# Patient Record
Sex: FEMALE | Race: WHITE | Employment: OTHER | URBAN - METROPOLITAN AREA
[De-identification: names, ages, dates, MRNs, and addresses within clinical notes are randomized per-mention and may not be internally consistent; named-entity substitution may affect disease eponyms.]

---

## 2017-01-02 ENCOUNTER — PATIENT MESSAGE (OUTPATIENT)
Dept: FAMILY MEDICINE CLINIC | Facility: CLINIC | Age: 47
End: 2017-01-02

## 2017-01-02 DIAGNOSIS — B37.9 CANDIDA INFECTION: Primary | ICD-10-CM

## 2017-01-02 RX ORDER — FLUCONAZOLE 150 MG/1
150 TABLET ORAL ONCE
Qty: 1 TABLET | Refills: 0 | Status: SHIPPED | OUTPATIENT
Start: 2017-01-02 | End: 2017-01-02

## 2017-01-02 NOTE — TELEPHONE ENCOUNTER
From: Bhavya Prince  To: Chyna Bird MD  Sent: 1/2/2017 7:40 AM CST  Subject: Non-Urgent Medical Question    Hi Lona Dark.  I need another Fluconazole because the sore on the right inside upper lip is not healed and it still hurts a bit even w

## 2017-01-03 ENCOUNTER — PATIENT MESSAGE (OUTPATIENT)
Dept: FAMILY MEDICINE CLINIC | Facility: CLINIC | Age: 47
End: 2017-01-03

## 2017-01-03 DIAGNOSIS — K25.3 CAMERON LESION, ACUTE: Primary | ICD-10-CM

## 2017-01-03 DIAGNOSIS — S73.192D ACETABULAR LABRUM TEAR, LEFT, SUBSEQUENT ENCOUNTER: ICD-10-CM

## 2017-01-04 NOTE — TELEPHONE ENCOUNTER
Please see above message and advise if okay for referral.  If so for what diagnosis? Please advise. Thank you!

## 2017-01-04 NOTE — TELEPHONE ENCOUNTER
Dx: Juan Fix lesion, acute/Acetabular labrum tear, left, subsequent encounter    [facility] for referral.

## 2017-01-05 ENCOUNTER — PATIENT MESSAGE (OUTPATIENT)
Dept: FAMILY MEDICINE CLINIC | Facility: CLINIC | Age: 47
End: 2017-01-05

## 2017-01-06 NOTE — TELEPHONE ENCOUNTER
Results were faxed to Dr. Loo [facility] office as requested at [phone]. Fax confirmation was received.

## 2017-01-06 NOTE — TELEPHONE ENCOUNTER
From: Ken Espinoza  To: Monica Jones MD  Sent: 1/5/2017 3:43 PM CST  Subject: Other    Hi Dr Fletcher Oh. I\"am going to see Dr Zhong Getting on January 20th and a copy of the ex-ray of my hip and a copy of the  MRI need to be sent to him . Thank You.

## 2017-01-09 ENCOUNTER — PATIENT MESSAGE (OUTPATIENT)
Dept: FAMILY MEDICINE CLINIC | Facility: CLINIC | Age: 47
End: 2017-01-09

## 2017-01-09 DIAGNOSIS — B37.9 CANDIDA INFECTION: Primary | ICD-10-CM

## 2017-01-10 RX ORDER — FLUCONAZOLE 150 MG/1
150 TABLET ORAL ONCE
Qty: 1 TABLET | Refills: 0 | Status: CANCELLED | OUTPATIENT
Start: 2017-01-10 | End: 2017-01-10

## 2017-01-10 NOTE — TELEPHONE ENCOUNTER
From: Helena Wells  To: Colleen Vincent MD  Sent: 1/9/2017 1:43 PM CST  Subject: Other    Hi Dr Evans Lies. I have another canker sore starting inside my lower lip on the right side. Can I get another refill of the Fluconazole ? Luis Vasquez Thank You.

## 2017-01-10 NOTE — TELEPHONE ENCOUNTER
Diflucan will not treat a canker sore. ..it only treats yeast which would appear as white in the mouth. Does she have any white discharge?  Otherwise she can use the lidocaine or valtrex given

## 2017-01-16 ENCOUNTER — OFFICE VISIT (OUTPATIENT)
Dept: FAMILY MEDICINE CLINIC | Facility: CLINIC | Age: 47
End: 2017-01-16

## 2017-01-16 ENCOUNTER — PATIENT MESSAGE (OUTPATIENT)
Dept: FAMILY MEDICINE CLINIC | Facility: CLINIC | Age: 47
End: 2017-01-16

## 2017-01-16 ENCOUNTER — TELEPHONE (OUTPATIENT)
Dept: FAMILY MEDICINE CLINIC | Facility: CLINIC | Age: 47
End: 2017-01-16

## 2017-01-16 VITALS — SYSTOLIC BLOOD PRESSURE: 120 MMHG | HEART RATE: 76 BPM | DIASTOLIC BLOOD PRESSURE: 80 MMHG | RESPIRATION RATE: 16 BRPM

## 2017-01-16 DIAGNOSIS — K12.0 ORAL APHTHOUS ULCER: Primary | ICD-10-CM

## 2017-01-16 PROCEDURE — 99213 OFFICE O/P EST LOW 20 MIN: CPT | Performed by: FAMILY MEDICINE

## 2017-01-16 RX ORDER — ACYCLOVIR 50 MG/G
OINTMENT TOPICAL
Qty: 30 G | Refills: 0 | Status: SHIPPED | OUTPATIENT
Start: 2017-01-16 | End: 2017-05-04 | Stop reason: ALTCHOICE

## 2017-01-16 RX ORDER — ACYCLOVIR 400 MG/1
400 TABLET ORAL 3 TIMES DAILY
Qty: 30 TABLET | Refills: 0 | Status: SHIPPED | OUTPATIENT
Start: 2017-01-16 | End: 2017-04-10

## 2017-01-16 RX ORDER — TRAMADOL HYDROCHLORIDE 50 MG/1
50 TABLET ORAL EVERY 8 HOURS PRN
Qty: 20 TABLET | Refills: 0 | Status: SHIPPED | OUTPATIENT
Start: 2017-01-16 | End: 2017-04-10

## 2017-01-16 NOTE — TELEPHONE ENCOUNTER
Called patient, went over medication below. Informed the pt, the ointment called in is meant for external use only. Pt states understanding.  Informed pt, an oral medication went to the pharmacy to take TID that should work systemically to clear up the cank

## 2017-01-16 NOTE — TELEPHONE ENCOUNTER
From: Katina Nash  To: Scarlett Peralta MD  Sent: 1/16/2017 3:43 PM CST  Subject: Other    Hi Dr Jagdeep Ellsworth. Can Acyclovir 5% Ointment, USP be used inside my mouth?

## 2017-01-17 ENCOUNTER — PATIENT MESSAGE (OUTPATIENT)
Dept: FAMILY MEDICINE CLINIC | Facility: CLINIC | Age: 47
End: 2017-01-17

## 2017-01-17 NOTE — TELEPHONE ENCOUNTER
From: Cherylann Lombard  To: Mckayla Moody MD  Sent: 1/17/2017 11:48 AM CST  Subject: Non-Urgent Medical Question    Hi Dr Perea Offer. I\"am throwing up from the medication and not feeling well can i please have a doctors note for 2 more days. Thank You.

## 2017-01-17 NOTE — TELEPHONE ENCOUNTER
From: Yasmin Michael  To: Karen Potts MD  Sent: 1/17/2017 11:38 AM CST  Subject: Non-Urgent Medical Question    Hi Dr Adiel Whitfield. Can I please have another doctors note for 2 more days as kehinde not feeling very well. thank you.

## 2017-01-20 ENCOUNTER — OFFICE VISIT (OUTPATIENT)
Dept: ORTHOPEDICS CLINIC | Facility: CLINIC | Age: 47
End: 2017-01-20

## 2017-01-20 DIAGNOSIS — M25.552 ACUTE PAIN OF LEFT HIP: Primary | ICD-10-CM

## 2017-01-20 DIAGNOSIS — M25.852 HIP IMPINGEMENT SYNDROME, LEFT: ICD-10-CM

## 2017-01-20 PROCEDURE — 99212 OFFICE O/P EST SF 10 MIN: CPT | Performed by: ORTHOPAEDIC SURGERY

## 2017-01-20 PROCEDURE — 99243 OFF/OP CNSLTJ NEW/EST LOW 30: CPT | Performed by: ORTHOPAEDIC SURGERY

## 2017-01-20 RX ORDER — VALACYCLOVIR HYDROCHLORIDE 1 G/1
TABLET, FILM COATED ORAL
COMMUNITY
Start: 2017-01-11 | End: 2017-03-27

## 2017-01-20 NOTE — PROGRESS NOTES
1/20/2017  Katina Nash  12/15/1970  55year old   female  Stephanie Richard MD    HPI:   Patient presents with:  Consult: Left hip pain -- Xrays and MRI done in 10/2016. Onset 10/2016 and denies injury. Tried T#3 and did not help.  Hx of scleroderm daily. Disp:  Rfl:    Lidocaine Viscous 2 % Mouth/Throat Solution Gargle with 15ml Q3hrs prn pain Disp: 200 mL Rfl: 0      HISTORY:  Past Medical History   Diagnosis Date   • Scleroderma ([address]           Past Surgical History    HYSTERECTOMY      Comment AG left    The risks, indications, benefits, and procedures of both operative and non-operative treatment were discussed with the patient. This is a pleasant 55-year-old female that sustained a left hip pain.   The patient also has impingement syndrome of t

## 2017-01-22 NOTE — PROGRESS NOTES
Chief Complaint:   Patient presents with:  Canker Sores    HPI:   This is a 55year old female patient is presenting for follow-up after she completed oral antibiotics and lidocaine as needed for oral ulcers.   Patient has a history of questionable cold sor Disp:  Rfl:    methotrexate 2.5 MG Oral Tab  Disp:  Rfl:    Losartan Potassium 25 MG Oral Tab Take 1 tablet by mouth daily. Disp:  Rfl:    folic acid 1 MG Oral Tab Take 1 tablet by mouth daily.  Disp:  Rfl:       Counseling given: Not Answered       REVIEW Constitutional: She is oriented to person, place, and time. She appears well-developed and well-nourished. HENT:   Head: Normocephalic and atraumatic. Right Ear: Tympanic membrane and ear canal normal. Tympanic membrane is not erythematous.  No cerume AND PLAN:   Diagnoses and all orders for this visit:    Oral aphthous ulcer  -     acyclovir 5 % External Ointment; Apply to ulcer tid x 7 days  -     TraMADol HCl 50 MG Oral Tab;  Take 1 tablet (50 mg total) by mouth every 8 (eight) hours as needed for [employer]

## 2017-01-30 ENCOUNTER — OFFICE VISIT (OUTPATIENT)
Dept: ORTHOPEDICS CLINIC | Facility: CLINIC | Age: 47
End: 2017-01-30

## 2017-01-30 DIAGNOSIS — M25.852 HIP IMPINGEMENT SYNDROME, LEFT: Primary | ICD-10-CM

## 2017-01-30 PROCEDURE — 99213 OFFICE O/P EST LOW 20 MIN: CPT | Performed by: ORTHOPAEDIC SURGERY

## 2017-01-30 PROCEDURE — 99212 OFFICE O/P EST SF 10 MIN: CPT | Performed by: ORTHOPAEDIC SURGERY

## 2017-01-30 NOTE — H&P
Chief Complaint: Left hip pain    NURSING INTAKE COMMENTS: Patient presents with:  Consult: Left hip pain -- Referred by Beto. MRI and xrays taken. History of present illness:   This 55year old female comes in for evaluation of left hip pain that 5/5      Quad 5/5 5/5      Abdominals 5/5 5/5        Pain         Flexion, adduction, IR ++ none       BALTA + none       Log Roll none none       Palpation of troch bursa none none       Palpation of hip flexors none none        Neurovascular status intac

## 2017-01-31 ENCOUNTER — TELEPHONE (OUTPATIENT)
Dept: ORTHOPEDICS CLINIC | Facility: CLINIC | Age: 47
End: 2017-01-31

## 2017-01-31 DIAGNOSIS — M25.852 IMPINGEMENT SYNDROME, HIP, LEFT: Primary | ICD-10-CM

## 2017-01-31 DIAGNOSIS — R52 PAIN: ICD-10-CM

## 2017-02-06 ENCOUNTER — TELEPHONE (OUTPATIENT)
Dept: ORTHOPEDICS CLINIC | Facility: CLINIC | Age: 47
End: 2017-02-06

## 2017-02-06 NOTE — TELEPHONE ENCOUNTER
Spoke to Jeff in [facility]. States no PA is needed for Steroid injection of left hip under ultrasound or fluoroscopy.

## 2017-02-06 NOTE — TELEPHONE ENCOUNTER
Patient requesting a call back. Would like to know if prior authorization for XR was approved. Please call.  Thank you

## 2017-02-21 ENCOUNTER — PATIENT MESSAGE (OUTPATIENT)
Dept: FAMILY MEDICINE CLINIC | Facility: CLINIC | Age: 47
End: 2017-02-21

## 2017-02-21 NOTE — TELEPHONE ENCOUNTER
From: Chi Montanez  To: Lynette Vázquez MD  Sent: 2/21/2017 9:15 AM CST  Subject: Other    Hi Dr Lis Burnett . I have come down flu like symptoms . They are a very bad cough,sneezing,achy,sore throat,no voice and a slight fever . Have been sick Monday & Tuesday

## 2017-02-21 NOTE — TELEPHONE ENCOUNTER
Pt's mother called for patient due to having no voice on status of work note request. Please respond via my chart to pt to inform.

## 2017-02-22 NOTE — TELEPHONE ENCOUNTER
Please read pt e-mail. Mom calling on status of note also. LOV 1/16/17. OK for note?  or would you like pt to be seen in office?

## 2017-02-22 NOTE — TELEPHONE ENCOUNTER
[id number] Thais Carreon for doctor's note.  I think it might be flu, doesn't need antibiotic, to see me if no improvement with in 48 hours

## 2017-02-25 ENCOUNTER — OFFICE VISIT (OUTPATIENT)
Dept: FAMILY MEDICINE CLINIC | Facility: CLINIC | Age: 47
End: 2017-02-25

## 2017-02-25 VITALS
BODY MASS INDEX: 18 KG/M2 | SYSTOLIC BLOOD PRESSURE: 102 MMHG | RESPIRATION RATE: 16 BRPM | TEMPERATURE: 98 F | OXYGEN SATURATION: 97 % | WEIGHT: 128 LBS | DIASTOLIC BLOOD PRESSURE: 70 MMHG | HEART RATE: 80 BPM

## 2017-02-25 DIAGNOSIS — J01.00 ACUTE MAXILLARY SINUSITIS, RECURRENCE NOT SPECIFIED: Primary | ICD-10-CM

## 2017-02-25 PROCEDURE — 99213 OFFICE O/P EST LOW 20 MIN: CPT | Performed by: FAMILY MEDICINE

## 2017-02-25 RX ORDER — CODEINE PHOSPHATE AND GUAIFENESIN 10; 100 MG/5ML; MG/5ML
5 SOLUTION ORAL 3 TIMES DAILY PRN
Qty: 120 ML | Refills: 0 | Status: SHIPPED | OUTPATIENT
Start: 2017-02-25 | End: 2017-05-04 | Stop reason: ALTCHOICE

## 2017-02-25 RX ORDER — AMOXICILLIN AND CLAVULANATE POTASSIUM 875; 125 MG/1; MG/1
1 TABLET, FILM COATED ORAL 2 TIMES DAILY
Qty: 20 TABLET | Refills: 0 | Status: SHIPPED | OUTPATIENT
Start: 2017-02-25 | End: 2017-03-07

## 2017-02-25 NOTE — PROGRESS NOTES
CHIEF COMPLAINT:   Patient presents with:  Cough: Body aches, chills. HPI:   Bhavya Prince is a 55year old female who presents for upper respiratory symptoms for  6 days.  Patient reports sore throat, congestion, low grade fever, cough with yello 01/18/2013  GENERAL: well developed, well nourished,in no apparent distress  SKIN: no rashes,no suspicious lesions  HEAD: atraumatic, normocephalic.  + tenderness on palpation of maxillary or frontal sinuses  EYES: conjunctiva clear, EOM intact  EARS: TM's

## 2017-02-27 ENCOUNTER — PATIENT MESSAGE (OUTPATIENT)
Dept: FAMILY MEDICINE CLINIC | Facility: CLINIC | Age: 47
End: 2017-02-27

## 2017-02-27 DIAGNOSIS — M34.9 SCLERODERMA (HCC): Primary | ICD-10-CM

## 2017-02-27 DIAGNOSIS — I73.00 RAYNAUD'S DISEASE WITHOUT GANGRENE: ICD-10-CM

## 2017-02-27 NOTE — TELEPHONE ENCOUNTER
From: Zoila Hirsch  To: Yobani Guillaume MD  Sent: 2/27/2017 8:38 AM CST  Subject: Other    Hi Dr Ruma Fallon sending you this message to let you know that i had to get a new Rheumatologist because [facility] is no longer accepting [facility] . D

## 2017-03-10 ENCOUNTER — TELEPHONE (OUTPATIENT)
Dept: ORTHOPEDICS CLINIC | Facility: CLINIC | Age: 47
End: 2017-03-10

## 2017-03-10 ENCOUNTER — PATIENT MESSAGE (OUTPATIENT)
Dept: FAMILY MEDICINE CLINIC | Facility: CLINIC | Age: 47
End: 2017-03-10

## 2017-03-10 NOTE — TELEPHONE ENCOUNTER
pt called. Does she need pre surgical testing? She states she is scheduled for future surgery with [facility]. Please advise.

## 2017-03-10 NOTE — TELEPHONE ENCOUNTER
Call back to Simona Rowe . Informed her that we in office dept do not do authzations for the radiology procedures like hip injection.  Informed that we do not have to authoirzed for the steroid medication and if the actural procedure needs authorization the radio

## 2017-03-10 NOTE — TELEPHONE ENCOUNTER
Call to [facility]  REviewed  For hip injection. Pt according to pre procedure instructeions to have a physical . Last saw her physician Sept 2016. Within a year. Pt is on  Oral prednisone .   Instructed to call her primary and ask if she soul  Be seen prior t

## 2017-03-14 NOTE — TELEPHONE ENCOUNTER
I don't see anything that would be contraindicated for steroid injection at this time, have her check with whoever's given injection

## 2017-03-27 ENCOUNTER — OFFICE VISIT (OUTPATIENT)
Dept: RHEUMATOLOGY | Facility: CLINIC | Age: 47
End: 2017-03-27

## 2017-03-27 ENCOUNTER — APPOINTMENT (OUTPATIENT)
Dept: LAB | Facility: HOSPITAL | Age: 47
End: 2017-03-27
Attending: INTERNAL MEDICINE
Payer: MEDICAID

## 2017-03-27 VITALS
HEART RATE: 79 BPM | SYSTOLIC BLOOD PRESSURE: 125 MMHG | TEMPERATURE: 99 F | HEIGHT: 70 IN | BODY MASS INDEX: 18.9 KG/M2 | WEIGHT: 132 LBS | DIASTOLIC BLOOD PRESSURE: 81 MMHG

## 2017-03-27 DIAGNOSIS — Z51.81 THERAPEUTIC DRUG MONITORING: ICD-10-CM

## 2017-03-27 DIAGNOSIS — R53.83 FATIGUE, UNSPECIFIED TYPE: ICD-10-CM

## 2017-03-27 DIAGNOSIS — M34.9 SCLERODERMA (HCC): ICD-10-CM

## 2017-03-27 DIAGNOSIS — B34.9 VIRAL SYNDROME: ICD-10-CM

## 2017-03-27 DIAGNOSIS — M34.9 SCLERODERMA (HCC): Primary | ICD-10-CM

## 2017-03-27 LAB
ALBUMIN SERPL BCP-MCNC: 4 G/DL (ref 3.5–4.8)
ALBUMIN/GLOB SERPL: 1.5 {RATIO} (ref 1–2)
ALP SERPL-CCNC: 95 U/L (ref 32–100)
ALT SERPL-CCNC: 36 U/L (ref 14–54)
ANION GAP SERPL CALC-SCNC: 7 MMOL/L (ref 0–18)
AST SERPL-CCNC: 30 U/L (ref 15–41)
BACTERIA UR QL AUTO: NEGATIVE /HPF
BILIRUB SERPL-MCNC: 1.6 MG/DL (ref 0.3–1.2)
BILIRUB UR QL: NEGATIVE
BUN SERPL-MCNC: 7 MG/DL (ref 8–20)
BUN/CREAT SERPL: 11.1 (ref 10–20)
CALCIUM SERPL-MCNC: 9.4 MG/DL (ref 8.5–10.5)
CHLORIDE SERPL-SCNC: 106 MMOL/L (ref 95–110)
CLARITY UR: CLEAR
CO2 SERPL-SCNC: 28 MMOL/L (ref 22–32)
COLOR UR: YELLOW
CREAT SERPL-MCNC: 0.63 MG/DL (ref 0.5–1.5)
CRP SERPL-MCNC: <0.5 MG/DL (ref 0–0.9)
ERYTHROCYTE [DISTWIDTH] IN BLOOD BY AUTOMATED COUNT: 14.5 % (ref 11–15)
ERYTHROCYTE [SEDIMENTATION RATE] IN BLOOD: 6 MM/HR (ref 0–20)
GLOBULIN PLAS-MCNC: 2.6 G/DL (ref 2.5–3.7)
GLUCOSE SERPL-MCNC: 89 MG/DL (ref 70–99)
GLUCOSE UR-MCNC: NEGATIVE MG/DL
HCT VFR BLD AUTO: 37.8 % (ref 35–48)
HGB BLD-MCNC: 12.5 G/DL (ref 12–16)
HGB UR QL STRIP.AUTO: NEGATIVE
KETONES UR-MCNC: NEGATIVE MG/DL
LEUKOCYTE ESTERASE UR QL STRIP.AUTO: NEGATIVE
MCH RBC QN AUTO: 31.6 PG (ref 27–32)
MCHC RBC AUTO-ENTMCNC: 33.1 G/DL (ref 32–37)
MCV RBC AUTO: 95.4 FL (ref 80–100)
NITRITE UR QL STRIP.AUTO: NEGATIVE
OSMOLALITY UR CALC.SUM OF ELEC: 289 MOSM/KG (ref 275–295)
PH UR: 8 [PH] (ref 5–8)
PLATELET # BLD AUTO: 189 K/UL (ref 140–400)
PMV BLD AUTO: 8.2 FL (ref 7.4–10.3)
POTASSIUM SERPL-SCNC: 3.9 MMOL/L (ref 3.3–5.1)
PROT SERPL-MCNC: 6.6 G/DL (ref 5.9–8.4)
PROT UR-MCNC: NEGATIVE MG/DL
RBC # BLD AUTO: 3.96 M/UL (ref 3.7–5.4)
RBC #/AREA URNS AUTO: 1 /HPF
SODIUM SERPL-SCNC: 141 MMOL/L (ref 136–144)
SP GR UR STRIP: 1.01 (ref 1–1.03)
TSH SERPL-ACNC: 1.75 UIU/ML (ref 0.34–5.6)
UROBILINOGEN UR STRIP-ACNC: <2
VIT C UR-MCNC: NEGATIVE MG/DL
WBC # BLD AUTO: 4 K/UL (ref 4–11)
WBC #/AREA URNS AUTO: <1 /HPF

## 2017-03-27 PROCEDURE — 85027 COMPLETE CBC AUTOMATED: CPT

## 2017-03-27 PROCEDURE — 84443 ASSAY THYROID STIM HORMONE: CPT

## 2017-03-27 PROCEDURE — 85652 RBC SED RATE AUTOMATED: CPT

## 2017-03-27 PROCEDURE — 99212 OFFICE O/P EST SF 10 MIN: CPT | Performed by: INTERNAL MEDICINE

## 2017-03-27 PROCEDURE — 36415 COLL VENOUS BLD VENIPUNCTURE: CPT

## 2017-03-27 PROCEDURE — 80053 COMPREHEN METABOLIC PANEL: CPT

## 2017-03-27 PROCEDURE — 86140 C-REACTIVE PROTEIN: CPT

## 2017-03-27 PROCEDURE — 99244 OFF/OP CNSLTJ NEW/EST MOD 40: CPT | Performed by: INTERNAL MEDICINE

## 2017-03-27 PROCEDURE — 81003 URINALYSIS AUTO W/O SCOPE: CPT

## 2017-03-27 RX ORDER — OMEPRAZOLE 20 MG/1
20 CAPSULE, DELAYED RELEASE ORAL DAILY
COMMUNITY

## 2017-03-27 RX ORDER — LOSARTAN POTASSIUM 25 MG/1
25 TABLET ORAL DAILY
Qty: 30 TABLET | Refills: 1 | Status: SHIPPED | OUTPATIENT
Start: 2017-03-27 | End: 2017-05-25

## 2017-03-27 RX ORDER — PREDNISONE 2.5 MG
5 TABLET ORAL DAILY
Qty: 60 TABLET | Refills: 1 | Status: SHIPPED | OUTPATIENT
Start: 2017-03-27 | End: 2017-05-25

## 2017-03-27 RX ORDER — FOLIC ACID 1 MG/1
1 TABLET ORAL DAILY
Qty: 30 TABLET | Refills: 6 | Status: SHIPPED | OUTPATIENT
Start: 2017-03-27 | End: 2017-07-21

## 2017-03-27 NOTE — PROGRESS NOTES
Elysia Sims is a 55year old female who presents for Patient presents with:  CREST Scleroderma: past dx., establish RHE, leg swelling  Fatigue  . HPI:     I had the pleasure of seeing Elysia Sims on 3/27/2017 for evaluation.      She had a t has passed out before - was taken to hospital about 2 years ago - high liver enzymes. She had labs in [facility]. She had a 2decho at [facility] - that was good. She didn't have the PFT done recently.      She gets joint pains in her f History:    Smoking Status: Never Smoker                      Smokeless Status: Never Used                        Alcohol Use: No               Working at [employer] - 6 hours for 5 days a week   Single, no kdins.         REVIEW OF SYSTEMS:   Review Of Syst 9/14/2016   WBC      4.0-13.0 x10(3) uL  6.1   RBC      3.80-5.10 x10(6)uL  3.91   Hemoglobin      12.0-16.0 g/dL  12.6   Hematocrit      34.0-50.0 %  40.3   Platelet Count      232.1-164.9 10(3)uL  243.0   MCV      81.0-100.0 fL  103.1 (H)   MCH      27. 0 mmol/L 25.0     Component      Latest Ref Rng 3/31/2016   T4,Free (Direct)      0.9-1.8 ng/dL 1.3   TSH      0.350-5.500 mIU/mL 0.845     8/6/2014 - ct chest hi res  CONCLUSION:  Negative exam.      10/24/2016 - mri left hip   1.  There is a cam lesion of t

## 2017-03-27 NOTE — PATIENT INSTRUCTIONS
1. Check labs  2. Pulmonary function tests   3. Cont. Methotrexate 8 tablets a week and folic acid 1mg a day   4. Cont. losasrtan 25mg a day   5. Cont. Prednisone 5mg ad ay   6. Return to [facility] in 6 weeks.

## 2017-03-28 ENCOUNTER — TELEPHONE (OUTPATIENT)
Dept: FAMILY MEDICINE CLINIC | Facility: CLINIC | Age: 47
End: 2017-03-28

## 2017-03-29 NOTE — TELEPHONE ENCOUNTER
Patient was called from [facility] to follow-up on hypertension. Patient does not have a diagnosis of hypertension. Called patient and left message per HIPAA advising to disregard automated message. Advised patient to contact the office with any questions.

## 2017-03-30 ENCOUNTER — TELEPHONE (OUTPATIENT)
Dept: ORTHOPEDICS CLINIC | Facility: CLINIC | Age: 47
End: 2017-03-30

## 2017-03-30 DIAGNOSIS — M25.852 IMPINGEMENT SYNDROME, HIP, LEFT: Primary | ICD-10-CM

## 2017-03-30 NOTE — TELEPHONE ENCOUNTER
Patient states that she called [facility] just to double check to make sure procedure (Steroid injection of left hip under ultrasound or fluoroscopy. ) would be covered, states [facility] a prior [facility] for procedure.  Has appt scheduled o

## 2017-04-03 ENCOUNTER — TELEPHONE (OUTPATIENT)
Dept: ORTHOPEDICS CLINIC | Facility: CLINIC | Age: 47
End: 2017-04-03

## 2017-04-03 ENCOUNTER — HOSPITAL ENCOUNTER (OUTPATIENT)
Dept: GENERAL RADIOLOGY | Facility: HOSPITAL | Age: 47
Discharge: HOME | End: 2017-04-03
Attending: ORTHOPAEDIC SURGERY
Payer: MEDICAID

## 2017-04-03 DIAGNOSIS — M25.852 HIP IMPINGEMENT SYNDROME, LEFT: ICD-10-CM

## 2017-04-03 DIAGNOSIS — M25.852 HIP IMPINGEMENT SYNDROME, LEFT: Primary | ICD-10-CM

## 2017-04-03 PROCEDURE — 77002 NEEDLE LOCALIZATION BY XRAY: CPT

## 2017-04-03 PROCEDURE — 20610 DRAIN/INJ JOINT/BURSA W/O US: CPT

## 2017-04-03 RX ORDER — LIDOCAINE HYDROCHLORIDE 10 MG/ML
4 INJECTION, SOLUTION EPIDURAL; INFILTRATION; INTRACAUDAL; PERINEURAL ONCE
Status: COMPLETED | OUTPATIENT
Start: 2017-04-03 | End: 2017-04-03

## 2017-04-03 RX ORDER — METHYLPREDNISOLONE ACETATE 40 MG/ML
40 INJECTION, SUSPENSION INTRA-ARTICULAR; INTRALESIONAL; INTRAMUSCULAR; SOFT TISSUE ONCE
Status: COMPLETED | OUTPATIENT
Start: 2017-04-03 | End: 2017-04-03

## 2017-04-03 RX ORDER — METHYLPREDNISOLONE ACETATE 40 MG/ML
INJECTION, SUSPENSION INTRA-ARTICULAR; INTRALESIONAL; INTRAMUSCULAR; SOFT TISSUE
Status: DISPENSED
Start: 2017-04-03 | End: 2017-04-03

## 2017-04-03 RX ORDER — LIDOCAINE HYDROCHLORIDE 10 MG/ML
INJECTION, SOLUTION EPIDURAL; INFILTRATION; INTRACAUDAL; PERINEURAL
Status: DISPENSED
Start: 2017-04-03 | End: 2017-04-03

## 2017-04-03 RX ADMIN — METHYLPREDNISOLONE ACETATE 40 MG: 40 INJECTION, SUSPENSION INTRA-ARTICULAR; INTRALESIONAL; INTRAMUSCULAR; SOFT TISSUE at 09:00:00

## 2017-04-03 RX ADMIN — LIDOCAINE HYDROCHLORIDE 4 ML: 10 INJECTION, SOLUTION EPIDURAL; INFILTRATION; INTRACAUDAL; PERINEURAL at 09:50:00

## 2017-04-03 RX ADMIN — METHYLPREDNISOLONE ACETATE 40 MG: 40 INJECTION, SUSPENSION INTRA-ARTICULAR; INTRALESIONAL; INTRAMUSCULAR; SOFT TISSUE at 09:50:00

## 2017-04-04 ENCOUNTER — PATIENT MESSAGE (OUTPATIENT)
Dept: RHEUMATOLOGY | Facility: CLINIC | Age: 47
End: 2017-04-04

## 2017-04-04 DIAGNOSIS — B34.9 VIRAL SYNDROME: Primary | ICD-10-CM

## 2017-04-04 NOTE — TELEPHONE ENCOUNTER
From: Allegra Jama  To: Marc Cazares MD  Sent: 4/4/2017 4:51 AM CDT  Subject: Other    Hi DR BASHIR DUVALL need a refill of Methotrexate 2.5 mg thank you.

## 2017-04-10 ENCOUNTER — PATIENT MESSAGE (OUTPATIENT)
Dept: FAMILY MEDICINE CLINIC | Facility: CLINIC | Age: 47
End: 2017-04-10

## 2017-04-10 DIAGNOSIS — K12.0 ORAL APHTHOUS ULCER: Primary | ICD-10-CM

## 2017-04-10 RX ORDER — ACYCLOVIR 400 MG/1
400 TABLET ORAL 3 TIMES DAILY
Qty: 30 TABLET | Refills: 0 | Status: SHIPPED | OUTPATIENT
Start: 2017-04-10 | End: 2017-05-04

## 2017-04-10 RX ORDER — TRAMADOL HYDROCHLORIDE 50 MG/1
50 TABLET ORAL EVERY 8 HOURS PRN
Qty: 20 TABLET | Refills: 0 | Status: SHIPPED
Start: 2017-04-10 | End: 2017-07-21 | Stop reason: ALTCHOICE

## 2017-04-10 NOTE — TELEPHONE ENCOUNTER
From: Ozzie Cash  To: Yen Young MD  Sent: 4/10/2017 10:09 AM CDT  Subject: Other    Hi Dr Dorothy Stallworth. I have those sores in my mouth again. They are very painful & it is hard to eat and sometimes talk. I need a refill of the Tramadol & Acyclovir . I am go

## 2017-04-10 NOTE — TELEPHONE ENCOUNTER
From: Cherylann Lombard  To: Mckayla Moody MD  Sent: 4/10/2017 10:16 AM CDT  Subject: Other    Thank You Dr Zaira jennings.

## 2017-05-03 ENCOUNTER — HOSPITAL ENCOUNTER (EMERGENCY)
Age: 47
Discharge: HOME | End: 2017-05-03
Attending: EMERGENCY MEDICINE
Payer: MEDICAID

## 2017-05-03 ENCOUNTER — APPOINTMENT (OUTPATIENT)
Dept: CT IMAGING | Age: 47
End: 2017-05-03
Attending: EMERGENCY MEDICINE
Payer: MEDICAID

## 2017-05-03 ENCOUNTER — APPOINTMENT (OUTPATIENT)
Dept: GENERAL RADIOLOGY | Age: 47
End: 2017-05-03
Attending: EMERGENCY MEDICINE
Payer: MEDICAID

## 2017-05-03 VITALS
SYSTOLIC BLOOD PRESSURE: 114 MMHG | WEIGHT: 128 LBS | HEART RATE: 78 BPM | TEMPERATURE: 99 F | HEIGHT: 70 IN | DIASTOLIC BLOOD PRESSURE: 66 MMHG | BODY MASS INDEX: 18.32 KG/M2 | RESPIRATION RATE: 22 BRPM | OXYGEN SATURATION: 99 %

## 2017-05-03 DIAGNOSIS — R07.89 CHEST PAIN, ATYPICAL: ICD-10-CM

## 2017-05-03 DIAGNOSIS — R09.1 PLEURISY: Primary | ICD-10-CM

## 2017-05-03 PROCEDURE — 85025 COMPLETE CBC W/AUTO DIFF WBC: CPT | Performed by: EMERGENCY MEDICINE

## 2017-05-03 PROCEDURE — 71010 XR CHEST AP PORTABLE  (CPT=71010): CPT

## 2017-05-03 PROCEDURE — 93010 ELECTROCARDIOGRAM REPORT: CPT

## 2017-05-03 PROCEDURE — 99285 EMERGENCY DEPT VISIT HI MDM: CPT

## 2017-05-03 PROCEDURE — 80053 COMPREHEN METABOLIC PANEL: CPT | Performed by: EMERGENCY MEDICINE

## 2017-05-03 PROCEDURE — 85610 PROTHROMBIN TIME: CPT | Performed by: EMERGENCY MEDICINE

## 2017-05-03 PROCEDURE — 96375 TX/PRO/DX INJ NEW DRUG ADDON: CPT

## 2017-05-03 PROCEDURE — 96361 HYDRATE IV INFUSION ADD-ON: CPT

## 2017-05-03 PROCEDURE — 93005 ELECTROCARDIOGRAM TRACING: CPT

## 2017-05-03 PROCEDURE — 84484 ASSAY OF TROPONIN QUANT: CPT | Performed by: EMERGENCY MEDICINE

## 2017-05-03 PROCEDURE — 71275 CT ANGIOGRAPHY CHEST: CPT

## 2017-05-03 PROCEDURE — 85730 THROMBOPLASTIN TIME PARTIAL: CPT | Performed by: EMERGENCY MEDICINE

## 2017-05-03 PROCEDURE — 96374 THER/PROPH/DIAG INJ IV PUSH: CPT

## 2017-05-03 RX ORDER — KETOROLAC TROMETHAMINE 30 MG/ML
15 INJECTION, SOLUTION INTRAMUSCULAR; INTRAVENOUS ONCE
Status: COMPLETED | OUTPATIENT
Start: 2017-05-03 | End: 2017-05-03

## 2017-05-03 RX ORDER — SODIUM CHLORIDE 9 MG/ML
INJECTION, SOLUTION INTRAVENOUS CONTINUOUS
Status: DISCONTINUED | OUTPATIENT
Start: 2017-05-03 | End: 2017-05-03

## 2017-05-03 RX ORDER — HYDROMORPHONE HYDROCHLORIDE 1 MG/ML
0.5 INJECTION, SOLUTION INTRAMUSCULAR; INTRAVENOUS; SUBCUTANEOUS ONCE
Status: COMPLETED | OUTPATIENT
Start: 2017-05-03 | End: 2017-05-03

## 2017-05-03 NOTE — ED INITIAL ASSESSMENT (HPI)
Pt states she came home from work on Monday and started having r chest pain that radiated to her r back and left chest. Pt has a hx of scleraderma.

## 2017-05-03 NOTE — ED PROVIDER NOTES
Patient Seen in: THE [facility] In [place]    History   Patient presents with:  Dyspnea ROMEO SOB (respiratory)    Stated Complaint: romeo    HPI    51-year-old female with history of scleroderma resents emergency room with chief complaint of Age of Onset   • Cancer Father      lung   • Diamond Jason Mother    • osteoarthritis[other] [OTHER] Mother    • Cancer Daughter      lung   • Other[other] [OTHER] Son    • crr of liver[other] [OTHER] Son    • RA[other] Ron [place]ogue Maternal Grandmother tenderness  EXTREMITIES: No peripheral edema, no calf tenderness, dorsal pedal pulses present and equal bilaterally. SKIN: Warm, dry, intact, no rashes.     ED Course     Labs Reviewed   COMP METABOLIC PANEL (14) - Abnormal; Notable for the following: worsening symptoms, discussed supportive care and follow-up. Patient well-appearing in discharge good condition.         Disposition and Plan     Clinical Impression:  Pleurisy  (primary encounter diagnosis)  Chest pain, atypical    Disposition:  Discharge

## 2017-05-04 ENCOUNTER — OFFICE VISIT (OUTPATIENT)
Dept: FAMILY MEDICINE CLINIC | Facility: CLINIC | Age: 47
End: 2017-05-04

## 2017-05-04 VITALS
RESPIRATION RATE: 14 BRPM | SYSTOLIC BLOOD PRESSURE: 110 MMHG | HEART RATE: 84 BPM | OXYGEN SATURATION: 97 % | DIASTOLIC BLOOD PRESSURE: 78 MMHG | BODY MASS INDEX: 19 KG/M2 | TEMPERATURE: 99 F | WEIGHT: 129 LBS

## 2017-05-04 DIAGNOSIS — M34.9 SCLERODERMA (HCC): ICD-10-CM

## 2017-05-04 DIAGNOSIS — J98.11 ATELECTASIS: ICD-10-CM

## 2017-05-04 DIAGNOSIS — K12.1 ORAL ULCER: ICD-10-CM

## 2017-05-04 DIAGNOSIS — R09.1 PLEURISY: Primary | ICD-10-CM

## 2017-05-04 PROCEDURE — 99214 OFFICE O/P EST MOD 30 MIN: CPT | Performed by: PHYSICIAN ASSISTANT

## 2017-05-04 RX ORDER — IBUPROFEN 600 MG/1
600 TABLET ORAL EVERY 6 HOURS PRN
COMMUNITY

## 2017-05-04 RX ORDER — ACYCLOVIR 400 MG/1
400 TABLET ORAL 3 TIMES DAILY
Qty: 30 TABLET | Refills: 0 | Status: SHIPPED | OUTPATIENT
Start: 2017-05-04 | End: 2017-05-14

## 2017-05-04 NOTE — PROGRESS NOTES
Patient presents with:  Hospital F/U: Pt is following up from the hospital for chest, back and arm pain       HPI:     This 55year old female presents to follow up from ER visit 5/3/17 for chest pain.  Chest pain started Monday 5/1/17-Pain started off on t symmetric  Skin: Skin color, texture, turgor normal. No rashes or lesions  Neurologic: Grossly normal    Assessment/Plan:       Pleurisy/atelectasis        - Continue ibuprofen 600 mg ever 4-6 hours. Take with food.  No more then 3200 mg per day        - Saturnino Pelayo

## 2017-05-08 ENCOUNTER — OFFICE VISIT (OUTPATIENT)
Dept: ORTHOPEDICS CLINIC | Facility: CLINIC | Age: 47
End: 2017-05-08

## 2017-05-08 DIAGNOSIS — M25.852 IMPINGEMENT SYNDROME, HIP, LEFT: Primary | ICD-10-CM

## 2017-05-08 PROCEDURE — 99213 OFFICE O/P EST LOW 20 MIN: CPT | Performed by: ORTHOPAEDIC SURGERY

## 2017-05-08 PROCEDURE — 99212 OFFICE O/P EST SF 10 MIN: CPT | Performed by: ORTHOPAEDIC SURGERY

## 2017-05-09 ENCOUNTER — HOSPITAL ENCOUNTER (OUTPATIENT)
Dept: GENERAL RADIOLOGY | Age: 47
Discharge: HOME | End: 2017-05-09
Attending: PHYSICIAN ASSISTANT
Payer: MEDICAID

## 2017-05-09 ENCOUNTER — OFFICE VISIT (OUTPATIENT)
Dept: FAMILY MEDICINE CLINIC | Facility: CLINIC | Age: 47
End: 2017-05-09

## 2017-05-09 VITALS
HEIGHT: 69 IN | BODY MASS INDEX: 19.55 KG/M2 | WEIGHT: 132 LBS | DIASTOLIC BLOOD PRESSURE: 80 MMHG | SYSTOLIC BLOOD PRESSURE: 108 MMHG | TEMPERATURE: 98 F | HEART RATE: 92 BPM | RESPIRATION RATE: 16 BRPM

## 2017-05-09 DIAGNOSIS — R09.1 PLEURISY: Primary | ICD-10-CM

## 2017-05-09 DIAGNOSIS — M34.9 SCLERODERMA (HCC): ICD-10-CM

## 2017-05-09 DIAGNOSIS — R09.1 PLEURISY: ICD-10-CM

## 2017-05-09 DIAGNOSIS — R50.9 FEVER, UNSPECIFIED FEVER CAUSE: ICD-10-CM

## 2017-05-09 DIAGNOSIS — R19.7 DIARRHEA, UNSPECIFIED TYPE: ICD-10-CM

## 2017-05-09 PROCEDURE — 99214 OFFICE O/P EST MOD 30 MIN: CPT | Performed by: PHYSICIAN ASSISTANT

## 2017-05-09 PROCEDURE — 71020 XR CHEST PA + LAT CHEST (CPT=71020): CPT | Performed by: PHYSICIAN ASSISTANT

## 2017-05-09 NOTE — PROGRESS NOTES
Patient presents with:  Chest Pain: Pt is following up on chest pain. Pt c/o fatigue and diarrhea      HPI:     This 55year old female presents to follow up on pleurisy. She is taking ibuprofen and does help but she still has pain.  Pain is on the left camille acute disease on xray   - Overall improving.   - Continue ibuprofen prn pain  - Discussed option of increasing oral prednisone but per patient pain is not severe     Diarrhea, unspecified type         - Probiotic OTC          - If episodes increase/persist

## 2017-05-25 ENCOUNTER — PATIENT MESSAGE (OUTPATIENT)
Dept: RHEUMATOLOGY | Facility: CLINIC | Age: 47
End: 2017-05-25

## 2017-05-25 DIAGNOSIS — B34.9 VIRAL SYNDROME: Primary | ICD-10-CM

## 2017-05-25 RX ORDER — LOSARTAN POTASSIUM 25 MG/1
25 TABLET ORAL DAILY
Qty: 30 TABLET | Refills: 1 | Status: SHIPPED | OUTPATIENT
Start: 2017-05-25 | End: 2017-07-21

## 2017-05-25 RX ORDER — PREDNISONE 2.5 MG
5 TABLET ORAL DAILY
Qty: 60 TABLET | Refills: 1 | Status: SHIPPED | OUTPATIENT
Start: 2017-05-25 | End: 2017-07-21

## 2017-05-25 NOTE — TELEPHONE ENCOUNTER
From: Steve Limon  To: Mariela Boyle MD  Sent: 5/25/2017 10:43 AM CDT  Subject: Other    Hi Dr Carmen Heart. I need a refill of PREDNISONE 2.5MG tablets and also LOSARTAN 25 MG tablets . Thank You.

## 2017-05-25 NOTE — TELEPHONE ENCOUNTER
LOV: 3/27/17  No future appointments.   Labs:   Component      Latest Ref Rng 5/3/2017   Glucose      70-99 mg/dL 133 (H)   BUN      8-20 mg/dL 12   CREATININE      0.55-1.02 mg/dL 0.73   GFR      >=60 99   CALCIUM      8.3-10.3 mg/dL 9.6   ALKALINE PHOSPHA

## 2017-07-05 ENCOUNTER — PATIENT MESSAGE (OUTPATIENT)
Dept: FAMILY MEDICINE CLINIC | Facility: CLINIC | Age: 47
End: 2017-07-05

## 2017-07-06 NOTE — TELEPHONE ENCOUNTER
From: Michael Titus  To: Melinda Hernandez MD  Sent: 7/5/2017 3:02 PM CDT  Subject: Non-Urgent Medical Question    Hi Dr Guillermina Fox . Did Dr Gemma Burleson send an order for blood work to be done before i come and see her ?  I was told by her office that [facility]

## 2017-07-10 ENCOUNTER — HOSPITAL ENCOUNTER (OUTPATIENT)
Dept: GENERAL RADIOLOGY | Age: 47
Discharge: HOME | End: 2017-07-10
Attending: FAMILY MEDICINE
Payer: COMMERCIAL

## 2017-07-10 ENCOUNTER — TELEPHONE (OUTPATIENT)
Dept: FAMILY MEDICINE CLINIC | Facility: CLINIC | Age: 47
End: 2017-07-10

## 2017-07-10 ENCOUNTER — OFFICE VISIT (OUTPATIENT)
Dept: FAMILY MEDICINE CLINIC | Facility: CLINIC | Age: 47
End: 2017-07-10

## 2017-07-10 ENCOUNTER — LAB ENCOUNTER (OUTPATIENT)
Dept: LAB | Age: 47
End: 2017-07-10
Attending: FAMILY MEDICINE
Payer: COMMERCIAL

## 2017-07-10 VITALS
RESPIRATION RATE: 16 BRPM | BODY MASS INDEX: 19.26 KG/M2 | WEIGHT: 130 LBS | TEMPERATURE: 98 F | HEART RATE: 74 BPM | DIASTOLIC BLOOD PRESSURE: 67 MMHG | OXYGEN SATURATION: 97 % | SYSTOLIC BLOOD PRESSURE: 113 MMHG | HEIGHT: 69 IN

## 2017-07-10 DIAGNOSIS — Z13.29 SCREENING FOR ENDOCRINE, NUTRITIONAL, METABOLIC AND IMMUNITY DISORDER: ICD-10-CM

## 2017-07-10 DIAGNOSIS — Z13.21 SCREENING FOR ENDOCRINE, NUTRITIONAL, METABOLIC AND IMMUNITY DISORDER: ICD-10-CM

## 2017-07-10 DIAGNOSIS — M25.472 LEFT ANKLE SWELLING: Primary | ICD-10-CM

## 2017-07-10 DIAGNOSIS — Z13.228 SCREENING FOR ENDOCRINE, NUTRITIONAL, METABOLIC AND IMMUNITY DISORDER: ICD-10-CM

## 2017-07-10 DIAGNOSIS — Z13.0 SCREENING FOR ENDOCRINE, NUTRITIONAL, METABOLIC AND IMMUNITY DISORDER: ICD-10-CM

## 2017-07-10 DIAGNOSIS — Z12.31 ENCOUNTER FOR SCREENING MAMMOGRAM FOR MALIGNANT NEOPLASM OF BREAST: ICD-10-CM

## 2017-07-10 DIAGNOSIS — M25.472 LEFT ANKLE SWELLING: ICD-10-CM

## 2017-07-10 DIAGNOSIS — M34.9 SCLERODERMA (HCC): ICD-10-CM

## 2017-07-10 DIAGNOSIS — E53.8 VITAMIN B 12 DEFICIENCY: ICD-10-CM

## 2017-07-10 LAB
25-HYDROXYVITAMIN D (TOTAL): 42.4 NG/ML (ref 30–100)
ALBUMIN SERPL-MCNC: 3.7 G/DL (ref 3.5–4.8)
ALP LIVER SERPL-CCNC: 110 U/L (ref 39–100)
ALT SERPL-CCNC: 15 U/L (ref 14–54)
AST SERPL-CCNC: 16 U/L (ref 15–41)
BASOPHILS # BLD AUTO: 0.05 X10(3) UL (ref 0–0.1)
BASOPHILS NFR BLD AUTO: 0.8 %
BILIRUB SERPL-MCNC: 0.7 MG/DL (ref 0.1–2)
BUN BLD-MCNC: 12 MG/DL (ref 8–20)
CALCIUM BLD-MCNC: 9.5 MG/DL (ref 8.3–10.3)
CHLORIDE: 107 MMOL/L (ref 101–111)
CO2: 28 MMOL/L (ref 22–32)
CREAT BLD-MCNC: 0.81 MG/DL (ref 0.55–1.02)
EOSINOPHIL # BLD AUTO: 0.06 X10(3) UL (ref 0–0.3)
EOSINOPHIL NFR BLD AUTO: 0.9 %
ERYTHROCYTE [DISTWIDTH] IN BLOOD BY AUTOMATED COUNT: 12.3 % (ref 11.5–16)
FOLATE (FOLIC ACID), SERUM: 11.3 NG/ML (ref 8.7–24)
GLUCOSE BLD-MCNC: 102 MG/DL (ref 70–99)
HAV AB SERPL IA-ACNC: 523 PG/ML (ref 193–986)
HCT VFR BLD AUTO: 38.3 % (ref 34–50)
HGB BLD-MCNC: 11.9 G/DL (ref 12–16)
IMMATURE GRANULOCYTE COUNT: 0.02 X10(3) UL (ref 0–1)
IMMATURE GRANULOCYTE RATIO %: 0.3 %
LYMPHOCYTES # BLD AUTO: 2.08 X10(3) UL (ref 0.9–4)
LYMPHOCYTES NFR BLD AUTO: 32.3 %
M PROTEIN MFR SERPL ELPH: 7.5 G/DL (ref 6.1–8.3)
MCH RBC QN AUTO: 32.3 PG (ref 27–33.2)
MCHC RBC AUTO-ENTMCNC: 31.1 G/DL (ref 31–37)
MCV RBC AUTO: 104.1 FL (ref 81–100)
MONOCYTES # BLD AUTO: 0.57 X10(3) UL (ref 0.1–0.6)
MONOCYTES NFR BLD AUTO: 8.9 %
NEUTROPHIL ABS PRELIM: 3.66 X10 (3) UL (ref 1.3–6.7)
NEUTROPHILS # BLD AUTO: 3.66 X10(3) UL (ref 1.3–6.7)
NEUTROPHILS NFR BLD AUTO: 56.8 %
PLATELET # BLD AUTO: 267 10(3)UL (ref 150–450)
POTASSIUM SERPL-SCNC: 3.7 MMOL/L (ref 3.6–5.1)
RBC # BLD AUTO: 3.68 X10(6)UL (ref 3.8–5.1)
RED CELL DISTRIBUTION WIDTH-SD: 47.4 FL (ref 35.1–46.3)
SED RATE-ML: 11 MM/HR (ref 0–25)
SODIUM SERPL-SCNC: 142 MMOL/L (ref 136–144)
TSI SER-ACNC: 0.81 MIU/ML (ref 0.35–5.5)
WBC # BLD AUTO: 6.4 X10(3) UL (ref 4–13)

## 2017-07-10 PROCEDURE — 73610 X-RAY EXAM OF ANKLE: CPT | Performed by: FAMILY MEDICINE

## 2017-07-10 PROCEDURE — 99214 OFFICE O/P EST MOD 30 MIN: CPT | Performed by: FAMILY MEDICINE

## 2017-07-10 PROCEDURE — 85652 RBC SED RATE AUTOMATED: CPT | Performed by: FAMILY MEDICINE

## 2017-07-10 PROCEDURE — 73630 X-RAY EXAM OF FOOT: CPT | Performed by: FAMILY MEDICINE

## 2017-07-10 PROCEDURE — 82746 ASSAY OF FOLIC ACID SERUM: CPT | Performed by: FAMILY MEDICINE

## 2017-07-10 PROCEDURE — 80050 GENERAL HEALTH PANEL: CPT | Performed by: FAMILY MEDICINE

## 2017-07-10 PROCEDURE — 82306 VITAMIN D 25 HYDROXY: CPT | Performed by: FAMILY MEDICINE

## 2017-07-10 PROCEDURE — 36415 COLL VENOUS BLD VENIPUNCTURE: CPT | Performed by: FAMILY MEDICINE

## 2017-07-10 PROCEDURE — 82607 VITAMIN B-12: CPT | Performed by: FAMILY MEDICINE

## 2017-07-10 RX ORDER — ACETAMINOPHEN AND CODEINE PHOSPHATE 300; 30 MG/1; MG/1
TABLET ORAL
COMMUNITY
Start: 2017-06-09 | End: 2017-07-21 | Stop reason: ALTCHOICE

## 2017-07-10 NOTE — TELEPHONE ENCOUNTER
----- Message from Amanda Gorman MD sent at 7/10/2017 11:24 AM CDT -----  Chronic arthritic changes and possible gouty changes, please refer to Dr. Sully Mejía.

## 2017-07-10 NOTE — TELEPHONE ENCOUNTER
Called pt, went over results and POC below. Pt states understanding. Provided physician information below.  Informed pt, she will need to call [facility] to see if Dr. Bartolome Nguyen is a covered physician and if he is not, then she will need to find a podiatrist

## 2017-07-10 NOTE — PROGRESS NOTES
Chief Complaint:   Patient presents with:  Edema: of the left foot near ankle area and is very painful for 1wk- no known injury to the area     HPI:   This is a 55year old female presenting for for left ankle swelling and pain, history of scleroderma. Disp: 60 tablet Rfl: 1   Losartan Potassium 25 MG Oral Tab Take 1 tablet (25 mg total) by mouth daily. Disp: 30 tablet Rfl: 1   ibuprofen 600 MG Oral Tab Take 600 mg by mouth every 6 (six) hours as needed for Pain.  Disp:  Rfl:    TraMADol HCl 50 MG Oral Ta adenopathy. Does not bruise/bleed easily. Psychiatric/Behavioral: Negative for suicidal ideas, behavioral problems, sleep disturbance and agitation. The patient is not nervous/anxious.         EXAM:   /67 (BP Location: Left arm, Patient Position: Si Musculoskeletal: She exhibits no effusion. Left ankle: She exhibits decreased range of motion and swelling. Tenderness. Lateral malleolus tenderness found. Lymphadenopathy:     She has no cervical adenopathy.    Neurological: She is alert and jalil

## 2017-07-12 ENCOUNTER — PATIENT MESSAGE (OUTPATIENT)
Dept: FAMILY MEDICINE CLINIC | Facility: CLINIC | Age: 47
End: 2017-07-12

## 2017-07-12 NOTE — TELEPHONE ENCOUNTER
From: Lyndsey Sorenson  To: Rin Dean MD  Sent: 7/12/2017 8:17 AM CDT  Subject: Non-Urgent Medical Question    Hi Dr Malagon Guest. Pablo Yadav My foot still hurts and it is still swollen. Could i have a note for Thursday and Friday off. Pablo Yadav Thank You.

## 2017-07-21 ENCOUNTER — OFFICE VISIT (OUTPATIENT)
Dept: RHEUMATOLOGY | Facility: CLINIC | Age: 47
End: 2017-07-21

## 2017-07-21 VITALS
DIASTOLIC BLOOD PRESSURE: 66 MMHG | HEIGHT: 69 IN | TEMPERATURE: 99 F | WEIGHT: 127 LBS | BODY MASS INDEX: 18.81 KG/M2 | HEART RATE: 71 BPM | SYSTOLIC BLOOD PRESSURE: 104 MMHG

## 2017-07-21 DIAGNOSIS — M34.9 SCLERODERMA (HCC): Primary | ICD-10-CM

## 2017-07-21 DIAGNOSIS — M25.572 ACUTE LEFT ANKLE PAIN: ICD-10-CM

## 2017-07-21 DIAGNOSIS — Z51.81 THERAPEUTIC DRUG MONITORING: ICD-10-CM

## 2017-07-21 PROCEDURE — 99212 OFFICE O/P EST SF 10 MIN: CPT | Performed by: INTERNAL MEDICINE

## 2017-07-21 PROCEDURE — 99214 OFFICE O/P EST MOD 30 MIN: CPT | Performed by: INTERNAL MEDICINE

## 2017-07-21 RX ORDER — OMEGA-3S/DHA/EPA/FISH OIL/D3 300MG-1000
CAPSULE ORAL
COMMUNITY
End: 2019-05-01

## 2017-07-21 RX ORDER — GARLIC EXTRACT 500 MG
1 CAPSULE ORAL DAILY
COMMUNITY
End: 2019-10-16

## 2017-07-21 RX ORDER — LOSARTAN POTASSIUM 25 MG/1
25 TABLET ORAL DAILY
Qty: 30 TABLET | Refills: 6 | Status: SHIPPED | OUTPATIENT
Start: 2017-07-21 | End: 2018-02-26

## 2017-07-21 RX ORDER — FOLIC ACID 1 MG/1
1 TABLET ORAL DAILY
Qty: 30 TABLET | Refills: 11 | Status: SHIPPED | OUTPATIENT
Start: 2017-07-21 | End: 2017-12-27

## 2017-07-21 RX ORDER — PREDNISONE 2.5 MG
TABLET ORAL
Qty: 75 TABLET | Refills: 1 | Status: SHIPPED | OUTPATIENT
Start: 2017-07-21 | End: 2017-09-21

## 2017-07-21 NOTE — PATIENT INSTRUCTIONS
1. Get labs in septemeber -   2. Pulmonary function tests when able   3. Cont. Methotrexate 8 tablets a week and folic acid 1mg a day   4. Cont. losasrtan 25mg a day   5. Increase Prednisone to  7. 5mg ad ay x 2 weeks, then go down to 5mg a day   6.  Return

## 2017-07-21 NOTE — PROGRESS NOTES
Jose Schultz is a 55year old female who presents for Patient presents with:   Follow - Up: pt here for f/u. pt c/o left ankle swelling  Mouth Cold Sores (respiratory/integumentary): pt states hx of mouth sores x couple months that just recently heale feelsit's ok. She's on cough medication. She gets periods of fatigue. She's had this in the past before. She has passed out before - was taken to hospital about 2 years ago - high liver enzymes. She had labs in [facility].    She ha Oral Capsule Delayed Release Take 20 mg by mouth daily. Disp:  Rfl:    methotrexate 2.5 MG Oral Tab Take 8 tablets (20 mg total) by mouth every 7 days. Disp: 40 tablet Rfl: 1   folic acid 1 MG Oral Tab Take 1 tablet (1 mg total) by mouth daily.  Disp: 30 ta : no dysuria, no hx of miscarriages, no DVT Hx, no hx of OCP,   Neuro: gets  numbness or tingling in fingers - , no headache, no hx of seizures,   Psych: no hx of anxiety or depression  ENDO: no hx of thyroid disease, no hx of DM  Joint/Muscluskeltal: x10(3) uL 0.05 0.06    Immature Granulocyte Absolute      0.00 - 1.00 x10(3) uL 0.02 0.02    Neutrophils %      % 56.8 66.7    Lymphocytes %      % 32.3 27.6    Monocytes %      % 8.9 2.5    Eosinophils %      % 0.9 1.7    Basophils %      % 0.8 1.1    Imm EXAMINATION         Microscopic not indicated   GFR      >=60 87 99    MEAN PLATELET VOLUME      7.4 - 10.3 fL   8.2   PT      11.8 - 14.1 seconds  13.1    INR      0.89 - 1.12  1.02    C-REACTIVE PROTEIN      0.0 - 0.9 mg/dL   <0.5   SED RATE      0 - 25 recently healed. 1. Scleroderma -   Stable so far   Ct angio of hcest done in 5/2017 - with ER visit for plerusiy - showed no ILD   - cont.  Methotrexate 20mg a week and fa 1mg ad ay and pred 5mg a day   B/c of left ankel pian increase pred to 7.5mg f

## 2017-08-07 ENCOUNTER — TELEPHONE (OUTPATIENT)
Dept: ORTHOPEDICS CLINIC | Facility: CLINIC | Age: 47
End: 2017-08-07

## 2017-08-07 ENCOUNTER — TELEPHONE (OUTPATIENT)
Dept: FAMILY MEDICINE CLINIC | Facility: CLINIC | Age: 47
End: 2017-08-07

## 2017-08-07 NOTE — TELEPHONE ENCOUNTER
Call to Raj Lopez pt's mother. Notified that Dr. Marcelino Gomez is aware and pt needs to be seen. Notified pt's mother to call in for an appointment.  Verbalizes understanding

## 2017-08-07 NOTE — TELEPHONE ENCOUNTER
Leo Ramsey. Pt's mother asking for a psychiatrist.  Patient is crying all the time over nothing. Periods of depression previous  Down on herself being sick. Staying in her room. No verbalizeaiton of suicide. This episode is for three weeks.

## 2017-08-17 ENCOUNTER — TELEPHONE (OUTPATIENT)
Dept: RHEUMATOLOGY | Facility: CLINIC | Age: 47
End: 2017-08-17

## 2017-09-14 ENCOUNTER — APPOINTMENT (OUTPATIENT)
Dept: LAB | Age: 47
End: 2017-09-14
Attending: INTERNAL MEDICINE
Payer: COMMERCIAL

## 2017-09-14 ENCOUNTER — OFFICE VISIT (OUTPATIENT)
Dept: FAMILY MEDICINE CLINIC | Facility: CLINIC | Age: 47
End: 2017-09-14

## 2017-09-14 VITALS
OXYGEN SATURATION: 98 % | DIASTOLIC BLOOD PRESSURE: 62 MMHG | TEMPERATURE: 98 F | SYSTOLIC BLOOD PRESSURE: 100 MMHG | RESPIRATION RATE: 14 BRPM | BODY MASS INDEX: 18.66 KG/M2 | HEIGHT: 69 IN | WEIGHT: 126 LBS | HEART RATE: 67 BPM

## 2017-09-14 DIAGNOSIS — M34.9 SCLERODERMA (HCC): ICD-10-CM

## 2017-09-14 DIAGNOSIS — Z13.21 SCREENING FOR MALNUTRITION: ICD-10-CM

## 2017-09-14 DIAGNOSIS — E55.9 VITAMIN D DEFICIENCY: ICD-10-CM

## 2017-09-14 DIAGNOSIS — I10 ESSENTIAL HYPERTENSION: ICD-10-CM

## 2017-09-14 DIAGNOSIS — M25.572 ACUTE LEFT ANKLE PAIN: ICD-10-CM

## 2017-09-14 DIAGNOSIS — Z23 NEED FOR INFLUENZA VACCINATION: ICD-10-CM

## 2017-09-14 DIAGNOSIS — Z13.228 SCREENING FOR PHENYLKETONURIA (PKU): ICD-10-CM

## 2017-09-14 DIAGNOSIS — Z13.0 SCREENING FOR IRON DEFICIENCY ANEMIA: ICD-10-CM

## 2017-09-14 DIAGNOSIS — Z00.00 ANNUAL PHYSICAL EXAM: Primary | ICD-10-CM

## 2017-09-14 DIAGNOSIS — Z13.29 SCREENING FOR THYROID DISORDER: ICD-10-CM

## 2017-09-14 LAB
25-HYDROXYVITAMIN D (TOTAL): 37 NG/ML (ref 30–100)
ALT SERPL-CCNC: 42 U/L (ref 14–54)
AST SERPL-CCNC: 34 U/L (ref 15–41)
BILIRUB UR QL STRIP.AUTO: NEGATIVE
C-REACTIVE PROTEIN: <0.29 MG/DL (ref ?–1)
CHOLEST SMN-MCNC: 193 MG/DL (ref ?–200)
CLARITY UR REFRACT.AUTO: CLEAR
COLOR UR AUTO: YELLOW
CREAT BLD-MCNC: 0.66 MG/DL (ref 0.55–1.02)
ERYTHROCYTE [DISTWIDTH] IN BLOOD BY AUTOMATED COUNT: 14.6 % (ref 11.5–16)
GLUCOSE UR STRIP.AUTO-MCNC: NEGATIVE MG/DL
HCT VFR BLD AUTO: 40.4 % (ref 34–50)
HDLC SERPL-MCNC: 80 MG/DL (ref 45–?)
HDLC SERPL: 2.41 {RATIO} (ref ?–4.44)
HGB BLD-MCNC: 13.1 G/DL (ref 12–16)
KETONES UR STRIP.AUTO-MCNC: NEGATIVE MG/DL
LDLC SERPL CALC-MCNC: 101 MG/DL (ref ?–130)
LDLC SERPL-MCNC: 12 MG/DL (ref 5–40)
LEUKOCYTE ESTERASE UR QL STRIP.AUTO: NEGATIVE
MCH RBC QN AUTO: 31 PG (ref 27–33.2)
MCHC RBC AUTO-ENTMCNC: 32.4 G/DL (ref 31–37)
MCV RBC AUTO: 95.7 FL (ref 81–100)
NITRITE UR QL STRIP.AUTO: NEGATIVE
NONHDLC SERPL-MCNC: 113 MG/DL (ref ?–130)
PH UR STRIP.AUTO: 5 [PH] (ref 4.5–8)
PLATELET # BLD AUTO: 226 10(3)UL (ref 150–450)
PROT UR STRIP.AUTO-MCNC: NEGATIVE MG/DL
RBC # BLD AUTO: 4.22 X10(6)UL (ref 3.8–5.1)
RBC UR QL AUTO: NEGATIVE
RED CELL DISTRIBUTION WIDTH-SD: 50.5 FL (ref 35.1–46.3)
SED RATE-ML: 7 MM/HR (ref 0–25)
SP GR UR STRIP.AUTO: 1.01 (ref 1–1.03)
TRIGLYCERIDES: 61 MG/DL (ref ?–150)
URIC ACID: 2.9 MG/DL (ref 2.4–8)
UROBILINOGEN UR STRIP.AUTO-MCNC: <2 MG/DL
WBC # BLD AUTO: 4.9 X10(3) UL (ref 4–13)

## 2017-09-14 PROCEDURE — 80061 LIPID PANEL: CPT | Performed by: FAMILY MEDICINE

## 2017-09-14 PROCEDURE — 82306 VITAMIN D 25 HYDROXY: CPT | Performed by: FAMILY MEDICINE

## 2017-09-14 PROCEDURE — 36415 COLL VENOUS BLD VENIPUNCTURE: CPT | Performed by: FAMILY MEDICINE

## 2017-09-14 PROCEDURE — 99396 PREV VISIT EST AGE 40-64: CPT | Performed by: FAMILY MEDICINE

## 2017-09-15 NOTE — PROGRESS NOTES
Chief Complaint:   Patient presents with: Annual: physical     HPI:   This is a 55year old female presenting for physical. Due for labs and mammogram.   Scleroderma's stable, being followed by Rheum. No new complaints.      Pt has a history of GERD and is mg total) by mouth every 7 days. Disp: 40 tablet Rfl: 3   predniSONE 2.5 MG Oral Tab Take 7.5mg a day x 2 weeks, then decrease to 5mg a day Disp: 75 tablet Rfl: 1   folic acid 1 MG Oral Tab Take 1 tablet (1 mg total) by mouth daily.  Disp: 30 tablet Rfl: 11 100/62 (BP Location: Right arm, Patient Position: Sitting, Cuff Size: adult)   Pulse 67   Temp 97.6 °F (36.4 °C) (Oral)   Resp 14   Ht 69\"   Wt 126 lb   LMP 01/18/2013   SpO2 98%   BMI 18.61 kg/m²  Estimated body mass index is 18.61 kg/m² as calculated fr time. She displays normal reflexes. No cranial nerve deficit, sensory deficit or motor deficit. Coordination and gait normal.   Skin: Skin is warm and dry. No lesion and no rash noted. No erythema. No pallor.  Does not exhibit alopecia  Psychiatric: She has

## 2017-09-21 ENCOUNTER — HOSPITAL ENCOUNTER (OUTPATIENT)
Dept: MAMMOGRAPHY | Age: 47
Discharge: HOME | End: 2017-09-21
Attending: FAMILY MEDICINE
Payer: COMMERCIAL

## 2017-09-21 DIAGNOSIS — Z12.31 ENCOUNTER FOR SCREENING MAMMOGRAM FOR MALIGNANT NEOPLASM OF BREAST: ICD-10-CM

## 2017-09-21 PROCEDURE — 77067 SCR MAMMO BI INCL CAD: CPT | Performed by: FAMILY MEDICINE

## 2017-09-28 ENCOUNTER — PATIENT MESSAGE (OUTPATIENT)
Dept: FAMILY MEDICINE CLINIC | Facility: CLINIC | Age: 47
End: 2017-09-28

## 2017-09-28 ENCOUNTER — OFFICE VISIT (OUTPATIENT)
Dept: FAMILY MEDICINE CLINIC | Facility: CLINIC | Age: 47
End: 2017-09-28

## 2017-09-28 VITALS
RESPIRATION RATE: 16 BRPM | TEMPERATURE: 99 F | BODY MASS INDEX: 18.66 KG/M2 | DIASTOLIC BLOOD PRESSURE: 66 MMHG | HEART RATE: 67 BPM | WEIGHT: 126 LBS | HEIGHT: 69 IN | SYSTOLIC BLOOD PRESSURE: 102 MMHG | OXYGEN SATURATION: 98 %

## 2017-09-28 DIAGNOSIS — J02.0 STREP PHARYNGITIS: Primary | ICD-10-CM

## 2017-09-28 LAB
CONTROL LINE PRESENT WITH A CLEAR BACKGROUND (YES/NO): YES YES/NO
STREP GRP A CUL-SCR: POSITIVE

## 2017-09-28 PROCEDURE — 99213 OFFICE O/P EST LOW 20 MIN: CPT | Performed by: FAMILY MEDICINE

## 2017-09-28 PROCEDURE — 87880 STREP A ASSAY W/OPTIC: CPT | Performed by: FAMILY MEDICINE

## 2017-09-28 RX ORDER — AMOXICILLIN 875 MG/1
875 TABLET, COATED ORAL 2 TIMES DAILY
Qty: 20 TABLET | Refills: 0 | Status: SHIPPED | OUTPATIENT
Start: 2017-09-28 | End: 2017-10-16

## 2017-09-28 NOTE — PROGRESS NOTES
Patient presents with:  Sinus Problem: head/facial/neck pressure, tight dry cough and achy all over. Steve Limon is a 55year old female who presents for sore throat. Pain of the throat last  2  days.  Not worse or better, pain with swallowing Alcohol use: No                  REVIEW OF SYSTEMS:   GENERAL: feels well otherwise, no fever, no chills.   SKIN: no rashes  HEENT: no erythema of the eyes, no discharrge from the eyes,denies any other cold symptoms, no stuffy nose, no tinnitus

## 2017-09-29 ENCOUNTER — TELEPHONE (OUTPATIENT)
Dept: FAMILY MEDICINE CLINIC | Facility: CLINIC | Age: 47
End: 2017-09-29

## 2017-09-29 NOTE — TELEPHONE ENCOUNTER
Pt was seen by Dr. Cole De La Garza yesterday. Pt states she was prescribed Amoxicillin for strep. Pt states she can't take that because it causes c diff. Pt requesting another med. Pls call her.

## 2017-09-29 NOTE — TELEPHONE ENCOUNTER
From: Bhavya Prince  To: Chyna Bird MD  Sent: 9/28/2017 5:04 PM CDT  Subject: Prescription Question    Hi Dr Jaylin Hazel. I can not take the Amoxicillin 875mg as it causes C-Diff. Can you please give me another prescription that does not cause C-Diff.  Than

## 2017-09-29 NOTE — TELEPHONE ENCOUNTER
All antibiotics cause c diff, amoxil is lower risk, have her continue with amoxil and take it with probiotics.

## 2017-10-11 ENCOUNTER — PATIENT MESSAGE (OUTPATIENT)
Dept: FAMILY MEDICINE CLINIC | Facility: CLINIC | Age: 47
End: 2017-10-11

## 2017-10-11 DIAGNOSIS — J02.0 STREP PHARYNGITIS: ICD-10-CM

## 2017-10-12 NOTE — TELEPHONE ENCOUNTER
Ok, please check if any diarrhea, if not  Send over amoxil 875 mg po bid x 7 days, to take with oral probiotics.        If any diarrhea, will need to start flagyl

## 2017-10-12 NOTE — TELEPHONE ENCOUNTER
She has a history of c diff, I think we should retest before doing another course. She can come in for nurse visit for just strep (rapid) and strep culture.

## 2017-10-12 NOTE — TELEPHONE ENCOUNTER
From: Lukas Morris  To: Betsy Lopez MD  Sent: 10/11/2017 10:29 PM CDT  Subject: Non-Urgent Medical Question    Hi Dr Gayle Montes. No I do not need a doctors note have gone back to work.  Could i have a refill of the Amoxicillin Because my throat is a little

## 2017-10-14 ENCOUNTER — PATIENT MESSAGE (OUTPATIENT)
Dept: FAMILY MEDICINE CLINIC | Facility: CLINIC | Age: 47
End: 2017-10-14

## 2017-10-16 RX ORDER — AMOXICILLIN 875 MG/1
875 TABLET, COATED ORAL 2 TIMES DAILY
Qty: 14 TABLET | Refills: 0 | Status: SHIPPED | OUTPATIENT
Start: 2017-10-16 | End: 2017-11-02 | Stop reason: ALTCHOICE

## 2017-10-16 NOTE — TELEPHONE ENCOUNTER
From: Jad Troy  To: Francesca Shi MD  Sent: 10/14/2017 9:27 AM CDT  Subject: Other    There is no diarrhea.

## 2017-10-30 ENCOUNTER — PATIENT MESSAGE (OUTPATIENT)
Dept: FAMILY MEDICINE CLINIC | Facility: CLINIC | Age: 47
End: 2017-10-30

## 2017-10-30 DIAGNOSIS — K12.0 ORAL APHTHOUS ULCER: ICD-10-CM

## 2017-10-31 NOTE — TELEPHONE ENCOUNTER
From: Millie Thornton  To: Andrea Klein MD  Sent: 10/30/2017 10:56 PM CDT  Subject: Other    Hi Dr Dior Glass . I have new insurance it is [employer] . I have a really painful Ulcer on the inside of my lower lip on the right side.  C

## 2017-10-31 NOTE — TELEPHONE ENCOUNTER
Please see message above. Pt was last given Rx 4/10/17 and was last seen 9/28/17. Please advise.  Thank you

## 2017-11-01 RX ORDER — TRAMADOL HYDROCHLORIDE 50 MG/1
50 TABLET ORAL 3 TIMES DAILY PRN
Qty: 15 TABLET | Refills: 0 | COMMUNITY
Start: 2017-11-01 | End: 2019-05-01

## 2017-11-01 NOTE — TELEPHONE ENCOUNTER
Patient checking on status states her mouth infection is back and really needs the Tramadol, please call to advise

## 2017-11-02 ENCOUNTER — OFFICE VISIT (OUTPATIENT)
Dept: FAMILY MEDICINE CLINIC | Facility: CLINIC | Age: 47
End: 2017-11-02

## 2017-11-02 ENCOUNTER — TELEPHONE (OUTPATIENT)
Dept: FAMILY MEDICINE CLINIC | Facility: CLINIC | Age: 47
End: 2017-11-02

## 2017-11-02 VITALS
HEIGHT: 69 IN | WEIGHT: 126 LBS | DIASTOLIC BLOOD PRESSURE: 60 MMHG | TEMPERATURE: 99 F | HEART RATE: 74 BPM | SYSTOLIC BLOOD PRESSURE: 100 MMHG | BODY MASS INDEX: 18.66 KG/M2 | RESPIRATION RATE: 16 BRPM

## 2017-11-02 DIAGNOSIS — K13.79 RECURRENT MOUTH ULCERATION: ICD-10-CM

## 2017-11-02 DIAGNOSIS — K52.9 GASTROENTERITIS: Primary | ICD-10-CM

## 2017-11-02 PROCEDURE — 99213 OFFICE O/P EST LOW 20 MIN: CPT | Performed by: FAMILY MEDICINE

## 2017-11-02 RX ORDER — ONDANSETRON 4 MG/1
4 TABLET, FILM COATED ORAL EVERY 8 HOURS PRN
Qty: 20 TABLET | Refills: 2 | Status: SHIPPED | OUTPATIENT
Start: 2017-11-02 | End: 2019-05-01

## 2017-11-02 NOTE — TELEPHONE ENCOUNTER
See 10/30/17 My Chart. Patient was given Tramadol for this pain. Did she take it? LM on pt cell phone to call the office back.

## 2017-11-02 NOTE — PATIENT INSTRUCTIONS
Discharge Instructions: Eating a Clear Liquid Diet  Your healthcare provider has prescribed a clear liquid diet for you.  This temporary diet is often prescribed right before surgery or medical tests when you need to have your stomach and intestines free · Diarrhea that lasts for longer than 24 hours  · Vomiting that does not stop  · Trouble urinating  · Trouble passing gas  · Abdominal pain with bloating and cramping   Date Last Reviewed: 6/1/2017  © 9765-8100 [employer] [address]  [address]

## 2017-11-02 NOTE — TELEPHONE ENCOUNTER
Pt has ulcer in mouth and is painful and would like to know if there is something can be prescribed for the pain. She is not feeling well and wants a call back.

## 2017-11-02 NOTE — TELEPHONE ENCOUNTER
Pt calling back and states she has been taking it and it only works for a little while. .. She has been vomiting and just doesn't feel well.  pls call [phone]

## 2017-11-02 NOTE — PROGRESS NOTES
Helena Wells is a 55year old female who presents for symptoms of: The patient complaints of abdominal pain. Pain is located at Periumbilical. Pain is described as cramping. Severity is intermittent.  Associated symptoms: mild nausea and right lip daily. Disp: 30 tablet Rfl: 6   ibuprofen 600 MG Oral Tab Take 600 mg by mouth every 6 (six) hours as needed for Pain. Disp:  Rfl:    omeprazole 20 MG Oral Capsule Delayed Release Take 20 mg by mouth daily.  Disp:  Rfl:    Acidophilus/Pectin Oral Cap Take 1 mucosa. EYES:PERRLA, EOMI, sclera not icteric. NECK: supple,no adenopathy  LUNGS: clear to auscultation  CARDIO: RRR without murmur  GI: good BS's,no masses, HSM or tenderness, Crook negative, no guarding, no Psoas sign. No hernias.   EXTREMITIES: no favian

## 2017-11-24 ENCOUNTER — PATIENT MESSAGE (OUTPATIENT)
Dept: RHEUMATOLOGY | Facility: CLINIC | Age: 47
End: 2017-11-24

## 2017-11-24 DIAGNOSIS — M34.9 SCLERODERMA (HCC): ICD-10-CM

## 2017-12-27 ENCOUNTER — OFFICE VISIT (OUTPATIENT)
Dept: RHEUMATOLOGY | Facility: CLINIC | Age: 47
End: 2017-12-27

## 2017-12-27 VITALS
DIASTOLIC BLOOD PRESSURE: 79 MMHG | WEIGHT: 130 LBS | TEMPERATURE: 99 F | HEART RATE: 90 BPM | HEIGHT: 69 IN | BODY MASS INDEX: 19.26 KG/M2 | SYSTOLIC BLOOD PRESSURE: 122 MMHG

## 2017-12-27 DIAGNOSIS — R06.02 SOB (SHORTNESS OF BREATH): ICD-10-CM

## 2017-12-27 DIAGNOSIS — J02.9 SORE THROAT: ICD-10-CM

## 2017-12-27 DIAGNOSIS — M34.9 SCLERODERMA (HCC): Primary | ICD-10-CM

## 2017-12-27 DIAGNOSIS — Z51.81 THERAPEUTIC DRUG MONITORING: ICD-10-CM

## 2017-12-27 PROCEDURE — 87880 STREP A ASSAY W/OPTIC: CPT | Performed by: INTERNAL MEDICINE

## 2017-12-27 PROCEDURE — 99214 OFFICE O/P EST MOD 30 MIN: CPT | Performed by: INTERNAL MEDICINE

## 2017-12-27 PROCEDURE — 99212 OFFICE O/P EST SF 10 MIN: CPT | Performed by: INTERNAL MEDICINE

## 2017-12-27 RX ORDER — FOLIC ACID 1 MG/1
2 TABLET ORAL DAILY
Qty: 60 TABLET | Refills: 11 | Status: SHIPPED | OUTPATIENT
Start: 2017-12-27 | End: 2018-04-30

## 2017-12-27 RX ORDER — LOSARTAN POTASSIUM 50 MG/1
50 TABLET ORAL DAILY
Qty: 30 TABLET | Refills: 2 | Status: SHIPPED | OUTPATIENT
Start: 2017-12-27 | End: 2018-02-26

## 2017-12-27 NOTE — PROGRESS NOTES
Gaurang Sarmiento is a 52year old female who presents for Patient presents with:  CREST Scleroderma: hand and fingertip pain when it is cold out  Foot Pain: ankle pain  Back Pain: hip pain  Sore Throat: achiness  Fatigue  .    HPI:     I had the pleasure She gets periods of fatigue. She's had this in the past before. She has passed out before - was taken to hospital about 2 years ago - high liver enzymes. [facility] She had a 2decho at [facility] - that was good. about at that time one month ago - she is on normal doses now. The rayanud's is a little bit there in her feet. She's doing ok. The tips of her fingers are getting more sore.  She has small calcifications at tips of finger.s       Wt Readings from Last 2 • Cancer Daughter      lung   • Other Gwenevere Simi Son    • crr of liver [OTHER] Son    • RA [OTHER] Maternal Grandmother    • psoriasis [OTHER] Paternal Grandfather    • Cancer Sister    3 older brothers and 1 older sistsers.     Social History:  Smoking sta and hands and legs   sclerodactly with some capillary enhancemnt on left base of finger nails in 2-4th figners. Clarisa left 4th finger - there is some blacking discolartion of nail   No digital ulcer.    Left ankle mild swleling without tendneres.s   Left 1-5h background (yes/no)      Yes/No    Kit Lot #      Numeric    Kit Expiration Date      Date    URINE-COLOR      Yellow Yellow   CLARITY URINE      Clear Clear   SPECIFIC GRAVITY      1.001 - 1.030 1.015   GLUCOSE (URINE DIPSTICK)      Negative mg/dl Annmarie Montalvo findings. 5/14/2015 - us abd  CONCLUSION:  Unremarkable abdominal ultrasound survey. REviewed notes from dr. El Bello - last note in records is 1/2016 -     7/10/2017 - left foot xray   CONCLUSION:      DJD first metatarsophalangeal joint.  Small er year.     Summary:  1. Get labs today   2. Pulmonary function tests when able and check 2decho   3. Cont. Methotrexate 8 tablets a week and  Increase folic acid 2 mg a day   4. Cont. losasrtan  - increase to 50mg a day for raynaud's   5.  Increase Prednison

## 2017-12-28 ENCOUNTER — APPOINTMENT (OUTPATIENT)
Dept: LAB | Age: 47
End: 2017-12-28
Attending: INTERNAL MEDICINE
Payer: MEDICAID

## 2017-12-28 ENCOUNTER — OFFICE VISIT (OUTPATIENT)
Dept: FAMILY MEDICINE CLINIC | Facility: CLINIC | Age: 47
End: 2017-12-28

## 2017-12-28 VITALS
HEIGHT: 69 IN | DIASTOLIC BLOOD PRESSURE: 68 MMHG | OXYGEN SATURATION: 98 % | SYSTOLIC BLOOD PRESSURE: 116 MMHG | WEIGHT: 129 LBS | RESPIRATION RATE: 16 BRPM | BODY MASS INDEX: 19.11 KG/M2 | TEMPERATURE: 98 F | HEART RATE: 82 BPM

## 2017-12-28 DIAGNOSIS — M34.9 SCLERODERMA (HCC): ICD-10-CM

## 2017-12-28 DIAGNOSIS — R68.89 FLU-LIKE SYMPTOMS: Primary | ICD-10-CM

## 2017-12-28 DIAGNOSIS — Z51.81 THERAPEUTIC DRUG MONITORING: ICD-10-CM

## 2017-12-28 LAB
ALT SERPL-CCNC: 38 U/L (ref 14–54)
AST SERPL-CCNC: 24 U/L (ref 15–41)
C-REACTIVE PROTEIN: 1.15 MG/DL (ref ?–1)
CREAT BLD-MCNC: 0.68 MG/DL (ref 0.55–1.02)
ERYTHROCYTE [DISTWIDTH] IN BLOOD BY AUTOMATED COUNT: 13.6 % (ref 11.5–16)
HCT VFR BLD AUTO: 37.3 % (ref 34–50)
HGB BLD-MCNC: 12.3 G/DL (ref 12–16)
MCH RBC QN AUTO: 32.4 PG (ref 27–33.2)
MCHC RBC AUTO-ENTMCNC: 33 G/DL (ref 31–37)
MCV RBC AUTO: 98.2 FL (ref 81–100)
PLATELET # BLD AUTO: 192 10(3)UL (ref 150–450)
RBC # BLD AUTO: 3.8 X10(6)UL (ref 3.8–5.1)
RED CELL DISTRIBUTION WIDTH-SD: 49.1 FL (ref 35.1–46.3)
SED RATE-ML: 6 MM/HR (ref 0–25)
WBC # BLD AUTO: 5.4 X10(3) UL (ref 4–13)

## 2017-12-28 PROCEDURE — 36415 COLL VENOUS BLD VENIPUNCTURE: CPT

## 2017-12-28 PROCEDURE — 84460 ALANINE AMINO (ALT) (SGPT): CPT

## 2017-12-28 PROCEDURE — 82565 ASSAY OF CREATININE: CPT

## 2017-12-28 PROCEDURE — 85027 COMPLETE CBC AUTOMATED: CPT

## 2017-12-28 PROCEDURE — 99213 OFFICE O/P EST LOW 20 MIN: CPT | Performed by: PHYSICIAN ASSISTANT

## 2017-12-28 PROCEDURE — 86140 C-REACTIVE PROTEIN: CPT

## 2017-12-28 PROCEDURE — 84450 TRANSFERASE (AST) (SGOT): CPT

## 2017-12-28 PROCEDURE — 85652 RBC SED RATE AUTOMATED: CPT

## 2017-12-28 RX ORDER — OSELTAMIVIR PHOSPHATE 75 MG/1
75 CAPSULE ORAL 2 TIMES DAILY
Qty: 10 CAPSULE | Refills: 0 | Status: SHIPPED | OUTPATIENT
Start: 2017-12-28 | End: 2018-01-02

## 2017-12-28 RX ORDER — BENZONATATE 100 MG/1
100 CAPSULE ORAL 3 TIMES DAILY PRN
Qty: 30 CAPSULE | Refills: 0 | Status: SHIPPED | OUTPATIENT
Start: 2017-12-28 | End: 2018-01-08 | Stop reason: ALTCHOICE

## 2017-12-28 NOTE — PATIENT INSTRUCTIONS
- take tamiflu twice a day for 5 days to help treat the flu  - take tessalon perles up to 3 times per day for relief of cough  - be sure to take tamiflu with meals, as this medication can cause stomach upset, nausea, and vomiting  - if you experience these · Acetaminophen or ibuprofen will help ease your fever, muscle aches, and headache. Don’t give aspirin to anyone younger than 25 who has the flu. Aspirin can harm the liver. · Nausea and loss of appetite are common with the flu. Eat light meals.  Drink 6 t © 5311-3076 [address] [facility] [place]. All rights reserved. This information is not intended as a substitute for professional medical care. Always follow your healthcare professional's instructions.

## 2017-12-28 NOTE — PROGRESS NOTES
Patient presents with:  Cough: sore throat, body aches, fever, started 3/4 days ago, negative strep test yesterday   :    HPI:   Steven Cuevas is a 52year old female who presents for upper respiratory symptoms for  3  days.  Started suddenly Viscous 2 % Mouth/Throat Solution Dip q tip in soln and apply to mouth ulcer TID prn Disp: 25 mL Rfl: 0   TraMADol HCl 50 MG Oral Tab Take 1 tablet (50 mg total) by mouth 3 (three) times daily as needed for Pain.  Disp: 15 tablet Rfl: 0   Acidophilus/Pectin 98%   BMI 19.05 kg/m²   GENERAL: well developed, well nourished,in no apparent distress, acutely sick. SKIN: no rashes, no suspicious lesions, flushed. skin warm and clammy to touch.   EYES:PERRLA, EOMI, normal optic disk, conjunctiva are clear  HEENT: atr

## 2018-01-08 ENCOUNTER — OFFICE VISIT (OUTPATIENT)
Dept: FAMILY MEDICINE CLINIC | Facility: CLINIC | Age: 48
End: 2018-01-08

## 2018-01-08 VITALS
TEMPERATURE: 98 F | DIASTOLIC BLOOD PRESSURE: 70 MMHG | HEART RATE: 72 BPM | OXYGEN SATURATION: 99 % | SYSTOLIC BLOOD PRESSURE: 106 MMHG | HEIGHT: 68 IN | WEIGHT: 129 LBS | RESPIRATION RATE: 16 BRPM | BODY MASS INDEX: 19.55 KG/M2

## 2018-01-08 DIAGNOSIS — K12.1 MOUTH ULCERS: Primary | ICD-10-CM

## 2018-01-08 DIAGNOSIS — J20.9 ACUTE BRONCHITIS, UNSPECIFIED ORGANISM: ICD-10-CM

## 2018-01-08 PROCEDURE — 99213 OFFICE O/P EST LOW 20 MIN: CPT | Performed by: PHYSICIAN ASSISTANT

## 2018-01-08 RX ORDER — CEPHALEXIN 500 MG/1
500 CAPSULE ORAL 2 TIMES DAILY
Qty: 20 CAPSULE | Refills: 0 | Status: SHIPPED | OUTPATIENT
Start: 2018-01-08 | End: 2018-01-18

## 2018-01-08 NOTE — PROGRESS NOTES
CHIEF COMPLAINT:   Patient presents with:  Sore Throat: 4 days. HPI:   Jack Hebert is a 52year old female who presents for sore throat for  4 days.   Reports recently diagnosed with influenza on 12/28, took tamiflu and tessalon but only for a few • Esophageal reflux    • Scleroderma (HCC)    • Scleroderma (HCC)       Past Surgical History:  No date: HYSTERECTOMY      Comment: AGE 33  4/2015 : ARIANA BIOPSY STEREOTACTIC NODULE 1 SITE RIGHT      Comment: Benign  No date: OOPHORECTOMY      Comment: AGE 3 Elysia Sims is a 52year old female who presents with     ASSESSMENT: Mouth ulcers  (primary encounter diagnosis)  Acute bronchitis, unspecified organism     PLAN: Meds as below- pt reports SE with most medications and is unable to tolerate many ant Acute bronchitis almost always starts as a viral respiratory infection, such as a cold or the flu. Certain factors make it more likely for a cold or flu to turn into bronchitis.  These include being very young, being elderly, having a heart or lung problem, Follow up with your doctor as you are told. You will likely feel better in a week or two. But a dry cough can linger beyond that time.  Let your doctor know if you still have symptoms (other than a dry cough) after 2 weeks, or if you’re prone to getting bro Canker sores are found on the lining of the mouth. They can be inside the cheeks or lips, on the roof of the mouth, at the base of the gums, on the tongue, or in the back of the throat.  Canker sores typically have these characteristics:  · Small, flat (not · Inability to swallow  · Extreme drowsiness or trouble awakening  · Fainting or loss of consciousness  · Rapid heart rate  · Seizure  · Stiff neck  When to seek medical advice  Call your healthcare provider right away if any of these occur:  · You have a

## 2018-01-08 NOTE — PATIENT INSTRUCTIONS
Acute Bronchitis  Your healthcare provider has told you that you have acute bronchitis. Bronchitis is infection or inflammation of the bronchial tubes (airways in the lungs). Normally, air moves easily in and out of the airways.  Bronchitis narrows the ai · Drink plenty of fluids, such as water, juice, or warm soup. Fluids loosen mucus so that you can cough it up. This helps you breathe more easily. Fluids also prevent dehydration. · Make sure you get plenty of rest.  · Do not smoke.  Do not allow anyone el · Poor diet, or lack of certain nutrients, including B vitamins and iron  · Foods that can irritate the mouth, including tomatoes, citrus fruits, and some nuts (foods that are acidic or contain bitter substances called tannins)  · Irritating chemicals, suc · Mix equal amounts of hydrogen peroxide and water. You can use it as a mouthwash or dab it on spots with a cotton swab. You can also add sodium bicarbonate to this to make a paste, and then dab it on spots.   Follow-up care  Follow up with your healthcare

## 2018-01-15 ENCOUNTER — OFFICE VISIT (OUTPATIENT)
Dept: FAMILY MEDICINE CLINIC | Facility: CLINIC | Age: 48
End: 2018-01-15

## 2018-01-15 ENCOUNTER — TELEPHONE (OUTPATIENT)
Dept: FAMILY MEDICINE CLINIC | Facility: CLINIC | Age: 48
End: 2018-01-15

## 2018-01-15 ENCOUNTER — LAB ENCOUNTER (OUTPATIENT)
Dept: LAB | Age: 48
End: 2018-01-15
Attending: FAMILY MEDICINE
Payer: MEDICAID

## 2018-01-15 VITALS
TEMPERATURE: 99 F | SYSTOLIC BLOOD PRESSURE: 106 MMHG | BODY MASS INDEX: 18.83 KG/M2 | WEIGHT: 120 LBS | DIASTOLIC BLOOD PRESSURE: 72 MMHG | RESPIRATION RATE: 16 BRPM | HEIGHT: 67 IN | OXYGEN SATURATION: 98 % | HEART RATE: 86 BPM

## 2018-01-15 DIAGNOSIS — J02.9 SORE THROAT: ICD-10-CM

## 2018-01-15 DIAGNOSIS — J02.9 SORE THROAT: Primary | ICD-10-CM

## 2018-01-15 DIAGNOSIS — I73.00 RAYNAUD'S DISEASE WITHOUT GANGRENE: ICD-10-CM

## 2018-01-15 DIAGNOSIS — M34.9 SCLERODERMA (HCC): ICD-10-CM

## 2018-01-15 LAB
ALBUMIN SERPL-MCNC: 4.2 G/DL (ref 3.5–4.8)
ALP LIVER SERPL-CCNC: 95 U/L (ref 39–100)
ALT SERPL-CCNC: 44 U/L (ref 14–54)
AST SERPL-CCNC: 28 U/L (ref 15–41)
BASOPHILS # BLD AUTO: 0.04 X10(3) UL (ref 0–0.1)
BASOPHILS NFR BLD AUTO: 0.7 %
BILIRUB SERPL-MCNC: 1.2 MG/DL (ref 0.1–2)
BUN BLD-MCNC: 11 MG/DL (ref 8–20)
CALCIUM BLD-MCNC: 9.9 MG/DL (ref 8.3–10.3)
CHLORIDE: 102 MMOL/L (ref 101–111)
CO2: 27 MMOL/L (ref 22–32)
CONTROL LINE PRESENT WITH A CLEAR BACKGROUND (YES/NO): YES YES/NO
CREAT BLD-MCNC: 0.69 MG/DL (ref 0.55–1.02)
EOSINOPHIL # BLD AUTO: 0.1 X10(3) UL (ref 0–0.3)
EOSINOPHIL NFR BLD AUTO: 1.9 %
ERYTHROCYTE [DISTWIDTH] IN BLOOD BY AUTOMATED COUNT: 14.8 % (ref 11.5–16)
GLUCOSE BLD-MCNC: 85 MG/DL (ref 70–99)
HCT VFR BLD AUTO: 37.6 % (ref 34–50)
HGB BLD-MCNC: 12.4 G/DL (ref 12–16)
IMMATURE GRANULOCYTE COUNT: 0.02 X10(3) UL (ref 0–1)
IMMATURE GRANULOCYTE RATIO %: 0.4 %
LYMPHOCYTES # BLD AUTO: 1.77 X10(3) UL (ref 0.9–4)
LYMPHOCYTES NFR BLD AUTO: 32.8 %
M PROTEIN MFR SERPL ELPH: 7.7 G/DL (ref 6.1–8.3)
MCH RBC QN AUTO: 32.8 PG (ref 27–33.2)
MCHC RBC AUTO-ENTMCNC: 33 G/DL (ref 31–37)
MCV RBC AUTO: 99.5 FL (ref 81–100)
MONOCYTES # BLD AUTO: 0.37 X10(3) UL (ref 0.1–0.6)
MONOCYTES NFR BLD AUTO: 6.9 %
NEUTROPHIL ABS PRELIM: 3.1 X10 (3) UL (ref 1.3–6.7)
NEUTROPHILS # BLD AUTO: 3.1 X10(3) UL (ref 1.3–6.7)
NEUTROPHILS NFR BLD AUTO: 57.3 %
PLATELET # BLD AUTO: 209 10(3)UL (ref 150–450)
POTASSIUM SERPL-SCNC: 3.7 MMOL/L (ref 3.6–5.1)
RBC # BLD AUTO: 3.78 X10(6)UL (ref 3.8–5.1)
RED CELL DISTRIBUTION WIDTH-SD: 52.6 FL (ref 35.1–46.3)
SODIUM SERPL-SCNC: 139 MMOL/L (ref 136–144)
WBC # BLD AUTO: 5.4 X10(3) UL (ref 4–13)

## 2018-01-15 PROCEDURE — 87880 STREP A ASSAY W/OPTIC: CPT | Performed by: FAMILY MEDICINE

## 2018-01-15 PROCEDURE — 80053 COMPREHEN METABOLIC PANEL: CPT | Performed by: FAMILY MEDICINE

## 2018-01-15 PROCEDURE — 87081 CULTURE SCREEN ONLY: CPT | Performed by: FAMILY MEDICINE

## 2018-01-15 PROCEDURE — 85025 COMPLETE CBC W/AUTO DIFF WBC: CPT | Performed by: FAMILY MEDICINE

## 2018-01-15 PROCEDURE — 36415 COLL VENOUS BLD VENIPUNCTURE: CPT | Performed by: FAMILY MEDICINE

## 2018-01-15 PROCEDURE — 99214 OFFICE O/P EST MOD 30 MIN: CPT | Performed by: FAMILY MEDICINE

## 2018-01-15 NOTE — TELEPHONE ENCOUNTER
I spoke with patient and advised her that tramadol can be prescribed for her. She is going to go pay for the trippiece. Tramadol not called in.

## 2018-01-15 NOTE — TELEPHONE ENCOUNTER
I spoke with patient and made her aware that the out of pocket cost for Magic Mouthwash is $54.00 She is wondering if there is an alternative. Please advise.

## 2018-01-15 NOTE — TELEPHONE ENCOUNTER
maalox/diphenhydramine/sucralfate Oral Suspension     This medication is not covered by the insurance.  Patient stated a different medication needs to be called in or the pharmacy said the doctors office needs to call the insurance company

## 2018-01-16 ENCOUNTER — PATIENT MESSAGE (OUTPATIENT)
Dept: FAMILY MEDICINE CLINIC | Facility: CLINIC | Age: 48
End: 2018-01-16

## 2018-01-16 ENCOUNTER — TELEPHONE (OUTPATIENT)
Dept: FAMILY MEDICINE CLINIC | Facility: CLINIC | Age: 48
End: 2018-01-16

## 2018-01-16 NOTE — TELEPHONE ENCOUNTER
Patient found an ENT who takes her insurance Dr. Cris Renteria but can not get her in until June but if we call rty maybe able to get her in sooner and would like us to give her the referral and call their office and schedule an appointment please call p

## 2018-01-16 NOTE — TELEPHONE ENCOUNTER
From: Katlyn Espinal  To: Dk Chiang MD  Sent: 1/16/2018 12:59 PM CST  Subject: Other    Hi Dr Maria T Cadena.  I just wanted to know if you had a moment to contact Dr Ralph Hang the [facility] . I called your office this morning and left a messa

## 2018-01-16 NOTE — TELEPHONE ENCOUNTER
Kenton Carrasco, noted, we tried calling and they couldn't see her either, have patient get on waiting list and I will take care of her till she sees ENT, also have her call her medicaid insurance for an ENT doctor too.

## 2018-01-16 NOTE — TELEPHONE ENCOUNTER
Called Dr. Mary Menon office and spoke with Basil Johnson. She states that with patient's insurance the soonest available appointment is June. She states that they have blocks in the provider's schedule for medicaid plans and the next opening is not until June.

## 2018-01-17 ENCOUNTER — PATIENT MESSAGE (OUTPATIENT)
Dept: FAMILY MEDICINE CLINIC | Facility: CLINIC | Age: 48
End: 2018-01-17

## 2018-01-17 NOTE — TELEPHONE ENCOUNTER
Colleen Vincent MD      1/16/18 4:49 PM   Note      Ok, noted, we tried calling and they couldn't see her either, have patient get on waiting list and I will take care of her till she sees ENT, also have her call her medicaid insurance for an ENT doctor too.

## 2018-01-17 NOTE — TELEPHONE ENCOUNTER
From: Jad Troy  To: Francesca Shi MD  Sent: 1/17/2018 3:19 PM CST  Subject: Other    Hi Dr Sunitha Og . I have an appointment on Friday at 2 pm to see Dr Tam Weber the E. N.T

## 2018-01-19 ENCOUNTER — TELEPHONE (OUTPATIENT)
Dept: FAMILY MEDICINE CLINIC | Facility: CLINIC | Age: 48
End: 2018-01-19

## 2018-01-19 NOTE — TELEPHONE ENCOUNTER
Spoke to patient's mother Francine Rudd per HIPAA. Advised ok for letter. Mom requesting letter be placed up front. She will  for patient tomorrow morning between 8 am- 1 pm. No further questions. Letter placed up front for pickup.

## 2018-01-19 NOTE — TELEPHONE ENCOUNTER
Patients mother called due to patient unable to talk still. She went to the throat specialist and still can not talk needs a note for an additional week off.  Contact when this is ready for

## 2018-01-19 NOTE — PROGRESS NOTES
CHIEF COMPLAINT:   Patient presents with:  Sore Throat    HPI:   Brit Blank is a 52year old female who presents for sore throat for  4 days.   Reports recently diagnosed with influenza on 12/28, took tamiflu and tessalon but only for a few days as omeprazole 20 MG Oral Capsule Delayed Release Take 20 mg by mouth daily.  Disp:  Rfl:       Past Medical History:   Diagnosis Date   • Esophageal reflux    • Scleroderma ([facility] [address]    • Scleroderma ([address]       Past Surgical History:  No date: HYSTERECTOMY bilaterally, no wheezes or rhonchi. Breathing is non labored. +dry cough. CARDIO: RRR without murmur. LYMPH: No lymphadenopathy.       ASSESSMENT AND PLAN:   Esperanza Billings is a 52year old female who presents with     ASSESSMENT: Sore throat  (prima

## 2018-02-06 ENCOUNTER — PATIENT MESSAGE (OUTPATIENT)
Dept: FAMILY MEDICINE CLINIC | Facility: CLINIC | Age: 48
End: 2018-02-06

## 2018-02-07 NOTE — TELEPHONE ENCOUNTER
From: Jad Troy  To: Francesca Shi MD  Sent: 2/6/2018 7:06 PM CST  Subject: Other    Hi Dr Sunitha Og. i went to the [facility]at Dr on January 19th and i told him what was going with my mouth and he gave me a prescription for Prednisone 10 mg.  My

## 2018-03-07 ENCOUNTER — TELEPHONE (OUTPATIENT)
Dept: FAMILY MEDICINE CLINIC | Facility: CLINIC | Age: 48
End: 2018-03-07

## 2018-03-07 NOTE — TELEPHONE ENCOUNTER
Patient states she was seen for sores in the mouth before and referred to ENT they prescribed prednisone 10MG and she called to get refill and they directed her to the pcp

## 2018-03-08 RX ORDER — PREDNISONE 10 MG/1
TABLET ORAL
Qty: 22 TABLET | Refills: 0 | Status: SHIPPED | OUTPATIENT
Start: 2018-03-08 | End: 2018-08-13

## 2018-03-08 NOTE — TELEPHONE ENCOUNTER
Per Dr. Basil Montanez patient can have what was provided by ENT previously. Called patient and spoke with her. Patient was unsure of dosing other than a 10mg tablet and it was a tapering dose. Called [facility] and spoke with Gail Whitfield.   She states that sheryl

## 2018-03-08 NOTE — TELEPHONE ENCOUNTER
The patient called the office looking for a status update on this. Told the patient we would call her back.

## 2018-03-08 NOTE — TELEPHONE ENCOUNTER
Called [facility] and spoke Hamida Kelsey. She wanted to verify quantity on Rx. Per directions patient needs 30 tablets not 22. Advised her that the direction are correct and okay to give enough tablets. Hamida Kelsey states understanding.

## 2018-04-19 ENCOUNTER — TELEPHONE (OUTPATIENT)
Dept: FAMILY MEDICINE CLINIC | Facility: CLINIC | Age: 48
End: 2018-04-19

## 2018-04-19 NOTE — TELEPHONE ENCOUNTER
Pt called asking if she has orders for vitamin D checkup, stated that she received a [employer] message on 4/18 that order is in.  We could not locate any future orders on file or [employer]

## 2018-04-20 ENCOUNTER — TELEPHONE (OUTPATIENT)
Dept: RHEUMATOLOGY | Facility: CLINIC | Age: 48
End: 2018-04-20

## 2018-04-20 DIAGNOSIS — R06.02 SOB (SHORTNESS OF BREATH): ICD-10-CM

## 2018-04-20 DIAGNOSIS — M34.9 SCLERODERMA (HCC): Primary | ICD-10-CM

## 2018-04-20 NOTE — TELEPHONE ENCOUNTER
CSS need to have new orders for the pt, orders has     Complete pulmonary test  2d echo pt does not want with/contrast     Please advise and call once orders are ready, CSS will call pt to book appt

## 2018-04-20 NOTE — TELEPHONE ENCOUNTER
Please see request below and advise. PA will need to be completed for echo due to insurance, contrast?

## 2018-04-22 ENCOUNTER — PATIENT MESSAGE (OUTPATIENT)
Dept: RHEUMATOLOGY | Facility: CLINIC | Age: 48
End: 2018-04-22

## 2018-04-22 DIAGNOSIS — M34.9 SCLERODERMA (HCC): ICD-10-CM

## 2018-04-23 NOTE — TELEPHONE ENCOUNTER
From: Gaurang Sarmiento  To: Shamar Neal MD  Sent: 4/22/2018 9:40 AM CDT  Subject: Other    Hi Dr Isabel Glover. I need a refill of Methotrexate 2.5 mg and Prednisone 2.5 mg . Thank You.

## 2018-04-23 NOTE — TELEPHONE ENCOUNTER
LOV: 12/27/17  Prednisone 5 mg daily? ?  Future Appointments  Date Time Provider Kim Verma   4/30/2018 9:40 AM Viridiana Randle MD [address] [facility]   Summary:  1. Get labs today   2. Pulmonary function tests when able and check 2decho   3.  Cont

## 2018-04-23 NOTE — TELEPHONE ENCOUNTER
Pending lab orders in Epic for Vitamin D and Lipid. Patient notified of this information via My Chart.

## 2018-04-26 ENCOUNTER — APPOINTMENT (OUTPATIENT)
Dept: LAB | Age: 48
End: 2018-04-26
Attending: FAMILY MEDICINE
Payer: MEDICAID

## 2018-04-26 DIAGNOSIS — M34.9 SCLERODERMA (HCC): ICD-10-CM

## 2018-04-26 DIAGNOSIS — E55.9 VITAMIN D DEFICIENCY: ICD-10-CM

## 2018-04-26 DIAGNOSIS — Z13.220 SCREENING FOR LIPID DISORDERS: ICD-10-CM

## 2018-04-26 DIAGNOSIS — Z51.81 THERAPEUTIC DRUG MONITORING: ICD-10-CM

## 2018-04-26 PROCEDURE — 80061 LIPID PANEL: CPT | Performed by: FAMILY MEDICINE

## 2018-04-26 PROCEDURE — 82306 VITAMIN D 25 HYDROXY: CPT | Performed by: FAMILY MEDICINE

## 2018-04-26 PROCEDURE — 36415 COLL VENOUS BLD VENIPUNCTURE: CPT | Performed by: FAMILY MEDICINE

## 2018-04-30 ENCOUNTER — OFFICE VISIT (OUTPATIENT)
Dept: RHEUMATOLOGY | Facility: CLINIC | Age: 48
End: 2018-04-30

## 2018-04-30 ENCOUNTER — PATIENT MESSAGE (OUTPATIENT)
Dept: RHEUMATOLOGY | Facility: CLINIC | Age: 48
End: 2018-04-30

## 2018-04-30 ENCOUNTER — APPOINTMENT (OUTPATIENT)
Dept: LAB | Facility: HOSPITAL | Age: 48
End: 2018-04-30
Attending: INTERNAL MEDICINE
Payer: MEDICAID

## 2018-04-30 VITALS
HEART RATE: 76 BPM | DIASTOLIC BLOOD PRESSURE: 62 MMHG | SYSTOLIC BLOOD PRESSURE: 110 MMHG | WEIGHT: 124 LBS | HEIGHT: 67 IN | BODY MASS INDEX: 19.46 KG/M2

## 2018-04-30 DIAGNOSIS — D64.9 ANEMIA, UNSPECIFIED TYPE: ICD-10-CM

## 2018-04-30 DIAGNOSIS — K13.79 RECURRENT ORAL ULCERS: ICD-10-CM

## 2018-04-30 DIAGNOSIS — Z51.81 THERAPEUTIC DRUG MONITORING: ICD-10-CM

## 2018-04-30 DIAGNOSIS — E55.9 VITAMIN D DEFICIENCY: Primary | ICD-10-CM

## 2018-04-30 DIAGNOSIS — M34.9 SCLERODERMA (HCC): Primary | ICD-10-CM

## 2018-04-30 PROCEDURE — 84466 ASSAY OF TRANSFERRIN: CPT

## 2018-04-30 PROCEDURE — 82728 ASSAY OF FERRITIN: CPT

## 2018-04-30 PROCEDURE — 99212 OFFICE O/P EST SF 10 MIN: CPT | Performed by: INTERNAL MEDICINE

## 2018-04-30 PROCEDURE — 83540 ASSAY OF IRON: CPT

## 2018-04-30 PROCEDURE — 36415 COLL VENOUS BLD VENIPUNCTURE: CPT

## 2018-04-30 PROCEDURE — 99214 OFFICE O/P EST MOD 30 MIN: CPT | Performed by: INTERNAL MEDICINE

## 2018-04-30 PROCEDURE — 82607 VITAMIN B-12: CPT

## 2018-04-30 PROCEDURE — 85045 AUTOMATED RETICULOCYTE COUNT: CPT

## 2018-04-30 RX ORDER — PREDNISONE 2.5 MG
2.5 TABLET ORAL 2 TIMES DAILY
Qty: 60 TABLET | Refills: 3 | Status: SHIPPED | OUTPATIENT
Start: 2018-04-30 | End: 2018-09-10

## 2018-04-30 RX ORDER — FOLIC ACID 1 MG/1
3 TABLET ORAL DAILY
Qty: 90 TABLET | Refills: 11 | Status: SHIPPED | OUTPATIENT
Start: 2018-04-30 | End: 2019-05-06

## 2018-04-30 NOTE — TELEPHONE ENCOUNTER
From: Yonathan Expose  To: Pamella Sanchez MD  Sent: 4/30/2018 1:23 PM CDT  Subject: Non-Urgent Medical Question    Hi Dr Sally Goldman . Could you please refill the prednisone .  Thank you

## 2018-04-30 NOTE — PATIENT INSTRUCTIONS
1. Get urine   2. Pulmonary function tests and  2decho on 5/14/2018   3. Cont. Methotrexate 8 tablets a week and  Increase folic acid 3 mg a day   4. Cont. losasrtan  -25 - 50mg a day for raynaud's   5. Cont. Prednisone 5mg a day   6.  Return to [facility] in

## 2018-04-30 NOTE — PROGRESS NOTES
Stephen Contreras is a 52year old female who presents for Patient presents with:  Raynaud's Syndrome: mouth sores  Hand Pain  Finger Pain  Ankle Pain: swelling  Fatigue  Arm Pain  .    HPI:     I had the pleasure of seeing Stephen Contreras on 3/27/2017 this in the past before. She has passed out before - was taken to hospital about 2 years ago - high liver enzymes. She had labs in novemer at [place]. She had a 2decho at [facility] - that was good. She didn't have the PFT done recently. normal doses now. The rayanud's is a little bit there in her feet. She's doing ok. The tips of her fingers are getting more sore. She has small calcifications at tips of finger. s     4/30/2018  She was getting colds and flu in Winter.  She had flare up of mg total) by mouth 3 (three) times daily as needed for Pain. Disp: 15 tablet Rfl: 0   Acidophilus/Pectin Oral Cap Take 1 capsule by mouth daily. Disp:  Rfl:    Cholecalciferol (VITAMIN D3) 400 units Oral Tab Take by mouth.  Disp:  Rfl:    ibuprofen 600 MG O had dry mouth, gets Raynaud's - gets sores on her fingers - , no nasal ulcers, no parotid swelling, no neck pain, no jaw pain, no temple pain  Eyes: No visual changes,   CVS: No chest pain, no heart disease  RS: No SOB, no Cough, No Pleurtic pain,   GI: No 35.1 - 46.3 fL 60.0 (H)   CHOLESTEROL, TOTAL      <200 mg/dL 183   Triglycerides      <150 mg/dL 61   HDL Cholesterol      >45 mg/dL 60   LDL Cholesterol Calc      <130 mg/dL 111   VLDL      5 - 40 mg/dL 12   T. CHOL/HDL RATIO      <4.44 3.05   NON HDL CHO Scleroderma -   Stable so far   Main issue is with oral ulcers - see below  Ct angio of hcest done in 5/2017 - with ER visit for plerusiy - showed no ILD   - cont.  Methotrexate 20mg a week and fa 1mg ad ay and pred 5mg a day   Left ankle pain - it's better

## 2018-04-30 NOTE — TELEPHONE ENCOUNTER
From: Allegra Jama  To: Marc Cazares MD  Sent: 4/30/2018 12:53 PM CDT  Subject: Visit Follow-up Question    Hi Dr Roni Ace.  My insurance company said that you have to call them with the codes that they need for the [facility] Function test s

## 2018-05-14 ENCOUNTER — RT VISIT (OUTPATIENT)
Dept: RESPIRATORY THERAPY | Facility: HOSPITAL | Age: 48
End: 2018-05-14
Attending: INTERNAL MEDICINE
Payer: MEDICAID

## 2018-05-14 ENCOUNTER — HOSPITAL ENCOUNTER (OUTPATIENT)
Dept: CV DIAGNOSTICS | Facility: HOSPITAL | Age: 48
Discharge: HOME | End: 2018-05-14
Attending: INTERNAL MEDICINE
Payer: MEDICAID

## 2018-05-14 DIAGNOSIS — R06.02 SOB (SHORTNESS OF BREATH): ICD-10-CM

## 2018-05-14 DIAGNOSIS — M34.9 SCLERODERMA (HCC): ICD-10-CM

## 2018-05-14 PROCEDURE — 94726 PLETHYSMOGRAPHY LUNG VOLUMES: CPT

## 2018-05-14 PROCEDURE — 94010 BREATHING CAPACITY TEST: CPT

## 2018-05-14 PROCEDURE — 93306 TTE W/DOPPLER COMPLETE: CPT | Performed by: INTERNAL MEDICINE

## 2018-05-14 PROCEDURE — 94729 DIFFUSING CAPACITY: CPT

## 2018-05-15 NOTE — PROCEDURES
Spirometry, flow volume loop, lung volumes are all within normal limits. No evidence of restriction or obstruction.  \  There is mild increased RV / TLC ratio with normal TLC , suggestive of air trapping     The diffusing capacity is normal    Compared t

## 2018-07-09 ENCOUNTER — TELEPHONE (OUTPATIENT)
Dept: RHEUMATOLOGY | Facility: CLINIC | Age: 48
End: 2018-07-09

## 2018-07-09 NOTE — TELEPHONE ENCOUNTER
Pt called in regards to the letter she received about outstanding lab orders. Pt stated that she had all of her orders completed from Dr. Jacqueline Michelle. Pt stated that the Urinalysis order was completed on 4/30.   Pt is requesting to have this order removed

## 2018-07-10 ENCOUNTER — TELEPHONE (OUTPATIENT)
Dept: FAMILY MEDICINE CLINIC | Facility: CLINIC | Age: 48
End: 2018-07-10

## 2018-07-10 NOTE — TELEPHONE ENCOUNTER
Called and left detailed message on pt's vm stating that the vitamin D order placed 4/30/18 is not to be drawn until 10/30/18. Ok per [employer]. Pt advised to call the office back with any questions.

## 2018-07-10 NOTE — TELEPHONE ENCOUNTER
Patient received a letter in the mail stating she needed to complete her labs for VITAMIN D, 25-HYDROXY   But patient already had this done on 4/26/18. She would like the order for 4/30/18 for VITAMIN D, 25-HYDROXY   Removed from EPIC.

## 2018-08-13 ENCOUNTER — OFFICE VISIT (OUTPATIENT)
Dept: RHEUMATOLOGY | Facility: CLINIC | Age: 48
End: 2018-08-13
Payer: MEDICAID

## 2018-08-13 ENCOUNTER — APPOINTMENT (OUTPATIENT)
Dept: LAB | Facility: HOSPITAL | Age: 48
End: 2018-08-13
Attending: INTERNAL MEDICINE
Payer: MEDICAID

## 2018-08-13 VITALS
HEART RATE: 69 BPM | WEIGHT: 127 LBS | DIASTOLIC BLOOD PRESSURE: 72 MMHG | HEIGHT: 67 IN | SYSTOLIC BLOOD PRESSURE: 117 MMHG | BODY MASS INDEX: 19.93 KG/M2

## 2018-08-13 DIAGNOSIS — M34.9 SCLERODERMA (HCC): ICD-10-CM

## 2018-08-13 DIAGNOSIS — Z51.81 THERAPEUTIC DRUG MONITORING: ICD-10-CM

## 2018-08-13 DIAGNOSIS — M34.9 SCLERODERMA (HCC): Primary | ICD-10-CM

## 2018-08-13 LAB
ALT SERPL-CCNC: 28 U/L (ref 14–54)
AST SERPL-CCNC: 32 U/L (ref 15–41)
CREAT SERPL-MCNC: 0.63 MG/DL (ref 0.5–1.5)
CRP SERPL-MCNC: <0.5 MG/DL (ref 0–0.9)
ERYTHROCYTE [DISTWIDTH] IN BLOOD BY AUTOMATED COUNT: 15.5 % (ref 11–15)
ERYTHROCYTE [SEDIMENTATION RATE] IN BLOOD: 8 MM/HR (ref 0–20)
HCT VFR BLD AUTO: 36.6 % (ref 35–48)
HGB BLD-MCNC: 12.2 G/DL (ref 12–16)
MCH RBC QN AUTO: 35.9 PG (ref 27–32)
MCHC RBC AUTO-ENTMCNC: 33.3 G/DL (ref 32–37)
MCV RBC AUTO: 107.7 FL (ref 80–100)
PLATELET # BLD AUTO: 194 K/UL (ref 140–400)
PMV BLD AUTO: 7.8 FL (ref 7.4–10.3)
RBC # BLD AUTO: 3.4 M/UL (ref 3.7–5.4)
WBC # BLD AUTO: 3.5 K/UL (ref 4–11)

## 2018-08-13 PROCEDURE — 36415 COLL VENOUS BLD VENIPUNCTURE: CPT

## 2018-08-13 PROCEDURE — 82565 ASSAY OF CREATININE: CPT

## 2018-08-13 PROCEDURE — 86140 C-REACTIVE PROTEIN: CPT

## 2018-08-13 PROCEDURE — 84450 TRANSFERASE (AST) (SGOT): CPT

## 2018-08-13 PROCEDURE — 99212 OFFICE O/P EST SF 10 MIN: CPT | Performed by: INTERNAL MEDICINE

## 2018-08-13 PROCEDURE — 99214 OFFICE O/P EST MOD 30 MIN: CPT | Performed by: INTERNAL MEDICINE

## 2018-08-13 PROCEDURE — 84460 ALANINE AMINO (ALT) (SGPT): CPT

## 2018-08-13 PROCEDURE — 85027 COMPLETE CBC AUTOMATED: CPT

## 2018-08-13 PROCEDURE — 85652 RBC SED RATE AUTOMATED: CPT

## 2018-08-13 RX ORDER — PREDNISONE 10 MG/1
10 TABLET ORAL DAILY
Qty: 30 TABLET | Refills: 0 | Status: SHIPPED | OUTPATIENT
Start: 2018-08-13 | End: 2018-09-28

## 2018-08-13 RX ORDER — GABAPENTIN 300 MG/1
300 CAPSULE ORAL NIGHTLY
Qty: 30 CAPSULE | Refills: 2 | Status: SHIPPED | OUTPATIENT
Start: 2018-08-13 | End: 2018-09-28

## 2018-08-13 NOTE — PROGRESS NOTES
Chi Montanez is a 52year old female who presents for Patient presents with:  CREST Scleroderma  Leg Pain: off balance, walking is difficult, short distance walking is painful, difficult to go up the stairs for two months  Foot Pain: heel pain  Hand PFT - not recently. She's had a cough recently since Feb. And it's better. She feelsit's ok. She's on cough medication. She gets periods of fatigue. She's had this in the past before.    She has passed out before - was taken to hospital about 2 year painful. She has swelling in both ankels that comes and goes. She increased the methotrexate and prednisone a little bit about at that time one month ago - she is on normal doses now. The rayanud's is a little bit there in her feet. She's doing ok.  The daily. Disp: 90 tablet Rfl: 11   predniSONE 2.5 MG Oral Tab Take 1 tablet (2.5 mg total) by mouth 2 (two) times daily. Disp: 60 tablet Rfl: 3   methotrexate 2.5 MG Oral Tab Take 8 tablets (20 mg total) by mouth every 7 days.  Disp: 40 tablet Rfl: 3   Arnoldart older sistsers. 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 Clarisa left 4th finger - there is some blacking discolartion of nail   No digital ulcer. Left ankle mild swleling without tendneres.s   Elbow tender -     Component      Latest Ref Rng & Units 4/30/2018 4/26/2018   WBC      4. complete  Spirometry, flow volume loop, lung volumes are all within normal   limits.    No evidence of restriction or obstruction.  \  There is mild increased RV / TLC ratio with normal TLC ,   suggestive of air trapping     The diffusing capacity is normal swelling      1. Scleroderma -   Stable so far   Main issue is with oral ulcers -is better   -now increasing leg pain - may be increasing skin tightness - related to scleroderma -  - cont.  methotreate - cant increase b/c ofher oral ulcers -   Increase pred in 3 months. .  6.  Check labs today        Gemma Burleson MD  8/13/2018   12:34 PM  - Reviewed [place]- information  through Epic

## 2018-08-13 NOTE — PATIENT INSTRUCTIONS
1. Add gabapentin 300mg at night   2. Cont. Methotrexate 8 tablets a week and  Cont.  folic acid 3 mg a day   3 Cont. losasrtan  -25 - 50mg a day for raynaud's   4. Cont. Prednisone - increase to 10mg a day x 1 monnth, then go back to 5mg a day   5.  Retu · dizziness  · nausea  · slurred speech  · tiredness  · tremors  · weight gain  What may interact with this medicine?   Do not take this medicine with any of the following medications:  · other gabapentin products  This medicine may also interact with the f Visit your doctor or health care professional for regular checks on your progress. You may want to keep a record at home of how you feel your condition is responding to treatment.  You may want to share this information with your doctor or health care profe

## 2018-09-08 ENCOUNTER — PATIENT MESSAGE (OUTPATIENT)
Dept: RHEUMATOLOGY | Facility: CLINIC | Age: 48
End: 2018-09-08

## 2018-09-10 RX ORDER — PREDNISONE 2.5 MG
2.5 TABLET ORAL 2 TIMES DAILY
Qty: 60 TABLET | Refills: 3 | Status: SHIPPED | OUTPATIENT
Start: 2018-09-10 | End: 2018-09-28

## 2018-09-10 NOTE — TELEPHONE ENCOUNTER
LOV: 8/13/18  No future appointments.   Labs:   Component      Latest Ref Rng & Units 8/13/2018   WBC      4.0 - 11.0 K/UL 3.5 (L)   RBC      3.70 - 5.40 M/UL 3.40 (L)   Hemoglobin      12.0 - 16.0 g/dL 12.2   Hematocrit      35.0 - 48.0 % 36.6   MCV      8

## 2018-09-10 NOTE — TELEPHONE ENCOUNTER
From: Annie Rothman  To: Ida Barrios MD  Sent: 9/8/2018 10:35 AM CDT  Subject: Other    Hi Dr Angela Beaulieu . I need a refill of Prednisone 2.5 mg . Thank You.

## 2018-09-28 ENCOUNTER — LAB ENCOUNTER (OUTPATIENT)
Dept: LAB | Age: 48
End: 2018-09-28
Attending: FAMILY MEDICINE
Payer: MEDICAID

## 2018-09-28 ENCOUNTER — OFFICE VISIT (OUTPATIENT)
Dept: FAMILY MEDICINE CLINIC | Facility: CLINIC | Age: 48
End: 2018-09-28
Payer: MEDICAID

## 2018-09-28 VITALS
WEIGHT: 129 LBS | HEART RATE: 80 BPM | DIASTOLIC BLOOD PRESSURE: 64 MMHG | SYSTOLIC BLOOD PRESSURE: 120 MMHG | BODY MASS INDEX: 20.25 KG/M2 | RESPIRATION RATE: 16 BRPM | TEMPERATURE: 98 F | HEIGHT: 67 IN

## 2018-09-28 DIAGNOSIS — Z13.228 SCREENING FOR ENDOCRINE, NUTRITIONAL, METABOLIC AND IMMUNITY DISORDER: ICD-10-CM

## 2018-09-28 DIAGNOSIS — Z13.29 SCREENING FOR ENDOCRINE, NUTRITIONAL, METABOLIC AND IMMUNITY DISORDER: ICD-10-CM

## 2018-09-28 DIAGNOSIS — M79.604 PAIN IN BOTH LOWER EXTREMITIES: ICD-10-CM

## 2018-09-28 DIAGNOSIS — I73.00 RAYNAUD'S DISEASE WITHOUT GANGRENE: ICD-10-CM

## 2018-09-28 DIAGNOSIS — M34.9 SCLERODERMA (HCC): ICD-10-CM

## 2018-09-28 DIAGNOSIS — Z12.31 ENCOUNTER FOR SCREENING MAMMOGRAM FOR MALIGNANT NEOPLASM OF BREAST: ICD-10-CM

## 2018-09-28 DIAGNOSIS — Z13.0 SCREENING FOR ENDOCRINE, NUTRITIONAL, METABOLIC AND IMMUNITY DISORDER: ICD-10-CM

## 2018-09-28 DIAGNOSIS — M79.605 PAIN IN BOTH LOWER EXTREMITIES: ICD-10-CM

## 2018-09-28 DIAGNOSIS — R55 PRE-SYNCOPE: ICD-10-CM

## 2018-09-28 DIAGNOSIS — E55.9 VITAMIN D DEFICIENCY: ICD-10-CM

## 2018-09-28 DIAGNOSIS — Z00.00 ANNUAL PHYSICAL EXAM: Primary | ICD-10-CM

## 2018-09-28 DIAGNOSIS — Z13.21 SCREENING FOR ENDOCRINE, NUTRITIONAL, METABOLIC AND IMMUNITY DISORDER: ICD-10-CM

## 2018-09-28 DIAGNOSIS — R26.81 GENERAL UNSTEADINESS: ICD-10-CM

## 2018-09-28 LAB
ALBUMIN SERPL-MCNC: 3.9 G/DL (ref 3.5–4.8)
ALBUMIN/GLOB SERPL: 1.5 {RATIO} (ref 1–2)
ALP LIVER SERPL-CCNC: 92 U/L (ref 39–100)
ALT SERPL-CCNC: 29 U/L (ref 14–54)
ANION GAP SERPL CALC-SCNC: 8 MMOL/L (ref 0–18)
AST SERPL-CCNC: 25 U/L (ref 15–41)
BASOPHILS # BLD AUTO: 0.03 X10(3) UL (ref 0–0.1)
BASOPHILS NFR BLD AUTO: 0.9 %
BILIRUB SERPL-MCNC: 1.4 MG/DL (ref 0.1–2)
BUN BLD-MCNC: 9 MG/DL (ref 8–20)
BUN/CREAT SERPL: 14.8 (ref 10–20)
CALCIUM BLD-MCNC: 9.1 MG/DL (ref 8.3–10.3)
CHLORIDE SERPL-SCNC: 108 MMOL/L (ref 101–111)
CHOLEST SMN-MCNC: 162 MG/DL (ref ?–200)
CO2 SERPL-SCNC: 27 MMOL/L (ref 22–32)
CREAT BLD-MCNC: 0.61 MG/DL (ref 0.55–1.02)
EOSINOPHIL # BLD AUTO: 0.03 X10(3) UL (ref 0–0.3)
EOSINOPHIL NFR BLD AUTO: 0.9 %
ERYTHROCYTE [DISTWIDTH] IN BLOOD BY AUTOMATED COUNT: 15.1 % (ref 11.5–16)
GLOBULIN PLAS-MCNC: 2.6 G/DL (ref 2.5–4)
GLUCOSE BLD-MCNC: 79 MG/DL (ref 70–99)
HCT VFR BLD AUTO: 32.1 % (ref 34–50)
HDLC SERPL-MCNC: 58 MG/DL (ref 40–59)
HGB BLD-MCNC: 10.4 G/DL (ref 12–16)
IMMATURE GRANULOCYTE COUNT: 0.01 X10(3) UL (ref 0–1)
IMMATURE GRANULOCYTE RATIO %: 0.3 %
LDLC SERPL CALC-MCNC: 91 MG/DL (ref ?–100)
LYMPHOCYTES # BLD AUTO: 1.11 X10(3) UL (ref 0.9–4)
LYMPHOCYTES NFR BLD AUTO: 32.3 %
M PROTEIN MFR SERPL ELPH: 6.5 G/DL (ref 6.1–8.3)
MCH RBC QN AUTO: 36 PG (ref 27–33.2)
MCHC RBC AUTO-ENTMCNC: 32.4 G/DL (ref 31–37)
MCV RBC AUTO: 111.1 FL (ref 81–100)
MONOCYTES # BLD AUTO: 0.21 X10(3) UL (ref 0.1–1)
MONOCYTES NFR BLD AUTO: 6.1 %
NEUTROPHIL ABS PRELIM: 2.05 X10 (3) UL (ref 1.3–6.7)
NEUTROPHILS # BLD AUTO: 2.05 X10(3) UL (ref 1.3–6.7)
NEUTROPHILS NFR BLD AUTO: 59.5 %
NONHDLC SERPL-MCNC: 104 MG/DL (ref ?–130)
OSMOLALITY SERPL CALC.SUM OF ELEC: 294 MOSM/KG (ref 275–295)
PLATELET # BLD AUTO: 199 10(3)UL (ref 150–450)
POTASSIUM SERPL-SCNC: 3.6 MMOL/L (ref 3.6–5.1)
RBC # BLD AUTO: 2.89 X10(6)UL (ref 3.8–5.1)
RED CELL DISTRIBUTION WIDTH-SD: 61.6 FL (ref 35.1–46.3)
SODIUM SERPL-SCNC: 143 MMOL/L (ref 136–144)
TRIGL SERPL-MCNC: 67 MG/DL (ref 30–149)
TSI SER-ACNC: 1.16 MIU/ML (ref 0.35–5.5)
VIT D+METAB SERPL-MCNC: 26.7 NG/ML (ref 30–100)
VLDLC SERPL CALC-MCNC: 13 MG/DL (ref 0–30)
WBC # BLD AUTO: 3.4 X10(3) UL (ref 4–13)

## 2018-09-28 PROCEDURE — 80053 COMPREHEN METABOLIC PANEL: CPT

## 2018-09-28 PROCEDURE — 99214 OFFICE O/P EST MOD 30 MIN: CPT | Performed by: FAMILY MEDICINE

## 2018-09-28 PROCEDURE — 36415 COLL VENOUS BLD VENIPUNCTURE: CPT

## 2018-09-28 PROCEDURE — 85025 COMPLETE CBC W/AUTO DIFF WBC: CPT

## 2018-09-28 PROCEDURE — 99396 PREV VISIT EST AGE 40-64: CPT | Performed by: FAMILY MEDICINE

## 2018-09-28 PROCEDURE — 84443 ASSAY THYROID STIM HORMONE: CPT

## 2018-09-28 PROCEDURE — 82306 VITAMIN D 25 HYDROXY: CPT

## 2018-09-28 PROCEDURE — 80061 LIPID PANEL: CPT

## 2018-09-28 RX ORDER — PREDNISONE 10 MG/1
10 TABLET ORAL DAILY
Qty: 14 TABLET | Refills: 0 | Status: SHIPPED | OUTPATIENT
Start: 2018-09-28 | End: 2018-10-23

## 2018-09-28 NOTE — PROGRESS NOTES
Chief Complaint:   Patient presents with:  Physical: leg/feet numbness and pain     HPI:   This is a 52year old female presenting with annual physical and multiple complaints. The patient has complaints of dizziness. Pt reports unsteadiness, falling. Smoking status: Never Smoker      Smokeless tobacco: Never Used    Alcohol use: No    Drug use: No    Family History:  Family History   Problem Relation Age of Onset   • Cancer Father         lung   • Other (Other) Mother    • Other (osteoarthritis) Moth Cardiovascular: Negative for chest pain, palpitations and leg swelling. Gastrointestinal: Negative for vomiting, abdominal pain, diarrhea, blood in stool and abdominal distention.    Endocrine: Negative for cold intolerance, heat intolerance, polydipsia discharge. No scleral icterus. Neck: Normal range of motion. Neck supple. No JVD present. No tracheal deviation present. No thyromegaly present. Cardiovascular: Normal rate, regular rhythm, normal heart sounds and intact distal pulses.   Exam reveals no without gangrene  -per Rheum, continue with MTX  - NEURO - INTERNAL  - MRI BRAIN (W+WO) (CPT=70013); Future    7. General unsteadiness  As asboave  - NEURO - INTERNAL  - MRI BRAIN (W+WO) (CPT=70323); Future    8.  Pre-syncope  -as above, will check labs and

## 2018-10-01 ENCOUNTER — TELEPHONE (OUTPATIENT)
Dept: FAMILY MEDICINE CLINIC | Facility: CLINIC | Age: 48
End: 2018-10-01

## 2018-10-01 DIAGNOSIS — D64.9 LOW HEMOGLOBIN: Primary | ICD-10-CM

## 2018-10-01 DIAGNOSIS — E55.9 VITAMIN D DEFICIENCY: ICD-10-CM

## 2018-10-01 RX ORDER — ERGOCALCIFEROL 1.25 MG/1
50000 CAPSULE ORAL WEEKLY
Qty: 4 CAPSULE | Refills: 3 | Status: SHIPPED | OUTPATIENT
Start: 2018-10-01 | End: 2019-02-12

## 2018-10-01 NOTE — TELEPHONE ENCOUNTER
----- Message from Tejal Lazo MD sent at 10/1/2018 12:42 PM CDT -----  Low vitamin D, needs vitamin D 50,000 units q weekly #4 with 3 refills then, 5000 units q daily maintenance   Repeat Vitamin D in 6 months.    Hemoglobin's low, please refer to Dr. Jacquie Anna

## 2018-10-01 NOTE — TELEPHONE ENCOUNTER
Called patient and spoke with her. Advised her of results and POC below. Patient states understanding. Patient requested Dr. Kenyetta Mendosa office information be sent to her My Chart. Information sent as requested.   Repeat lab order placed in Epic and Rx sen

## 2018-10-05 ENCOUNTER — TELEPHONE (OUTPATIENT)
Dept: FAMILY MEDICINE CLINIC | Facility: CLINIC | Age: 48
End: 2018-10-05

## 2018-10-05 NOTE — TELEPHONE ENCOUNTER
Patient left voice mail regarding MRI scheduling, returned call, Central Scheduling number provided.

## 2018-10-21 ENCOUNTER — PATIENT MESSAGE (OUTPATIENT)
Dept: FAMILY MEDICINE CLINIC | Facility: CLINIC | Age: 48
End: 2018-10-21

## 2018-10-22 ENCOUNTER — OFFICE VISIT (OUTPATIENT)
Dept: HEMATOLOGY/ONCOLOGY | Age: 48
End: 2018-10-22
Attending: INTERNAL MEDICINE
Payer: MEDICAID

## 2018-10-22 VITALS
TEMPERATURE: 98 F | SYSTOLIC BLOOD PRESSURE: 125 MMHG | BODY MASS INDEX: 20 KG/M2 | DIASTOLIC BLOOD PRESSURE: 80 MMHG | RESPIRATION RATE: 18 BRPM | HEART RATE: 86 BPM | OXYGEN SATURATION: 99 % | WEIGHT: 130 LBS

## 2018-10-22 DIAGNOSIS — D64.9 ANEMIA, UNSPECIFIED TYPE: Primary | ICD-10-CM

## 2018-10-22 DIAGNOSIS — D72.819 LEUKOPENIA, UNSPECIFIED TYPE: ICD-10-CM

## 2018-10-22 DIAGNOSIS — M34.9 SCLERODERMA (HCC): ICD-10-CM

## 2018-10-22 DIAGNOSIS — Z13.0 SCREENING FOR IRON DEFICIENCY ANEMIA: ICD-10-CM

## 2018-10-22 PROCEDURE — 99243 OFF/OP CNSLTJ NEW/EST LOW 30: CPT | Performed by: INTERNAL MEDICINE

## 2018-10-22 RX ORDER — PREDNISONE 10 MG/1
10 TABLET ORAL DAILY
Status: CANCELLED | OUTPATIENT
Start: 2018-10-22

## 2018-10-22 NOTE — TELEPHONE ENCOUNTER
Patient is requesting a refill of prednisone for sores in her mouth. OK to refill or advise OV?   LOV 9/28/18  LF 9/28/18

## 2018-10-22 NOTE — PROGRESS NOTES
Pt here for consult for low hgb. Pt has scleroderma, raynauds, and ulcers in her mouth. Pt having an MRI of her brain in November to see if the disease is spreading. Pt has whole body pain, weakness, and tightness.  Pt complaints of fatigue and dizziness, b

## 2018-10-22 NOTE — TELEPHONE ENCOUNTER
From: Millie Thornton  To: Andrea Klein MD  Sent: 10/21/2018 3:12 PM CDT  Subject: Non-Urgent Medical Question    Hi Dr Dior Glass. Can I please have a refill of Prednisone 10 mg ? I have 3 very painful sores in my mouth. Thank you.

## 2018-10-22 NOTE — TELEPHONE ENCOUNTER
She needs to follow up with her rheumatologist. She has had a history of oral mucositis and was previously treated with prednisone, however at her last rheumatology visit Dr. Ramírez Gorman discussed swabbing the lesions for a possible viral cause and she did

## 2018-10-22 NOTE — CONSULTS
[facility] Report of Consultation    Patient Name: Will Walsh   YOB: 1970   Medical Record Number: [id number]   CSN: [id number]   Consulting Physician: Franck Santiago MD  Referring Physician(s):  Josefina Castillo  Date of Consultation: file      Transportation needs - non-medical: Not on file    Occupational History      Not on file    Tobacco Use      Smoking status: Never Smoker      Smokeless tobacco: Never Used    Substance and Sexual Activity      Alcohol use: No      Drug use:  No as needed for Pain., Disp: , Rfl:   •  omeprazole 20 MG Oral Capsule Delayed Release, Take 20 mg by mouth daily. , Disp: , Rfl:     Review of Systems:    Constitutional: No chills, fevers, malaise, night sweats and weight loss.  Fatigue  Eyes: No visual dist chronic disease, may not improve until current issues resolve. Could also be due to methotrexate. # Mild leukopenia: Noted in the past but has resolved. Could be due to methotrexate.   Given immunosuppression, will check monoclonal protein studies to r

## 2018-10-23 ENCOUNTER — PATIENT MESSAGE (OUTPATIENT)
Dept: RHEUMATOLOGY | Facility: CLINIC | Age: 48
End: 2018-10-23

## 2018-10-23 ENCOUNTER — TELEPHONE (OUTPATIENT)
Dept: HEMATOLOGY/ONCOLOGY | Facility: HOSPITAL | Age: 48
End: 2018-10-23

## 2018-10-23 ENCOUNTER — TELEPHONE (OUTPATIENT)
Dept: FAMILY MEDICINE CLINIC | Facility: CLINIC | Age: 48
End: 2018-10-23

## 2018-10-23 RX ORDER — PREDNISONE 10 MG/1
10 TABLET ORAL DAILY
Qty: 30 TABLET | Refills: 0 | Status: SHIPPED | OUTPATIENT
Start: 2018-10-23 | End: 2019-04-08 | Stop reason: DRUGHIGH

## 2018-10-23 NOTE — TELEPHONE ENCOUNTER
Imtiaz Nicolas MD  [address] [facility] [address]             Call, need additional labs based on results from yesterday, ordered. Jj Brantley will call when all results back      Unable to reach pt.  Left VM stating MD needs additional lab work and requested pt call back

## 2018-10-23 NOTE — TELEPHONE ENCOUNTER
Pt called back and was informed of additional labs needed. She will go to outpatient clinic by her house to have drawn.

## 2018-10-23 NOTE — TELEPHONE ENCOUNTER
From: Steve Limon  To: Mariela Boyle MD  Sent: 10/23/2018 11:33 AM CDT  Subject: Prescription Question    Hi Dr Carmen Heart. Can i please have a refill of 10mg Prednisone? I have 4 painful sores in my mouth and it is hard to talk and to eat.

## 2018-10-25 ENCOUNTER — APPOINTMENT (OUTPATIENT)
Dept: LAB | Age: 48
End: 2018-10-25
Attending: INTERNAL MEDICINE
Payer: MEDICAID

## 2018-10-25 DIAGNOSIS — D59.19 OTHER AUTOIMMUNE HEMOLYTIC ANEMIAS: ICD-10-CM

## 2018-10-25 DIAGNOSIS — E83.19 IRON OVERLOAD: ICD-10-CM

## 2018-10-25 PROCEDURE — 81256 HFE GENE: CPT

## 2018-10-25 PROCEDURE — 36415 COLL VENOUS BLD VENIPUNCTURE: CPT

## 2018-11-01 ENCOUNTER — TELEPHONE (OUTPATIENT)
Dept: HEMATOLOGY/ONCOLOGY | Facility: HOSPITAL | Age: 48
End: 2018-11-01

## 2018-11-02 ENCOUNTER — TELEPHONE (OUTPATIENT)
Dept: HEMATOLOGY/ONCOLOGY | Facility: HOSPITAL | Age: 48
End: 2018-11-02

## 2018-11-02 NOTE — TELEPHONE ENCOUNTER
Spoke to patient, all labs reviewed. Anemia and mild leukopenia likely due to autoimmune disease underlying autoimmune disease as well as methotrexate. She is not on supplemental iron but iron level could be high due to inflammation.   May follow-up with

## 2018-11-05 ENCOUNTER — HOSPITAL ENCOUNTER (OUTPATIENT)
Dept: MRI IMAGING | Facility: HOSPITAL | Age: 48
Discharge: HOME | End: 2018-11-05
Attending: FAMILY MEDICINE
Payer: MEDICAID

## 2018-11-05 DIAGNOSIS — R55 PRE-SYNCOPE: ICD-10-CM

## 2018-11-05 DIAGNOSIS — M79.605 PAIN IN BOTH LOWER EXTREMITIES: ICD-10-CM

## 2018-11-05 DIAGNOSIS — I73.00 RAYNAUD'S DISEASE WITHOUT GANGRENE: ICD-10-CM

## 2018-11-05 DIAGNOSIS — R26.81 GENERAL UNSTEADINESS: ICD-10-CM

## 2018-11-05 DIAGNOSIS — M79.604 PAIN IN BOTH LOWER EXTREMITIES: ICD-10-CM

## 2018-11-05 PROCEDURE — 70553 MRI BRAIN STEM W/O & W/DYE: CPT | Performed by: FAMILY MEDICINE

## 2018-11-05 PROCEDURE — A9575 INJ GADOTERATE MEGLUMI 0.1ML: HCPCS | Performed by: FAMILY MEDICINE

## 2018-11-20 ENCOUNTER — OFFICE VISIT (OUTPATIENT)
Dept: RHEUMATOLOGY | Facility: CLINIC | Age: 48
End: 2018-11-20
Payer: MEDICAID

## 2018-11-20 VITALS
DIASTOLIC BLOOD PRESSURE: 69 MMHG | WEIGHT: 131 LBS | HEIGHT: 67 IN | BODY MASS INDEX: 20.56 KG/M2 | SYSTOLIC BLOOD PRESSURE: 118 MMHG | HEART RATE: 85 BPM

## 2018-11-20 DIAGNOSIS — Z51.81 THERAPEUTIC DRUG MONITORING: ICD-10-CM

## 2018-11-20 DIAGNOSIS — M34.9 SCLERODERMA (HCC): Primary | ICD-10-CM

## 2018-11-20 PROCEDURE — 99212 OFFICE O/P EST SF 10 MIN: CPT | Performed by: INTERNAL MEDICINE

## 2018-11-20 PROCEDURE — 99214 OFFICE O/P EST MOD 30 MIN: CPT | Performed by: INTERNAL MEDICINE

## 2018-11-20 RX ORDER — GABAPENTIN 300 MG/1
300 CAPSULE ORAL 3 TIMES DAILY
Qty: 90 CAPSULE | Refills: 1 | Status: SHIPPED | OUTPATIENT
Start: 2018-11-20 | End: 2019-05-01

## 2018-11-20 RX ORDER — HYDROXYCHLOROQUINE SULFATE 200 MG/1
200 TABLET, FILM COATED ORAL 2 TIMES DAILY
Qty: 60 TABLET | Refills: 1 | Status: SHIPPED | OUTPATIENT
Start: 2018-11-20 | End: 2019-05-01

## 2018-11-20 RX ORDER — LOSARTAN POTASSIUM 50 MG/1
50 TABLET ORAL DAILY
Qty: 30 TABLET | Refills: 1 | Status: SHIPPED | OUTPATIENT
Start: 2018-11-20 | End: 2019-01-14

## 2018-11-20 NOTE — PROGRESS NOTES
Ken Espinoza is a 52year old female who presents for Patient presents with:  Raynaud's Syndrome: difficulty going up and down the stairs  Leg Pain: numbness  Arm Pain  Finger Pain  Foot Pain  .    HPI:     I had the pleasure of seeing [employer] Eliceo fatigue. She's had this in the past before. She has passed out before - was taken to hospital about 2 years ago - high liver enzymes. [facility] She had a 2decho at [facility] - that was good.    She didn't have the PF month ago - she is on normal doses now. The rayanud's is a little bit there in her feet. She's doing ok. The tips of her fingers are getting more sore. She has small calcifications at tips of finger. s     4/30/2018  She was getting colds and flu in [place] there. It hurts to touch. Her feet feel swollen . She has more raynad's in her feet than before. Everything hurt.s   The gabapentin helped in the beginning . But it stopped working. No side effects. Not drowsy. The ibuprofen helps more.      Wt Readin Mother    • Other (osteoarthritis) Mother    • Cancer Daughter         lung   • Other (Other) Son    • Other (crr of liver) Son    • Other (RA) Maternal Grandmother    • Other (psoriasis) Paternal Grandfather    • Cancer Sister    • Cancer Sister    3 olde rhonchi  ABD: Soft Non tender, no HSM felt, BS positive  Joint exam:   She has skin tightness around mouth and hands and legs   SHE HAS SKIN TIGHTNESS IN LEGS -   NO SYNVOITIS -   sclerodactly with some capillary enhancemnt on left base of finger nails in AST (SGOT)      15 - 41 U/L 31   ALT (SGPT)      14 - 54 U/L 38   ALKALINE PHOSPHATASE      39 - 100 U/L 78   Total Bilirubin      0.1 - 2.0 mg/dL 1.1   TOTAL PROTEIN      6.4 - 8.2 g/dL 6.9   Albumin      3.1 - 4.5 g/dL 4.0   GLOBULIN, TOTAL      2.8 - comparison. PA pressure 28mmHg    5/14/2018 - PFTs complete  Spirometry, flow volume loop, lung volumes are all within normal   limits.    No evidence of restriction or obstruction.  \  There is mild increased RV / TLC ratio with normal TLC ,   suggestive increasing leg pain - may be increasing skin tightness - related to scleroderma -  - cont.  methotreate - cant increase b/c ofher oral ulcers - and joint pain   Cotn. ibuporfen 600mg a day as needed with food and omeprazole - shalonda for ankle pain  I prednison 600mg in evening. For hip pain  2. Cont. Methotrexate 8 tablets a week and  Cont.  folic acid 3 mg a day   3 . Increase  losasrtan  50mg a day for raynaud's   4. Cont. Prednisone 5mg a day   5. Return to clinci in 2 months. .  6. Check labs today    7.  h

## 2018-11-20 NOTE — PATIENT INSTRUCTIONS
1. Increase  gabapentin to  600mg at night and then in 1-2 weeks - 300mg in am and 600mg in evening. For hip pain  2. Cont. Methotrexate 8 tablets a week and  Cont.  folic acid 3 mg a day   3 . Increase  losasrtan  50mg a day for raynaud's   4. Cont.   Pre rhythm like chest pain; dizziness; fast or irregular heartbeat; palpitations; feeling faint or lightheaded, falls; breathing problems  · signs and symptoms of liver injury like dark yellow or brown urine; general ill feeling or flu-like symptoms; light-col Protect from moisture and light. Throw away any unused medicine after the expiration date. What should I tell my health care provider before I take this medicine?   They need to know if you have any of these conditions:  · diabetes  · eye disease, vision p

## 2018-11-26 ENCOUNTER — HOSPITAL ENCOUNTER (OUTPATIENT)
Dept: MAMMOGRAPHY | Age: 48
Discharge: HOME | End: 2018-11-26
Attending: FAMILY MEDICINE
Payer: MEDICAID

## 2018-11-26 DIAGNOSIS — Z12.31 ENCOUNTER FOR SCREENING MAMMOGRAM FOR MALIGNANT NEOPLASM OF BREAST: ICD-10-CM

## 2018-11-26 PROCEDURE — 77067 SCR MAMMO BI INCL CAD: CPT | Performed by: FAMILY MEDICINE

## 2018-11-27 ENCOUNTER — PATIENT MESSAGE (OUTPATIENT)
Dept: FAMILY MEDICINE CLINIC | Facility: CLINIC | Age: 48
End: 2018-11-27

## 2018-11-27 DIAGNOSIS — R92.8 ABNORMAL MAMMOGRAM OF LEFT BREAST: Primary | ICD-10-CM

## 2018-11-27 NOTE — TELEPHONE ENCOUNTER
From: Oneda Oppenheim  To: Heather Santizo MD  Sent: 11/27/2018 11:53 AM CST  Subject: Other    Hi Dr Herbert Parham. I received a letter about the Mammogram that i had yesturday .  In the letter it says that i have to have additional Mammographic views and or Ultra

## 2018-12-05 ENCOUNTER — OFFICE VISIT (OUTPATIENT)
Dept: NEUROLOGY | Facility: CLINIC | Age: 48
End: 2018-12-05
Payer: MEDICAID

## 2018-12-05 ENCOUNTER — APPOINTMENT (OUTPATIENT)
Dept: LAB | Age: 48
End: 2018-12-05
Attending: Other
Payer: MEDICAID

## 2018-12-05 VITALS
BODY MASS INDEX: 21 KG/M2 | WEIGHT: 133 LBS | SYSTOLIC BLOOD PRESSURE: 113 MMHG | RESPIRATION RATE: 12 BRPM | DIASTOLIC BLOOD PRESSURE: 60 MMHG | HEART RATE: 81 BPM

## 2018-12-05 DIAGNOSIS — R20.0 NUMBNESS IN FEET: ICD-10-CM

## 2018-12-05 DIAGNOSIS — R26.81 UNSTEADINESS ON FEET: Primary | ICD-10-CM

## 2018-12-05 DIAGNOSIS — R26.89 ANTALGIC GAIT: ICD-10-CM

## 2018-12-05 DIAGNOSIS — M34.9 SCLERODERMA (HCC): ICD-10-CM

## 2018-12-05 DIAGNOSIS — R26.81 UNSTEADINESS ON FEET: ICD-10-CM

## 2018-12-05 PROCEDURE — 36415 COLL VENOUS BLD VENIPUNCTURE: CPT

## 2018-12-05 PROCEDURE — 99244 OFF/OP CNSLTJ NEW/EST MOD 40: CPT | Performed by: OTHER

## 2018-12-05 PROCEDURE — 83516 IMMUNOASSAY NONANTIBODY: CPT

## 2018-12-05 PROCEDURE — 86235 NUCLEAR ANTIGEN ANTIBODY: CPT

## 2018-12-05 PROCEDURE — 82552 ASSAY OF CPK IN BLOOD: CPT

## 2018-12-05 PROCEDURE — 82550 ASSAY OF CK (CPK): CPT

## 2018-12-05 NOTE — PATIENT INSTRUCTIONS
Refill policies:    • Allow 2-3 business days for refills; controlled substances may take longer.   • Contact your pharmacy at least 5 days prior to running out of medication and have them send an electronic request or submit request through the “request re entire amount billed. Precertification and Prior Authorizations: If your physician has recommended that you have a procedure or additional testing performed.   [facility]) will contact your insurance carrier to obtain pre-certi should also send any requests electronically to the [place] office.

## 2018-12-11 ENCOUNTER — TELEPHONE (OUTPATIENT)
Dept: NEUROLOGY | Facility: CLINIC | Age: 48
End: 2018-12-11

## 2018-12-11 NOTE — TELEPHONE ENCOUNTER
Patient informed of below and questions answered.  Verbal read back given to discuss elevated results with patient's rheumatologist.

## 2018-12-11 NOTE — TELEPHONE ENCOUNTER
It is elevated, and appears was elevated a couple of years ago. I am not sure if this is a false positive/cross reactive marker for her, given the scleroderma. It can be elevated in autoimmune conditions and Sjogrens.  To decipher the significance of it, I

## 2018-12-14 ENCOUNTER — TELEPHONE (OUTPATIENT)
Dept: NEUROLOGY | Facility: CLINIC | Age: 48
End: 2018-12-14

## 2018-12-14 NOTE — TELEPHONE ENCOUNTER
Patient asking about results on TIKI blood test. Informed patient that results are WNL. Patient verbalizes understanding.

## 2018-12-16 ENCOUNTER — PATIENT MESSAGE (OUTPATIENT)
Dept: RHEUMATOLOGY | Facility: CLINIC | Age: 48
End: 2018-12-16

## 2018-12-16 DIAGNOSIS — M34.9 SCLERODERMA (HCC): ICD-10-CM

## 2018-12-17 ENCOUNTER — HOSPITAL ENCOUNTER (OUTPATIENT)
Dept: MAMMOGRAPHY | Age: 48
Discharge: HOME | End: 2018-12-17
Attending: FAMILY MEDICINE
Payer: MEDICAID

## 2018-12-17 ENCOUNTER — TELEPHONE (OUTPATIENT)
Dept: SURGERY | Facility: CLINIC | Age: 48
End: 2018-12-17

## 2018-12-17 DIAGNOSIS — R92.8 ABNORMAL MAMMOGRAM OF LEFT BREAST: ICD-10-CM

## 2018-12-17 PROCEDURE — 77065 DX MAMMO INCL CAD UNI: CPT | Performed by: FAMILY MEDICINE

## 2018-12-17 PROCEDURE — 77061 BREAST TOMOSYNTHESIS UNI: CPT | Performed by: FAMILY MEDICINE

## 2018-12-17 NOTE — TELEPHONE ENCOUNTER
Denial reason for [id number]    MRI is supported for this type of pain if one of the following applies. 1) patient fails to improve following recent (winin 3 months) 6 week trail of doctor prescribed treatment and or/observation , and the patient had follow up contact with the provider by visit, phone or messaging. 2) patient has severe weakness. 3) patient had tissue sample taken for lab testing (Biopsy) result that were normal.  4) patient had an infection. 5) patient has had cancer in the past.  6) patient has damage to a bundle of verve roots at the bottom of the spine. Records do not show that one or more of these apply to the patient .     With this insurance no peer to peer can be done

## 2018-12-17 NOTE — TELEPHONE ENCOUNTER
If MRI is being denied, then I need to evaluate her in 6 weeks from last appointment. EMG was the other recommended procedure, which she is reluctant to do, due to severe pain.

## 2018-12-17 NOTE — TELEPHONE ENCOUNTER
From: Steve Limon  To: Mariela Boyle MD  Sent: 12/16/2018 10:51 AM CST  Subject: Other    Hi Dr Morales [facility]. I need a refill of Methotrexate 2.5 mg. Thank You.

## 2018-12-18 NOTE — TELEPHONE ENCOUNTER
Patient called office back and discussed below options with patient. Informed patient that per Dr. Dhruv Emanuel, patient should follow up within the next 6 weeks and patient could have EMG to help determine next course of action. Patient refused EMG and states that she is unable to make appointment at this time. Encouraged patient to call ROBERTA back and schedule an appointment ASAP as schedule is filling. Patient VU and denies any further needs at this time.

## 2019-01-09 ENCOUNTER — TELEPHONE (OUTPATIENT)
Dept: NEUROLOGY | Facility: CLINIC | Age: 49
End: 2019-01-09

## 2019-01-09 NOTE — TELEPHONE ENCOUNTER
Patient calling regarding CTMGt message. MRI cervical was denied by insurance (see 12/17/18 TE). Advised follow up to discuss next steps with Dr Karena Eastman. Patient states she is unable to do this at this time. MRI cervical canceled.

## 2019-01-13 ENCOUNTER — PATIENT MESSAGE (OUTPATIENT)
Dept: RHEUMATOLOGY | Facility: CLINIC | Age: 49
End: 2019-01-13

## 2019-01-14 NOTE — TELEPHONE ENCOUNTER
Requested medication: Losartan Potassium 50 MG Oral Tab    LOV: 11/20/2018  No future appointments.   Labs:   Component      Latest Ref Rng & Units 10/22/2018   BUN      8 - 20 mg/dL 9   CREATININE      0.55 - 1.02 mg/dL 0.63   BUN/CREA RATIO      10.0 - 20

## 2019-01-14 NOTE — TELEPHONE ENCOUNTER
From: Ozzie Cash  To: Jarvis Torres MD  Sent: 1/13/2019 8:06 AM CST  Subject: Other    Hi Dr Nadine Pittman. I need a refill of Losartan 50 MG Tablets take one tablet by mouth daily . Blair Perez Thank you.

## 2019-01-16 RX ORDER — LOSARTAN POTASSIUM 50 MG/1
50 TABLET ORAL DAILY
Qty: 90 TABLET | Refills: 1 | Status: SHIPPED | OUTPATIENT
Start: 2019-01-16 | End: 2019-05-17

## 2019-02-12 ENCOUNTER — OFFICE VISIT (OUTPATIENT)
Dept: FAMILY MEDICINE CLINIC | Facility: CLINIC | Age: 49
End: 2019-02-12
Payer: MEDICAID

## 2019-02-12 VITALS
BODY MASS INDEX: 20.4 KG/M2 | WEIGHT: 130 LBS | SYSTOLIC BLOOD PRESSURE: 118 MMHG | DIASTOLIC BLOOD PRESSURE: 74 MMHG | HEART RATE: 78 BPM | HEIGHT: 67 IN | RESPIRATION RATE: 16 BRPM | TEMPERATURE: 98 F | OXYGEN SATURATION: 99 %

## 2019-02-12 DIAGNOSIS — R09.82 POST-NASAL DRIP: ICD-10-CM

## 2019-02-12 DIAGNOSIS — M34.9 SCLERODERMA (HCC): Primary | ICD-10-CM

## 2019-02-12 DIAGNOSIS — J40 BRONCHITIS: ICD-10-CM

## 2019-02-12 PROCEDURE — 99214 OFFICE O/P EST MOD 30 MIN: CPT | Performed by: FAMILY MEDICINE

## 2019-02-12 RX ORDER — AMOXICILLIN 875 MG/1
875 TABLET, COATED ORAL 2 TIMES DAILY
Qty: 14 TABLET | Refills: 0 | Status: SHIPPED | OUTPATIENT
Start: 2019-02-12 | End: 2019-05-01

## 2019-02-12 NOTE — PROGRESS NOTES
Chief Complaint:   Patient presents with:  Cough: Symptoms started Wednesday     HPI:   This is a 50year old female presenting with cold symptoms times 2 weeks.   Patient has a history of scleroderma currently on prednisone, methotrexate and hydroxychloroq Losartan Potassium 50 MG Oral Tab Take 1 tablet (50 mg total) by mouth daily. Disp: 90 tablet Rfl: 1   predniSONE 2.5 MG Oral Tab Take 1 tablet (2.5 mg total) by mouth 2 (two) times daily.  Disp: 60 tablet Rfl: 2   methotrexate 2.5 MG Oral Tab Take 8 tabl vomiting, abdominal pain, diarrhea, blood in stool and abdominal distention. Endocrine: Negative for cold intolerance, heat intolerance, polydipsia, polyphagia and polyuria.    Genitourinary: Negative for dysuria, hematuria, flank pain and difficulty urin present. Cardiovascular: Normal rate, regular rhythm, normal heart sounds and intact distal pulses. Exam reveals no gallop and no friction rub. No murmur heard. Edema not present. Carotid bruit not present.   Pulmonary/Chest: Effort normal and breath

## 2019-02-28 ENCOUNTER — APPOINTMENT (OUTPATIENT)
Dept: GENERAL RADIOLOGY | Age: 49
End: 2019-02-28
Attending: EMERGENCY MEDICINE
Payer: MEDICAID

## 2019-02-28 ENCOUNTER — HOSPITAL ENCOUNTER (EMERGENCY)
Age: 49
Discharge: HOME | End: 2019-02-28
Attending: EMERGENCY MEDICINE
Payer: MEDICAID

## 2019-02-28 VITALS
HEART RATE: 60 BPM | RESPIRATION RATE: 16 BRPM | DIASTOLIC BLOOD PRESSURE: 68 MMHG | WEIGHT: 127 LBS | TEMPERATURE: 100 F | BODY MASS INDEX: 18.81 KG/M2 | SYSTOLIC BLOOD PRESSURE: 107 MMHG | HEIGHT: 69 IN | OXYGEN SATURATION: 100 %

## 2019-02-28 DIAGNOSIS — M89.9 LYTIC BONE LESION OF FEMUR: ICD-10-CM

## 2019-02-28 DIAGNOSIS — S82.54XA NONDISPLACED FRACTURE OF MEDIAL MALLEOLUS OF RIGHT TIBIA, INITIAL ENCOUNTER FOR CLOSED FRACTURE: Primary | ICD-10-CM

## 2019-02-28 PROCEDURE — 99284 EMERGENCY DEPT VISIT MOD MDM: CPT

## 2019-02-28 PROCEDURE — 73552 X-RAY EXAM OF FEMUR 2/>: CPT | Performed by: EMERGENCY MEDICINE

## 2019-02-28 PROCEDURE — 72170 X-RAY EXAM OF PELVIS: CPT | Performed by: EMERGENCY MEDICINE

## 2019-02-28 PROCEDURE — 73610 X-RAY EXAM OF ANKLE: CPT | Performed by: EMERGENCY MEDICINE

## 2019-02-28 PROCEDURE — 29515 APPLICATION SHORT LEG SPLINT: CPT

## 2019-02-28 PROCEDURE — 73630 X-RAY EXAM OF FOOT: CPT | Performed by: EMERGENCY MEDICINE

## 2019-02-28 RX ORDER — HYDROCODONE BITARTRATE AND ACETAMINOPHEN 5; 325 MG/1; MG/1
1 TABLET ORAL ONCE
Status: COMPLETED | OUTPATIENT
Start: 2019-02-28 | End: 2019-02-28

## 2019-02-28 NOTE — ED PROVIDER NOTES
Patient Seen in: [facility] [place]    History   Patient presents with:  Lower Extremity Injury (musculoskeletal)    Stated Complaint: rt foot injury after fall this am    HPI    The patient is a 72-year-old female complaining of r sensation proximally and distally throughout all 4 extremities. HEENT: Atraumatic exam.    Supple neck. No pain with flexion, extension, rotation. Cardiovascular: Peripheral color is normal.  DP pulses 2+ bilaterally.   Lungs: Speaking full sentences fracture. Lucent lesion with sclerotic rim re-identified in the     right femoral neck. SOFT TISSUES:  Coarse calcification is seen lateral to the proximal left     femur. EFFUSION:   None visible.     OTHER:  Negative.         =====    CONCLUSION: PROCEDURE:  XR FEMUR MIN 2 VIEWS RIGHT (CPT=73552)         TECHNIQUE:  AP and lateral views were obtained. COMPARISON:  None.          INDICATIONS:  rt foot injury after fall this am         PATIENT STATED HISTORY: (As transcribed by Technologist) durations. FINDINGS:      BONES:  No displaced fracture. Degenerate changes including spurring is     seen throughout the right foot most pronounced involving the tarsal bones. SOFT TISSUES:  Negative. No visible soft tissue swelling. orthopedics. I reviewed the results with her, she and her family feel comfortable this plan, she is discharged home in stable condition.               Disposition and Plan     Clinical Impression:  Nondisplaced fracture of medial malleolus of right tibia,

## 2019-03-04 ENCOUNTER — APPOINTMENT (OUTPATIENT)
Dept: LAB | Facility: HOSPITAL | Age: 49
End: 2019-03-04
Attending: INTERNAL MEDICINE
Payer: MEDICAID

## 2019-03-04 ENCOUNTER — OFFICE VISIT (OUTPATIENT)
Dept: RHEUMATOLOGY | Facility: CLINIC | Age: 49
End: 2019-03-04
Payer: MEDICAID

## 2019-03-04 VITALS
BODY MASS INDEX: 20 KG/M2 | HEIGHT: 67 IN | SYSTOLIC BLOOD PRESSURE: 105 MMHG | DIASTOLIC BLOOD PRESSURE: 67 MMHG | HEART RATE: 73 BPM

## 2019-03-04 DIAGNOSIS — Z51.81 THERAPEUTIC DRUG MONITORING: ICD-10-CM

## 2019-03-04 DIAGNOSIS — M34.9 SCLERODERMA (HCC): ICD-10-CM

## 2019-03-04 DIAGNOSIS — M34.9 SCLERODERMA (HCC): Primary | ICD-10-CM

## 2019-03-04 LAB
ALT SERPL-CCNC: 32 U/L (ref 13–56)
AST SERPL-CCNC: 24 U/L (ref 15–37)
CREAT BLD-MCNC: 0.56 MG/DL (ref 0.55–1.02)
CRP SERPL-MCNC: <0.29 MG/DL (ref ?–0.3)
DEPRECATED RDW RBC AUTO: 60.5 FL (ref 35.1–46.3)
ERYTHROCYTE [DISTWIDTH] IN BLOOD BY AUTOMATED COUNT: 15.5 % (ref 11–15)
ERYTHROCYTE [SEDIMENTATION RATE] IN BLOOD: 12 MM/HR (ref 0–20)
HCT VFR BLD AUTO: 35.4 % (ref 35–48)
HGB BLD-MCNC: 11.5 G/DL (ref 12–16)
MCH RBC QN AUTO: 34.5 PG (ref 26–34)
MCHC RBC AUTO-ENTMCNC: 32.5 G/DL (ref 31–37)
MCV RBC AUTO: 106.3 FL (ref 80–100)
PLATELET # BLD AUTO: 217 10(3)UL (ref 150–450)
RBC # BLD AUTO: 3.33 X10(6)UL (ref 3.8–5.3)
WBC # BLD AUTO: 3.8 X10(3) UL (ref 4–11)

## 2019-03-04 PROCEDURE — 85652 RBC SED RATE AUTOMATED: CPT

## 2019-03-04 PROCEDURE — 99214 OFFICE O/P EST MOD 30 MIN: CPT | Performed by: INTERNAL MEDICINE

## 2019-03-04 PROCEDURE — 84460 ALANINE AMINO (ALT) (SGPT): CPT

## 2019-03-04 PROCEDURE — 86140 C-REACTIVE PROTEIN: CPT

## 2019-03-04 PROCEDURE — 85027 COMPLETE CBC AUTOMATED: CPT

## 2019-03-04 PROCEDURE — 36415 COLL VENOUS BLD VENIPUNCTURE: CPT

## 2019-03-04 PROCEDURE — 99212 OFFICE O/P EST SF 10 MIN: CPT | Performed by: INTERNAL MEDICINE

## 2019-03-04 PROCEDURE — 82565 ASSAY OF CREATININE: CPT

## 2019-03-04 PROCEDURE — 84450 TRANSFERASE (AST) (SGOT): CPT

## 2019-03-04 RX ORDER — IBUPROFEN 400 MG/1
400 TABLET ORAL EVERY 8 HOURS PRN
Qty: 90 TABLET | Refills: 3 | Status: SHIPPED | OUTPATIENT
Start: 2019-03-04

## 2019-03-04 RX ORDER — ESOMEPRAZOLE MAGNESIUM 40 MG/1
CAPSULE, DELAYED RELEASE ORAL
COMMUNITY
Start: 2011-02-11 | End: 2019-05-01

## 2019-03-04 NOTE — PATIENT INSTRUCTIONS
1. Cont. Methotrexate 8 tablets a week and  Cont.  folic acid 3 mg a day   2. Add ibuporfen 400mg twice a day - temporarily bc of right ankle injury   3 . Cont.  losasrtan  50mg a day for raynaud's   4. Cont. Prednisone 5mg a day   5.  Return to clin in

## 2019-03-04 NOTE — PROGRESS NOTES
Ozzie Cash is a 50year old female who presents for Patient presents with:  CREST Scleroderma  Raynaud's Syndrome  Joint Pain: hands, legs, toes, jaw, neck, arms   .    HPI:     I had the pleasure of seeing Ozzie Cash on 3/27/2017 for evalua the past before. She has passed out before - was taken to hospital about 2 years ago - high liver enzymes. She had labs in [facility]. She had a 2decho at [place] - that was good. She didn't have the PFT done recently.      She ge doses now. The rayanud's is a little bit there in her feet. She's doing ok. The tips of her fingers are getting more sore. She has small calcifications at tips of finger. s     4/30/2018  She was getting colds and flu in [place].  She had flare up of mouth Her feet feel swollen . She has more raynad's in her feet than before. Everything hurt.s   The gabapentin helped in the beginning . But it stopped working. No side effects. Not drowsy. The ibuprofen helps more.      3/4/2019  She was coming into work capsule by mouth daily. Disp:  Rfl:    Cholecalciferol (VITAMIN D3) 400 units Oral Tab Take by mouth. Disp:  Rfl:    ibuprofen 600 MG Oral Tab Take 600 mg by mouth every 6 (six) hours as needed for Pain.  Disp:  Rfl:    omeprazole 20 MG Oral Capsule Delayed mouth, gets Raynaud's - gets sores on her fingers - , no nasal ulcers, no parotid swelling, no neck pain, no jaw pain, no temple pain  Eyes: No visual changes,   CVS: No chest pain, no heart disease  RS: No SOB, no Cough, No Pleurtic pain,   GI: No nausea, 36.1 (H)   MCHC      31.0 - 37.0 g/dL 32.6   RDW      11.5 - 16.0 % 14.7   RDW-SD      35.1 - 46.3 fL 59.8 (H)   Prelim Neutrophil Abs      1.30 - 6.70 x10 (3) uL 4.23   Neutrophils Absolute      1.30 - 6.70 x10(3) uL 4.23   Lymphocytes Absolute      0.90 MUTATION          C282Y HEMOCHROMATOSIS MUTATION          S65C HEMOCHROMATOSIS MUTATION          HFE PCR SPECIMEN          HEMOCHROMATOSIS MUTA INTERPRET          Iron, Serum      28 - 170 ug/dL 233 (H)   TRANSFERRIN      200 - 360 mg/dL 220   Iron Bind. Ca 28mmHg    5/14/2018 - PFTs complete  Spirometry, flow volume loop, lung volumes are all within normal   limits.    No evidence of restriction or obstruction.  \  There is mild increased RV / TLC ratio with normal TLC ,   suggestive of air trapping     The d increasing leg pain - may be increasing skin tightness - related to scleroderma -  - cont.  methotreate - cant increase b/c ofher oral ulcers - and joint pain   Cotn. ibuporfen 600mg a day as needed with food and omeprazole - shalonda for ankle pain  I prednison 2. Add ibuporfen 400mg twice a day - temporarily bc of right ankle injury   3 . Cont.  losasrtan  50mg a day for raynaud's   4. Cont. Prednisone 5mg a day   5. Return to clinci in 3 months. .  6.  Check labs today    7. hydroxychlroquine 200mg twice a d

## 2019-03-11 ENCOUNTER — OFFICE VISIT (OUTPATIENT)
Dept: ORTHOPEDICS CLINIC | Facility: CLINIC | Age: 49
End: 2019-03-11
Payer: MEDICAID

## 2019-03-11 DIAGNOSIS — S93.491A SPRAIN OF ANTERIOR TALOFIBULAR LIGAMENT OF RIGHT ANKLE, INITIAL ENCOUNTER: Primary | ICD-10-CM

## 2019-03-11 PROCEDURE — 99212 OFFICE O/P EST SF 10 MIN: CPT | Performed by: ORTHOPAEDIC SURGERY

## 2019-03-11 PROCEDURE — L4386 NON-PNEUM WALK BOOT PRE CST: HCPCS | Performed by: ORTHOPAEDIC SURGERY

## 2019-03-11 PROCEDURE — 99213 OFFICE O/P EST LOW 20 MIN: CPT | Performed by: ORTHOPAEDIC SURGERY

## 2019-03-11 NOTE — PROGRESS NOTES
NURSING INTAKE COMMENTS: Patient presents with:  Consult: Pt fell at [facility] on Feb 28. Workers comp Finallly seen at [address] [place] [address]. Pt is splinted and ace wrap. Pt using a walker at home.  Pain scale 6-7  Fall  Fracture      HPI: This 50year old female prese Disp: 20 tablet Rfl: 2   TraMADol HCl 50 MG Oral Tab Take 1 tablet (50 mg total) by mouth 3 (three) times daily as needed for Pain. Disp: 15 tablet Rfl: 0   Acidophilus/Pectin Oral Cap Take 1 capsule by mouth daily.  Disp:  Rfl:    Cholecalciferol (VITAMIN connections:        Talks on phone: Not on file        Gets together: Not on file        Attends Congregational service: Not on file        Active member of club or organization: Not on file        Attends meetings of clubs or organizations: Not on file all orders for this visit:    Sprain of anterior talofibular ligament of right ankle, initial encounter        Rest, ice, elevation. Cam boot immobilization. Follow-up in 2 weeks. Weightbearing as tolerated in the boot.

## 2019-04-01 ENCOUNTER — OFFICE VISIT (OUTPATIENT)
Dept: ORTHOPEDICS CLINIC | Facility: CLINIC | Age: 49
End: 2019-04-01
Payer: MEDICAID

## 2019-04-01 DIAGNOSIS — S93.491A SPRAIN OF ANTERIOR TALOFIBULAR LIGAMENT OF RIGHT ANKLE, INITIAL ENCOUNTER: Primary | ICD-10-CM

## 2019-04-01 PROCEDURE — 99212 OFFICE O/P EST SF 10 MIN: CPT | Performed by: ORTHOPAEDIC SURGERY

## 2019-04-01 PROCEDURE — 99213 OFFICE O/P EST LOW 20 MIN: CPT | Performed by: ORTHOPAEDIC SURGERY

## 2019-04-01 NOTE — PROGRESS NOTES
NURSING INTAKE COMMENTS: Patient presents with: Ankle Pain: Pt is her for a follow up on right ankle. Pt in  tall cam walker. Pain level 5-8. Taking ibuprofen for pain. Some swelling to the top of the foot.        HPI: This 50year old female presents toda Disp: 90 tablet Rfl: 11   Ondansetron HCl (ZOFRAN) 4 mg tablet Take 1 tablet (4 mg total) by mouth every 8 (eight) hours as needed for Nausea.  Disp: 20 tablet Rfl: 2   TraMADol HCl 50 MG Oral Tab Take 1 tablet (50 mg total) by mouth 3 (three) times daily a Relationships      Social connections:        Talks on phone: Not on file        Gets together: Not on file        Attends Alevism service: Not on file        Active member of club or organization: Not on file        Attends meetings of clubs or [place] of right ankle, initial encounter  -     PHYSICAL THERAPY - INTERNAL      Patient would benefit from formal physical therapy. She can wean out of the boot. Continue with ice and elevation and rest.  Off work for another month.   I am concerned with her sc

## 2019-04-03 ENCOUNTER — APPOINTMENT (OUTPATIENT)
Dept: PHYSICAL THERAPY | Age: 49
End: 2019-04-03
Attending: ORTHOPAEDIC SURGERY
Payer: OTHER MISCELLANEOUS

## 2019-04-09 ENCOUNTER — OFFICE VISIT (OUTPATIENT)
Dept: PHYSICAL THERAPY | Age: 49
End: 2019-04-09
Attending: ORTHOPAEDIC SURGERY
Payer: MEDICAID

## 2019-04-09 DIAGNOSIS — S93.491A SPRAIN OF ANTERIOR TALOFIBULAR LIGAMENT OF RIGHT ANKLE, INITIAL ENCOUNTER: ICD-10-CM

## 2019-04-09 PROCEDURE — 97163 PT EVAL HIGH COMPLEX 45 MIN: CPT

## 2019-04-09 PROCEDURE — 97530 THERAPEUTIC ACTIVITIES: CPT

## 2019-04-09 NOTE — PROGRESS NOTES
LOWER EXTREMITY EVALUATION:   Referring Physician: Dr. Cyrus Ortega  Diagnosis: Sprain of anterior talofibular ligament of right ankle  Date of Service: 4/9/2019     PATIENT Aakash Crews is a 50year old y/o female who presents to therapy to Therapy to address the above impairments to progress gait pattern, improve mobility at the ankle, and lessen pain with gait. The patient's intake paperwork was filled out by her mother.       Precautions:  Fall Risk  OBJECTIVE:   Observation: The sheryl them independently. 3. Patient will have an increase in talocrual mobility to near normal in order to return to ambulation, sustained stance, and stair negotiation.     4. Patient will have an increase in foot and ankle strength to assist with returning

## 2019-04-12 ENCOUNTER — OFFICE VISIT (OUTPATIENT)
Dept: PHYSICAL THERAPY | Age: 49
End: 2019-04-12
Attending: ORTHOPAEDIC SURGERY
Payer: MEDICAID

## 2019-04-12 PROCEDURE — 97110 THERAPEUTIC EXERCISES: CPT

## 2019-04-12 PROCEDURE — 97140 MANUAL THERAPY 1/> REGIONS: CPT

## 2019-04-12 PROCEDURE — 97530 THERAPEUTIC ACTIVITIES: CPT

## 2019-04-12 NOTE — PROGRESS NOTES
Dx: Right Anterior Talofibural Ligament Sprain         Authorized # of Visits:  6 (Medicaid)          Next MD visit: none scheduled  Fall Risk: standard         Precautions: n/a             Subjective: States that the foot and ankle is sore.   States that s Patient will demonstrate an increase in MLT of ankle musculature in order to return to sustained ambulation and work related tasks. Plan: Assess response and progress as tolerated. Charges: TherEx 2 (25 min); Manual PT 1 (10 min);  TherAct 1 (10 m

## 2019-04-16 ENCOUNTER — PATIENT MESSAGE (OUTPATIENT)
Dept: FAMILY MEDICINE CLINIC | Facility: CLINIC | Age: 49
End: 2019-04-16

## 2019-04-16 ENCOUNTER — OFFICE VISIT (OUTPATIENT)
Dept: PHYSICAL THERAPY | Age: 49
End: 2019-04-16
Attending: ORTHOPAEDIC SURGERY
Payer: MEDICAID

## 2019-04-16 PROCEDURE — 97110 THERAPEUTIC EXERCISES: CPT

## 2019-04-16 PROCEDURE — 97140 MANUAL THERAPY 1/> REGIONS: CPT

## 2019-04-16 PROCEDURE — 97530 THERAPEUTIC ACTIVITIES: CPT

## 2019-04-16 NOTE — PROGRESS NOTES
Dx: Right Anterior Talofibural Ligament Sprain         Authorized # of Visits:  6 (Medicaid)          Next MD visit: none scheduled  Fall Risk: standard         Precautions: n/a             Subjective: States that the foot and ankle soreness is up to a 6/1 Shuttle L2 x 4 minutes for Bilateral Squats; L1 for single leg squats         AROM for ankle dorsiflexion/platarflexion and toe flexion and extension.           Manual PT (10 min) Manual PT (10 min)        PROM to the right ankle, 1st MTP, and phalanx 2-5 P

## 2019-04-17 NOTE — TELEPHONE ENCOUNTER
From: Katina Nash  To: Scarlett Peralta MD  Sent: 4/16/2019 10:59 AM CDT  Subject: Other    Hi Dr Jagdeep Ellsworth. Do i need to see you for Hypertension ?

## 2019-04-19 ENCOUNTER — TELEPHONE (OUTPATIENT)
Dept: FAMILY MEDICINE CLINIC | Facility: CLINIC | Age: 49
End: 2019-04-19

## 2019-04-19 ENCOUNTER — OFFICE VISIT (OUTPATIENT)
Dept: PHYSICAL THERAPY | Age: 49
End: 2019-04-19
Attending: ORTHOPAEDIC SURGERY
Payer: MEDICAID

## 2019-04-19 PROCEDURE — 97110 THERAPEUTIC EXERCISES: CPT

## 2019-04-19 PROCEDURE — 97140 MANUAL THERAPY 1/> REGIONS: CPT

## 2019-04-19 PROCEDURE — 97530 THERAPEUTIC ACTIVITIES: CPT

## 2019-04-19 NOTE — PROGRESS NOTES
Dx: Right Anterior Talofibural Ligament Sprain         Authorized # of Visits:  6 (Medicaid)          Next MD visit: 4/29/19  Fall Risk: standard         Precautions: n/a             Subjective: States that the foot and ankle pain is at a \"5.5/10. \"  Stat (10 min) Therapeutic Activity (10 min       Education of Central Sensitization and to DC the brace as much as possible at home. Discussed the importance of staying active and taking breaks when pain levels become too much.    Education on progression of g

## 2019-04-23 ENCOUNTER — OFFICE VISIT (OUTPATIENT)
Dept: PHYSICAL THERAPY | Age: 49
End: 2019-04-23
Attending: ORTHOPAEDIC SURGERY
Payer: MEDICAID

## 2019-04-23 PROCEDURE — 97140 MANUAL THERAPY 1/> REGIONS: CPT

## 2019-04-23 PROCEDURE — 97112 NEUROMUSCULAR REEDUCATION: CPT

## 2019-04-23 PROCEDURE — 97110 THERAPEUTIC EXERCISES: CPT

## 2019-04-23 NOTE — PROGRESS NOTES
Dx: Right Anterior Talofibural Ligament Sprain         Authorized # of Visits:  6 (Medicaid)          Next MD visit: 4/29/19  Fall Risk: standard         Precautions: n/a             Subjective: States that the foot is more painful than usual.  States that dorsiflexion/platarflexion and toe flexion and extension. Step up and overs 4\" x 15 right leg ascend, left leg descend. Step up and overs 4\" x 15 right leg ascend, left leg descend.           Seated circular wobble board x 20 Seated circular wob and progress as tolerated. Charges: TherEx 2 (25 min); Manual PT 1 (10 min);  TherAct 1 (10 min)       Total Timed Treatment: 45 min  Total Treatment Time: 45 min

## 2019-04-26 ENCOUNTER — OFFICE VISIT (OUTPATIENT)
Dept: PHYSICAL THERAPY | Age: 49
End: 2019-04-26
Attending: ORTHOPAEDIC SURGERY
Payer: MEDICAID

## 2019-04-26 PROCEDURE — 97110 THERAPEUTIC EXERCISES: CPT

## 2019-04-26 PROCEDURE — 97140 MANUAL THERAPY 1/> REGIONS: CPT

## 2019-04-26 PROCEDURE — 97112 NEUROMUSCULAR REEDUCATION: CPT

## 2019-04-26 NOTE — PROGRESS NOTES
Dx: Right Anterior Talofibural Ligament Sprain         Authorized # of Visits:  7 (Medicaid)          Next MD visit: 4/29/19  Fall Risk: standard         Precautions: n/a             Subjective: States that the foot continues to be painful.   Indicates that for single leg squats Shuttle L3 x 4 minutes for Bilateral Squats; L1 for single leg squats HOLD      AROM for ankle dorsiflexion/platarflexion and toe flexion and extension. AROM for ankle dorsiflexion/platarflexion and toe flexion and extension.    AROM toes, with a painful endfeel. Unwilling to actively move the foot and ankle because of expected pain. Suspecting central sensitization. Goals (12 visits):    1. Increase FOTO assessment > 11% from [facility] to DC.   2. Patient will be aware of postural limi

## 2019-04-30 ENCOUNTER — OFFICE VISIT (OUTPATIENT)
Dept: PHYSICAL THERAPY | Age: 49
End: 2019-04-30
Attending: ORTHOPAEDIC SURGERY
Payer: MEDICAID

## 2019-04-30 PROCEDURE — 97110 THERAPEUTIC EXERCISES: CPT

## 2019-04-30 PROCEDURE — 97140 MANUAL THERAPY 1/> REGIONS: CPT

## 2019-04-30 PROCEDURE — 97530 THERAPEUTIC ACTIVITIES: CPT

## 2019-04-30 NOTE — PROGRESS NOTES
Dx: Right Anterior Talofibural Ligament Sprain         Authorized # of Visits:  7 (Medicaid)          Next MD visit: 4/29/19  Fall Risk: standard         Precautions: n/a             Subjective: States that the foot continues to be painful.   Indicates that doorway x 20    Wobble Board AP Wobble Board AP Wobble Board AP x 20 Wobble Board AP x 20 HOLD HOLD    Shuttle L4 x 4 minutes for Bilateral Squats; L1 for single leg squats Shuttle L2 x 4 minutes for Bilateral Squats; L1 for single leg squats Shuttle L3 x Education of Central Sensitization and to DC the brace as much as possible at home. Discussed the importance of staying active and taking breaks when pain levels become too much. Education on progression of gait pattern.    Discussed therapeutic neuro progress has plateaued. Charges: TherEx 2 (25 min); Manual PT 1 (10 min);  TherAct 1 (10 min)       Total Timed Treatment: 45 min  Total Treatment Time: 45 min

## 2019-05-01 ENCOUNTER — OFFICE VISIT (OUTPATIENT)
Dept: ORTHOPEDICS CLINIC | Facility: CLINIC | Age: 49
End: 2019-05-01
Payer: OTHER MISCELLANEOUS

## 2019-05-01 DIAGNOSIS — S93.491A SPRAIN OF ANTERIOR TALOFIBULAR LIGAMENT OF RIGHT ANKLE, INITIAL ENCOUNTER: Primary | ICD-10-CM

## 2019-05-01 PROCEDURE — 99213 OFFICE O/P EST LOW 20 MIN: CPT | Performed by: ORTHOPAEDIC SURGERY

## 2019-05-01 PROCEDURE — 99212 OFFICE O/P EST SF 10 MIN: CPT | Performed by: ORTHOPAEDIC SURGERY

## 2019-05-01 NOTE — PROGRESS NOTES
Time based billing: total face-to-face time spent examining, counseling and treating this patient 15 minutes; more than 50% of time spent in counseling/coordination of care    Patient presents for follow-up.   Unfortunately therapy has not been going well f

## 2019-05-05 ENCOUNTER — PATIENT MESSAGE (OUTPATIENT)
Dept: RHEUMATOLOGY | Facility: CLINIC | Age: 49
End: 2019-05-05

## 2019-05-05 DIAGNOSIS — M34.9 SCLERODERMA (HCC): ICD-10-CM

## 2019-05-06 ENCOUNTER — APPOINTMENT (OUTPATIENT)
Dept: LAB | Age: 49
End: 2019-05-06
Attending: FAMILY MEDICINE
Payer: MEDICAID

## 2019-05-06 DIAGNOSIS — E55.9 VITAMIN D DEFICIENCY: ICD-10-CM

## 2019-05-06 PROCEDURE — 82306 VITAMIN D 25 HYDROXY: CPT

## 2019-05-06 PROCEDURE — 36415 COLL VENOUS BLD VENIPUNCTURE: CPT

## 2019-05-06 RX ORDER — FOLIC ACID 1 MG/1
3 TABLET ORAL DAILY
Qty: 90 TABLET | Refills: 11 | Status: SHIPPED | OUTPATIENT
Start: 2019-05-06 | End: 2020-04-17

## 2019-05-06 NOTE — TELEPHONE ENCOUNTER
From: Oneda Oppenheim  To: Haider Bentley MD  Sent: 5/5/2019 11:32 AM CDT  Subject: Other    Hi Dr Sav Arambula. I need a refill of Folic Acid 1 mg tablets . Take 3 tablets daily. Thank you

## 2019-05-06 NOTE — TELEPHONE ENCOUNTER
LOV: 3/4/19  Future Appointments   Date Time Provider Kim Bulli   5/13/2019 11:00 AM Bin Plata MD 2014 [facility]   5/17/2019  8:40 AM Manpreet Trinh MD [facility] 17 [facility]     Labs:   Component      Latest Ref Rng & Units 3/4/2019   WB

## 2019-05-13 ENCOUNTER — OFFICE VISIT (OUTPATIENT)
Dept: RHEUMATOLOGY | Facility: CLINIC | Age: 49
End: 2019-05-13
Payer: MEDICAID

## 2019-05-13 VITALS
BODY MASS INDEX: 19.46 KG/M2 | HEART RATE: 75 BPM | HEIGHT: 67 IN | WEIGHT: 124 LBS | SYSTOLIC BLOOD PRESSURE: 103 MMHG | DIASTOLIC BLOOD PRESSURE: 65 MMHG

## 2019-05-13 DIAGNOSIS — M34.9 SCLERODERMA (HCC): Primary | ICD-10-CM

## 2019-05-13 DIAGNOSIS — Z51.81 THERAPEUTIC DRUG MONITORING: ICD-10-CM

## 2019-05-13 PROCEDURE — 99212 OFFICE O/P EST SF 10 MIN: CPT | Performed by: INTERNAL MEDICINE

## 2019-05-13 PROCEDURE — 99214 OFFICE O/P EST MOD 30 MIN: CPT | Performed by: INTERNAL MEDICINE

## 2019-05-13 NOTE — PROGRESS NOTES
Hannah Lozano is a 50year old female who presents for Patient presents with:  Raynaud's Syndrome: Scleroderma  Toe Pain: heel  Ankle Pain: right  . HPI:     I had the pleasure of seeing Hannah Lozano on 3/27/2017 for evaluation.      She had a has passed out before - was taken to hospital about 2 years ago - high liver enzymes. She had labs in [facility] at [facility]. She had a 2decho at [facility] - that was good. She didn't have the PFT done recently.      She gets joint pains in her f rayanud's is a little bit there in her feet. She's doing ok. The tips of her fingers are getting more sore. She has small calcifications at tips of finger. s     4/30/2018  She was getting colds and flu in Winter. She had flare up of mouth ulcers.  She went swollen . She has more raynad's in her feet than before. Everything hurt.s   The gabapentin helped in the beginning . But it stopped working. No side effects. Not drowsy. The ibuprofen helps more.      3/4/2019  She was coming into work - she fell on a Medications:  folic acid 1 MG Oral Tab Take 3 tablets (3 mg total) by mouth daily. Disp: 90 tablet Rfl: 11   predniSONE 2.5 MG Oral Tab Take 1 tablet (2.5 mg total) by mouth 2 (two) times daily.  (Patient taking differently: Take 2.5 mg by mouth daily.  ) D Systems:  Fatigue  Constitutional:No fever, no change in weight or appetitie - stable right now,   Derm: No rashes, intermittent oral ulcers, no alopecia, no photosensitivity, no psoriasis  HEENT:  dry eyes,  Tip of tongue has had dry mouth, gets Raynaud's SSA AUTOAB      0 - 99 AU/mL 454 (H)   SSB AUTOAB      0 - 99 AU/mL <100   Sm AUTOAB (Stein)      0 - 99 AU/mL <100   RNP AUTOAB      0 - 99 AU/mL <100   Scl-70 AUTOAB      0 - 99 AU/mL 107 (H)   DEDRICK-1 AUTOAB      0 - 99 AU/mL <100   dsDNA AUTOAB      0 - all within normal   limits.    No evidence of restriction or obstruction.  \  There is mild increased RV / TLC ratio with normal TLC ,   suggestive of air trapping     The diffusing capacity is normal    Compared to the previous PFT dated 8.6.14 Tierney Person a-   Cotn. ibuporfen 600mg a day as needed with food and omeprazole - shalonda for ankle pain  (10mg has helped her oral ulcer before)   Ct angio of hcest done in 5/2017 - with ER visit for plerusiy - showed no ILD   Due for 2dech oin 5/2020 -   - hcq - had hiv .  6. Check labs in July - then every 3 months.        Karen Gonzalez MD  5/13/2019   11:20 AM  - Reviewed [place]- [place]  through Epic

## 2019-05-13 NOTE — PATIENT INSTRUCTIONS
1. Cont. Methotrexate 8 tablets a week and  Cont.  folic acid 3 mg a day   2. Cont. ibuporfen 400mg twice a day - temporarily bc of right ankle injury   3 . Cont.  losasrtan  50mg a day for raynaud's   4. Cont. Prednisone 5mg a day   5.  Return to [facility]

## 2019-05-17 ENCOUNTER — PATIENT MESSAGE (OUTPATIENT)
Dept: RHEUMATOLOGY | Facility: CLINIC | Age: 49
End: 2019-05-17

## 2019-05-17 ENCOUNTER — OFFICE VISIT (OUTPATIENT)
Dept: FAMILY MEDICINE CLINIC | Facility: CLINIC | Age: 49
End: 2019-05-17
Payer: MEDICAID

## 2019-05-17 VITALS
RESPIRATION RATE: 16 BRPM | DIASTOLIC BLOOD PRESSURE: 70 MMHG | HEART RATE: 76 BPM | TEMPERATURE: 98 F | SYSTOLIC BLOOD PRESSURE: 108 MMHG

## 2019-05-17 DIAGNOSIS — S82.891D CLOSED FRACTURE OF RIGHT ANKLE WITH ROUTINE HEALING, SUBSEQUENT ENCOUNTER: Primary | ICD-10-CM

## 2019-05-17 DIAGNOSIS — M34.9 SCLERODERMA (HCC): ICD-10-CM

## 2019-05-17 DIAGNOSIS — M79.604 PAIN IN BOTH LOWER EXTREMITIES: ICD-10-CM

## 2019-05-17 DIAGNOSIS — M79.605 PAIN IN BOTH LOWER EXTREMITIES: ICD-10-CM

## 2019-05-17 PROCEDURE — 99214 OFFICE O/P EST MOD 30 MIN: CPT | Performed by: FAMILY MEDICINE

## 2019-05-17 RX ORDER — LOSARTAN POTASSIUM 50 MG/1
50 TABLET ORAL DAILY
Qty: 90 TABLET | Refills: 1 | Status: SHIPPED | OUTPATIENT
Start: 2019-05-17 | End: 2019-09-16

## 2019-05-17 NOTE — PROGRESS NOTES
Chief Complaint:   Patient presents with:  Lab Results    HPI:   This is a 50year old female presenting for follow up, recently had right ankle fracture and currently on post op boot, injury about 3-4 weeks ago.    Patient has a history of scleroderma curr Allergies:    Gabapentin              HIVES  Plaquenil [Hydroxyc*    HIVES  Clindamycin             OTHER (SEE COMMENTS)    Comment:States she got c-diff after taking this             antibiotic.   Zithromax [Azithrom*    FEVER  Current Meds:    Current intolerance, polydipsia, polyphagia and polyuria. Genitourinary: Negative for dysuria, hematuria, flank pain and difficulty urinating. Musculoskeletal: Negative for joint pain, gait problem, neck pain and neck stiffness.    Skin: Negative for color mishra bruit not present. Pulmonary/Chest: Effort normal and breath sounds normal. No stridor. No respiratory distress. She has no wheezes. She has no rales. She exhibits no tenderness. Decreased aeration of bases. Abdominal: Soft.  Bowel sounds are normal.

## 2019-05-17 NOTE — TELEPHONE ENCOUNTER
LOV: 5/13/19  Future Appointments   Date Time Provider Kim Luci   8/12/2019 10:00 AM Willem Miller MD 2014 [facility]   9/30/2019  8:40 AM Zarina Lim MD EMG 17 [facility]G [facility]     Labs:   Component      Latest Ref Rng & Units 3/4/2019   W

## 2019-05-17 NOTE — TELEPHONE ENCOUNTER
From: Jaxson Hawk  To: Josefa Cantu MD  Sent: 5/17/2019 10:59 AM CDT  Subject: Other    Hi Dr Kelechi Wheatley . I need a refill of Methotrexate 2.5 Mg Tablets . Thank You.

## 2019-06-17 ENCOUNTER — OFFICE VISIT (OUTPATIENT)
Dept: ORTHOPEDICS CLINIC | Facility: CLINIC | Age: 49
End: 2019-06-17
Payer: MEDICAID

## 2019-06-17 DIAGNOSIS — S93.491A SPRAIN OF ANTERIOR TALOFIBULAR LIGAMENT OF RIGHT ANKLE, INITIAL ENCOUNTER: Primary | ICD-10-CM

## 2019-06-17 PROCEDURE — 99212 OFFICE O/P EST SF 10 MIN: CPT | Performed by: ORTHOPAEDIC SURGERY

## 2019-06-17 PROCEDURE — 99213 OFFICE O/P EST LOW 20 MIN: CPT | Performed by: ORTHOPAEDIC SURGERY

## 2019-06-17 NOTE — PROGRESS NOTES
Time based billing: total face-to-face time spent examining, counseling and treating this patient 15 minutes; more than 50% of time spent in counseling/coordination of care    Patient presents for follow-up.   She continues to have significant pain and swel

## 2019-07-03 ENCOUNTER — PATIENT MESSAGE (OUTPATIENT)
Dept: RHEUMATOLOGY | Facility: CLINIC | Age: 49
End: 2019-07-03

## 2019-07-05 RX ORDER — PREDNISONE 2.5 MG
2.5 TABLET ORAL 2 TIMES DAILY
Qty: 60 TABLET | Refills: 2 | Status: SHIPPED | OUTPATIENT
Start: 2019-07-05 | End: 2019-09-26

## 2019-07-05 NOTE — TELEPHONE ENCOUNTER
Requested refill: prednisone 2.5mg    LOV: 5/13/2019  Future Appointments   Date Time Provider Kim Verma   8/12/2019 10:00 AM Bertin Garrett MD 2014 [facility]     Labs:   Component      Latest Ref Rng & Units 3/4/2019   WBC      4.0 - 11. 0

## 2019-07-05 NOTE — TELEPHONE ENCOUNTER
From: Ozzie Cash  To: Jarvis Torres MD  Sent: 7/3/2019 6:22 PM CDT  Subject: Other    Hi Dr Nadine Pittman. I need a refill of Prednisone 2.5 mg tablets take one tablet by mouth twice daily. .... Thank You.

## 2019-07-18 ENCOUNTER — TELEPHONE (OUTPATIENT)
Dept: RHEUMATOLOGY | Facility: CLINIC | Age: 49
End: 2019-07-18

## 2019-07-18 ENCOUNTER — PATIENT MESSAGE (OUTPATIENT)
Dept: RHEUMATOLOGY | Facility: CLINIC | Age: 49
End: 2019-07-18

## 2019-07-18 RX ORDER — PREDNISONE 10 MG/1
10 TABLET ORAL DAILY
Qty: 30 TABLET | Refills: 0 | Status: SHIPPED | OUTPATIENT
Start: 2019-07-18 | End: 2019-08-12

## 2019-07-18 NOTE — TELEPHONE ENCOUNTER
From: Brit Blank  To: Matthew Mancilla MD  Sent: 7/18/2019 7:59 AM CDT  Subject: Other    Hi Dr Mariposa Rogers. I have a few sores in my mouth that are painful. Could i please have Prednisone 10mg tablets. .. Parminder Schwartz  Thank You

## 2019-07-18 NOTE — TELEPHONE ENCOUNTER
Advised that office can confirm script was sent on this patient with  12/15/1970. [facility] stated she was told their is another person in the office's system with the same name and address.   Advised office could confirm different identification of perso

## 2019-07-18 NOTE — TELEPHONE ENCOUNTER
[facility] or Linh Corona - From [place] calling to clarify information with doctor's office. She stated they have received a Refill Order from office with the patients info on script showing a   12/15/1970.     But their information in the pharmacy profil

## 2019-07-18 NOTE — TELEPHONE ENCOUNTER
Please see pt's message below and advise.      Requested medication: prednisone 10mg  Last filled: 3/29/2019 (#30 w/ 0 refills)    LOV: 5/12/2019  Future Appointments   Date Time Provider Kim Verma   8/12/2019 10:00 AM Haider Bentley MD [facility]

## 2019-07-19 ENCOUNTER — APPOINTMENT (OUTPATIENT)
Dept: LAB | Age: 49
End: 2019-07-19
Attending: INTERNAL MEDICINE
Payer: MEDICAID

## 2019-07-19 ENCOUNTER — PATIENT MESSAGE (OUTPATIENT)
Dept: RHEUMATOLOGY | Facility: CLINIC | Age: 49
End: 2019-07-19

## 2019-07-19 DIAGNOSIS — M34.9 SCLERODERMA (HCC): ICD-10-CM

## 2019-07-19 DIAGNOSIS — Z51.81 THERAPEUTIC DRUG MONITORING: ICD-10-CM

## 2019-07-19 LAB
ALT SERPL-CCNC: 38 U/L (ref 13–56)
AST SERPL-CCNC: 38 U/L (ref 15–37)
CREAT BLD-MCNC: 0.65 MG/DL (ref 0.55–1.02)
CRP SERPL-MCNC: <0.29 MG/DL (ref ?–0.3)
DEPRECATED RDW RBC AUTO: 50.8 FL (ref 35.1–46.3)
ERYTHROCYTE [DISTWIDTH] IN BLOOD BY AUTOMATED COUNT: 12.9 % (ref 11–15)
HCT VFR BLD AUTO: 36.4 % (ref 35–48)
HGB BLD-MCNC: 11.8 G/DL (ref 12–16)
MCH RBC QN AUTO: 35.1 PG (ref 26–34)
MCHC RBC AUTO-ENTMCNC: 32.4 G/DL (ref 31–37)
MCV RBC AUTO: 108.3 FL (ref 80–100)
PLATELET # BLD AUTO: 173 10(3)UL (ref 150–450)
RBC # BLD AUTO: 3.36 X10(6)UL (ref 3.8–5.3)
SED RATE-ML: 5 MM/HR (ref 0–25)
WBC # BLD AUTO: 3.6 X10(3) UL (ref 4–11)

## 2019-07-19 PROCEDURE — 85027 COMPLETE CBC AUTOMATED: CPT

## 2019-07-19 PROCEDURE — 84460 ALANINE AMINO (ALT) (SGPT): CPT

## 2019-07-19 PROCEDURE — 36415 COLL VENOUS BLD VENIPUNCTURE: CPT

## 2019-07-19 PROCEDURE — 86140 C-REACTIVE PROTEIN: CPT

## 2019-07-19 PROCEDURE — 84450 TRANSFERASE (AST) (SGOT): CPT

## 2019-07-19 PROCEDURE — 82565 ASSAY OF CREATININE: CPT

## 2019-07-19 PROCEDURE — 85652 RBC SED RATE AUTOMATED: CPT

## 2019-07-19 NOTE — TELEPHONE ENCOUNTER
From: Yasmin Michael  To: Jann Chu MD  Sent: 7/19/2019 6:18 AM CDT  Subject: Other    Hi Dr Belinda Marquez. I a few sores in my mouth that are painful. Can i please have Prednisone 10mg tablets. Thank You.

## 2019-07-19 NOTE — TELEPHONE ENCOUNTER
Pt contacted and advised office sent script yesterday. Advised see call pharmacy to resolve identify issue. See 7/18/19 encounter.

## 2019-08-12 ENCOUNTER — OFFICE VISIT (OUTPATIENT)
Dept: RHEUMATOLOGY | Facility: CLINIC | Age: 49
End: 2019-08-12
Payer: MEDICAID

## 2019-08-12 VITALS
BODY MASS INDEX: 20.88 KG/M2 | DIASTOLIC BLOOD PRESSURE: 71 MMHG | HEIGHT: 67 IN | WEIGHT: 133 LBS | SYSTOLIC BLOOD PRESSURE: 122 MMHG | HEART RATE: 94 BPM

## 2019-08-12 DIAGNOSIS — M35.00 SJOGREN'S SYNDROME, WITH UNSPECIFIED ORGAN INVOLVEMENT (HCC): ICD-10-CM

## 2019-08-12 DIAGNOSIS — M34.9 SCLERODERMA (HCC): Primary | ICD-10-CM

## 2019-08-12 DIAGNOSIS — Z51.81 THERAPEUTIC DRUG MONITORING: ICD-10-CM

## 2019-08-12 DIAGNOSIS — R20.0 NUMBNESS AND TINGLING OF BOTH LOWER EXTREMITIES: ICD-10-CM

## 2019-08-12 DIAGNOSIS — R20.2 NUMBNESS AND TINGLING OF BOTH LOWER EXTREMITIES: ICD-10-CM

## 2019-08-12 PROCEDURE — 99214 OFFICE O/P EST MOD 30 MIN: CPT | Performed by: INTERNAL MEDICINE

## 2019-08-12 RX ORDER — PREDNISONE 10 MG/1
10 TABLET ORAL DAILY
Qty: 30 TABLET | Refills: 0 | Status: SHIPPED | OUTPATIENT
Start: 2019-08-12 | End: 2019-11-13

## 2019-08-12 NOTE — PROGRESS NOTES
Stephen Contreras is a 50year old female who presents for Patient presents with: Follow - Up: Labs completed 7/19  Raynaud's Syndrome  Joint Pain: ankles,hip,feet, legs, hands, back pain  .    HPI:     I had the pleasure of seeing Stephen Contreras on 3 She's had this in the past before. She has passed out before - was taken to hospital about 2 years ago - high liver enzymes. She had labs in [facility] at [facility] [place] [facility] [place]. She had a 2decho at [facility] - that was good.    She didn't have the PFT done re she is on normal doses now. The rayanud's is a little bit there in her feet. She's doing ok. The tips of her fingers are getting more sore. She has small calcifications at tips of finger. s     4/30/2018  She was getting colds and flu in Winter.  She had f hurts to touch. Her feet feel swollen . She has more raynad's in her feet than before. Everything hurt.s   The gabapentin helped in the beginning . But it stopped working. No side effects. Not drowsy. The ibuprofen helps more.      3/4/2019  She was c she has Sjogrens' sympotms. sunday has dry mouth - she's had this for years. She gets sore starting on her tongue. She has had SSA since 2014 and 2018 - repeatedly - c/w overlap of sjogrens' as well.    She has some weight loss as well in the past - she Date   • Esophageal reflux    • Scleroderma ([facility] [address]    • Scleroderma ([facility] [address]       Past Surgical History:   Procedure Laterality Date   • HYSTERECTOMY      AGE 35   • ARIANA BIOPSY STEREOTACTIC NODULE 1 SITE RIGHT  4/2015     Benign   • OOPHORECTOMY      AGE 35 Sitting, Cuff Size: adult)   Pulse 94   Ht 5' 7\" (1.702 m)   Wt 133 lb (60.3 kg)   LMP 01/21/2013   BMI 20.83 kg/m²   HEENT: Clear oropharynx, no oral ulcers, EOM intact, clear sclear, PERRLA, pleasant, no acute distress, no CAD, no neck tendnerness, good Count      150.0 - 450.0 10(3)uL  217.0   MCV      80.0 - 100.0 fL  106.3 (H)   MCH      26.0 - 34.0 pg  34.5 (H)   MCHC      31.0 - 37.0 g/dL  32.5   RDW      11.0 - 15.0 %  15.5 (H)   RDW-SD      35.1 - 46.3 fL  60.5 (H)   CREATININE      0.55 - 1.02 mg/ 1/2016 -     7/10/2017 - left foot xray   CONCLUSION:      DJD first metatarsophalangeal joint. Small erosion proximal medial corner proximal phalanx. Mild tissue swelling. Mild bunion. No hallux valgus. No acute fracture seen.  Bones overall appear osteope prednisone 5m g gio a- she had been on 2.5mg a day   Cotn. ibuporfen 600mg a day as needed with food and omeprazole - shalonda for ankle pain  (10mg has helped her oral ulcer before)   Ct angio of hcest done in 5/2017 - with ER visit for plerusiy - showed no IL twice a day -   3 . Cont.  losasrtan  50mg a day for raynaud's   4. Cont. Prednisone 5mg a day   5. Return to [facility] in 3-4 months. - f/u in 1/2020 -   6.  check labs every 3 months -   7 follow up with neurologist   - prednisoen 10mg sent in for new oral

## 2019-08-12 NOTE — PATIENT INSTRUCTIONS
1. Cont. Methotrexate 8 tablets a week and  Cont.  folic acid 3 mg a day   2. Cont. ibuporfen 400mg twice a day -   3 . Cont.  losasrtan  50mg a day for raynaud's   4. Cont. Prednisone 5mg a day   5. Return to [facility] in 3-4 months. - f/u in 1/2020 -   6.

## 2019-08-13 ENCOUNTER — PATIENT MESSAGE (OUTPATIENT)
Dept: RHEUMATOLOGY | Facility: CLINIC | Age: 49
End: 2019-08-13

## 2019-08-13 NOTE — TELEPHONE ENCOUNTER
From: Will Walsh  To: Shani Patel MD  Sent: 8/13/2019 8:45 AM CDT  Subject: Other    Hi Dr Maria R Pritchett. I just wanted to know if there is going to be standing refills for my medications in the system when you are on leave . ...... Mi Khalil Thank You

## 2019-09-13 ENCOUNTER — PATIENT MESSAGE (OUTPATIENT)
Dept: RHEUMATOLOGY | Facility: CLINIC | Age: 49
End: 2019-09-13

## 2019-09-16 RX ORDER — LOSARTAN POTASSIUM 50 MG/1
50 TABLET ORAL DAILY
Qty: 90 TABLET | Refills: 1 | Status: SHIPPED | OUTPATIENT
Start: 2019-09-16 | End: 2020-03-10

## 2019-09-16 NOTE — TELEPHONE ENCOUNTER
LOV: 8/12/19  Future Appointments   Date Time Provider Kim Verma   9/30/2019  8:40 AM Melinda Hernandez MD EMG 17 [facility]G [place]   1/13/2020 10:00 AM Jennifer Kumar MD [address] [place] [address]     Labs:   Component      Latest Ref Rng & Units 7/19/2019

## 2019-09-16 NOTE — TELEPHONE ENCOUNTER
From: Bry Alert  To: Zeke Pena MD  Sent: 9/13/2019 7:34 AM CDT  Subject: Other    Hi Dr Ekaterina Page . I need a refill of LOSARTAN 50 MG Tablets take one tablet by mouth daily. .. [place] Suh Thank You.

## 2019-09-26 ENCOUNTER — PATIENT MESSAGE (OUTPATIENT)
Dept: RHEUMATOLOGY | Facility: CLINIC | Age: 49
End: 2019-09-26

## 2019-09-26 RX ORDER — PREDNISONE 2.5 MG
2.5 TABLET ORAL 2 TIMES DAILY
Qty: 60 TABLET | Refills: 2 | Status: SHIPPED | OUTPATIENT
Start: 2019-09-26 | End: 2019-12-23

## 2019-09-26 NOTE — TELEPHONE ENCOUNTER
LOV: 8/12/2019  Future Appointments   Date Time Provider Kim Verma   9/30/2019  8:40 AM Kacy Morton MD EMG 17 [facility] [place]   1/13/2020 10:00 AM Matthew Mancilla MD [address] [place] [address]     Labs:   Component      Latest Ref Rng & Units 7/19/2019

## 2019-09-26 NOTE — TELEPHONE ENCOUNTER
From: Millie Thornton  To: Santosh Shelton MD  Sent: 9/26/2019 8:50 AM CDT  Subject: Other    Hi Dr Cynthia Canchola. I will need a refill of Predinsone 2.5 mg tablets take 1 tablet by mouth twice a day. Ehsan Rowe Thank you.

## 2019-09-28 ENCOUNTER — TELEPHONE (OUTPATIENT)
Dept: FAMILY MEDICINE CLINIC | Facility: CLINIC | Age: 49
End: 2019-09-28

## 2019-09-28 NOTE — TELEPHONE ENCOUNTER
HALLIEM to reschedule Monday's appt. Asked her to leave us a message or call us back during regular hours on Monday.

## 2019-10-11 ENCOUNTER — PATIENT MESSAGE (OUTPATIENT)
Dept: RHEUMATOLOGY | Facility: CLINIC | Age: 49
End: 2019-10-11

## 2019-10-11 DIAGNOSIS — M34.9 SCLERODERMA (HCC): ICD-10-CM

## 2019-10-11 NOTE — TELEPHONE ENCOUNTER
LOV: 8/12/19  Future Appointments   Date Time Provider Kim Verma   10/16/2019  9:00 AM Madeline Amaral MD EMG 17 [facility] [place]   1/13/2020 10:00 AM Tea Machado MD [address] [place] [address]     Labs:   Component      Latest Ref Rng & Units 7/19/2019

## 2019-10-11 NOTE — TELEPHONE ENCOUNTER
From: Zoila Hirsch  To: Domingo Stoddard MD  Sent: 10/11/2019 7:55 AM CDT  Subject: Other    Hi Dr Lili Ortega . I need a refill of Methotrexate 2.5 mg tablets . Take 8 tablets by mouth every 7 days. The tablets are yellow. Thank You.

## 2019-10-16 ENCOUNTER — OFFICE VISIT (OUTPATIENT)
Dept: FAMILY MEDICINE CLINIC | Facility: CLINIC | Age: 49
End: 2019-10-16
Payer: MEDICAID

## 2019-10-16 ENCOUNTER — LAB ENCOUNTER (OUTPATIENT)
Dept: LAB | Age: 49
End: 2019-10-16
Attending: FAMILY MEDICINE
Payer: MEDICAID

## 2019-10-16 VITALS
HEIGHT: 67 IN | RESPIRATION RATE: 16 BRPM | WEIGHT: 137 LBS | SYSTOLIC BLOOD PRESSURE: 110 MMHG | HEART RATE: 68 BPM | OXYGEN SATURATION: 97 % | TEMPERATURE: 98 F | BODY MASS INDEX: 21.5 KG/M2 | DIASTOLIC BLOOD PRESSURE: 70 MMHG

## 2019-10-16 DIAGNOSIS — R29.898 WEAKNESS OF BOTH LOWER EXTREMITIES: ICD-10-CM

## 2019-10-16 DIAGNOSIS — M79.604 PAIN IN BOTH LOWER EXTREMITIES: ICD-10-CM

## 2019-10-16 DIAGNOSIS — M34.9 SCLERODERMA (HCC): ICD-10-CM

## 2019-10-16 DIAGNOSIS — M79.605 PAIN IN BOTH LOWER EXTREMITIES: ICD-10-CM

## 2019-10-16 DIAGNOSIS — I73.00 RAYNAUD'S DISEASE WITHOUT GANGRENE: ICD-10-CM

## 2019-10-16 DIAGNOSIS — Z00.00 ROUTINE GENERAL MEDICAL EXAMINATION AT A HEALTH CARE FACILITY: ICD-10-CM

## 2019-10-16 DIAGNOSIS — K12.1 MOUTH ULCER: ICD-10-CM

## 2019-10-16 DIAGNOSIS — Z51.81 THERAPEUTIC DRUG MONITORING: ICD-10-CM

## 2019-10-16 DIAGNOSIS — J02.9 ACUTE PHARYNGITIS, UNSPECIFIED ETIOLOGY: ICD-10-CM

## 2019-10-16 DIAGNOSIS — Z00.00 ROUTINE GENERAL MEDICAL EXAMINATION AT A HEALTH CARE FACILITY: Primary | ICD-10-CM

## 2019-10-16 PROCEDURE — 85652 RBC SED RATE AUTOMATED: CPT

## 2019-10-16 PROCEDURE — 86140 C-REACTIVE PROTEIN: CPT

## 2019-10-16 PROCEDURE — 84443 ASSAY THYROID STIM HORMONE: CPT

## 2019-10-16 PROCEDURE — 80053 COMPREHEN METABOLIC PANEL: CPT

## 2019-10-16 PROCEDURE — 99396 PREV VISIT EST AGE 40-64: CPT | Performed by: FAMILY MEDICINE

## 2019-10-16 PROCEDURE — 85025 COMPLETE CBC W/AUTO DIFF WBC: CPT

## 2019-10-16 PROCEDURE — 36415 COLL VENOUS BLD VENIPUNCTURE: CPT

## 2019-10-16 PROCEDURE — 80061 LIPID PANEL: CPT

## 2019-10-16 RX ORDER — ESOMEPRAZOLE MAGNESIUM 40 MG/1
CAPSULE, DELAYED RELEASE ORAL
COMMUNITY
Start: 2011-02-11 | End: 2019-10-16

## 2019-10-16 RX ORDER — ACETAMINOPHEN 325 MG/1
TABLET ORAL
COMMUNITY
Start: 2014-08-18 | End: 2019-10-16

## 2019-10-17 ENCOUNTER — PATIENT MESSAGE (OUTPATIENT)
Dept: FAMILY MEDICINE CLINIC | Facility: CLINIC | Age: 49
End: 2019-10-17

## 2019-10-17 DIAGNOSIS — R42 VERTIGO: Primary | ICD-10-CM

## 2019-10-17 NOTE — PROGRESS NOTES
Chief Complaint:   Patient presents with:  Physical: blood work     HPI:   This is a 50year old female presenting for follow up and annual physical with lower extremity weakness which is getting worse.     Patient has a history of scleroderma currently on Other (RA) Maternal Grandmother    • Other (psoriasis) Paternal Grandfather    • Cancer Sister    • Cancer Sister      Allergies:    Gabapentin              HIVES  Plaquenil [Hydroxyc*    HIVES  Clindamycin             OTHER (SEE COMMENTS)    Comment:State and polyuria. Genitourinary: Negative for dysuria, hematuria, flank pain and difficulty urinating. Musculoskeletal: Negative for joint pain, gait problem, neck pain and neck stiffness. Skin: Negative for color change, pallor, rash and wound.    Allerg No murmur heard. Edema not present. Carotid bruit not present. Pulmonary/Chest: Effort normal and breath sounds normal. No stridor. No respiratory distress. She has no wheezes. She has no rales. She exhibits no tenderness.    Decreased aeration of bases CPM    7. Mouth ulcer  -supportive care for now    -discussion about FMLA or disability should be through Rheumatology as I am not prescribing medication for her symptoms. Follow up in 6 months, sooner as needed.

## 2019-10-18 NOTE — TELEPHONE ENCOUNTER
It does not sound like it is from the autoimmune disease could be signs of vertigo. Labs were stable.      I referred patient to neurology she needs to see neurology right away she could see Jolanta Majano from physical therapy for vertigo treatment

## 2019-10-24 ENCOUNTER — TELEPHONE (OUTPATIENT)
Dept: FAMILY MEDICINE CLINIC | Facility: CLINIC | Age: 49
End: 2019-10-24

## 2019-10-24 DIAGNOSIS — E87.6 HYPOKALEMIA: Primary | ICD-10-CM

## 2019-10-24 RX ORDER — POTASSIUM CHLORIDE 1500 MG/1
20 TABLET, FILM COATED, EXTENDED RELEASE ORAL DAILY
Qty: 90 TABLET | Refills: 0 | Status: SHIPPED | OUTPATIENT
Start: 2019-10-24 | End: 2019-11-20

## 2019-10-24 NOTE — TELEPHONE ENCOUNTER
----- Message from Romana Lopez MD sent at 10/18/2019 12:12 PM CDT -----  Patient's potassium was low should be on 20 mEq potassium until recheck in 3 months. Recheck CBC and BMP in 3 months    msg sent mychart.   Med sent to RX and labs in system

## 2019-10-25 ENCOUNTER — PATIENT MESSAGE (OUTPATIENT)
Dept: FAMILY MEDICINE CLINIC | Facility: CLINIC | Age: 49
End: 2019-10-25

## 2019-10-25 RX ORDER — POTASSIUM CHLORIDE 750 MG/1
20 TABLET, FILM COATED, EXTENDED RELEASE ORAL 2 TIMES DAILY
Qty: 180 TABLET | Refills: 0 | Status: SHIPPED | OUTPATIENT
Start: 2019-10-25 | End: 2020-01-19

## 2019-10-25 NOTE — TELEPHONE ENCOUNTER
From: Jack Hebert  To: Gely Gibbons MD  Sent: 10/25/2019 8:30 AM CDT  Subject: Other    Hi Dr Neto Ramirez. Can you please send a prior authorization because the 20meq is not covered with just a prescription. Kamar Walker Thank you so much.

## 2019-11-13 ENCOUNTER — PATIENT MESSAGE (OUTPATIENT)
Dept: RHEUMATOLOGY | Facility: CLINIC | Age: 49
End: 2019-11-13

## 2019-11-13 RX ORDER — PREDNISONE 10 MG/1
10 TABLET ORAL DAILY
Qty: 30 TABLET | Refills: 0 | Status: SHIPPED | OUTPATIENT
Start: 2019-11-13 | End: 2019-12-30

## 2019-11-13 NOTE — TELEPHONE ENCOUNTER
LOV: 8/12/19  Refills?   Future Appointments   Date Time Provider Kim Verma   11/20/2019 11:00 AM DO CLAUDIO Sauceda [facility] [place]   1/13/2020 10:00 AM Ben Paris MD 2014 [facility]     Labs:   Component      Latest Ref - 5.0 g/dL 3.6   Globulin      2.8 - 4.4 g/dL 2.7 (L)   A/G Ratio      1.0 - 2.0 1.3   Cholesterol, Total      <200 mg/dL 184   HDL Cholesterol      40 - 59 mg/dL 67 (H)   Triglycerides      30 - 149 mg/dL 54   LDL Cholesterol Calc      <100 mg/dL 106 (H)

## 2019-11-13 NOTE — TELEPHONE ENCOUNTER
From: Ozzie Cash  To: Fernanda Lu MD  Sent: 11/13/2019 1:37 PM CST  Subject: Other    Hi Dr Cristina Law. I need a refill of Prednisone 10mg take one tablet by mouth daily. ... Blair Perez Thank You.

## 2019-11-19 ENCOUNTER — PATIENT MESSAGE (OUTPATIENT)
Dept: FAMILY MEDICINE CLINIC | Facility: CLINIC | Age: 49
End: 2019-11-19

## 2019-11-19 DIAGNOSIS — Z12.31 ENCOUNTER FOR SCREENING MAMMOGRAM FOR MALIGNANT NEOPLASM OF BREAST: Primary | ICD-10-CM

## 2019-11-19 NOTE — TELEPHONE ENCOUNTER
From: Bc Suggs  To: Madison Jewell MD  Sent: 11/19/2019 8:54 AM CST  Subject: Other    Hi Dr Dexter Beal. I need a order for a Mammogram.. Omari Echeverria Thank You.

## 2019-11-20 ENCOUNTER — OFFICE VISIT (OUTPATIENT)
Dept: NEUROLOGY | Facility: CLINIC | Age: 49
End: 2019-11-20
Payer: MEDICAID

## 2019-11-20 VITALS
SYSTOLIC BLOOD PRESSURE: 112 MMHG | HEART RATE: 62 BPM | DIASTOLIC BLOOD PRESSURE: 80 MMHG | RESPIRATION RATE: 16 BRPM | WEIGHT: 139 LBS | BODY MASS INDEX: 22 KG/M2

## 2019-11-20 DIAGNOSIS — R20.0 NUMBNESS IN FEET: ICD-10-CM

## 2019-11-20 DIAGNOSIS — R42 VERTIGO: ICD-10-CM

## 2019-11-20 DIAGNOSIS — G43.109 MIGRAINE WITH AURA AND WITHOUT STATUS MIGRAINOSUS, NOT INTRACTABLE: ICD-10-CM

## 2019-11-20 DIAGNOSIS — M34.9 SCLERODERMA (HCC): Primary | ICD-10-CM

## 2019-11-20 DIAGNOSIS — R26.81 UNSTEADINESS ON FEET: ICD-10-CM

## 2019-11-20 PROCEDURE — 99214 OFFICE O/P EST MOD 30 MIN: CPT | Performed by: OTHER

## 2019-11-20 RX ORDER — NORTRIPTYLINE HYDROCHLORIDE 10 MG/1
CAPSULE ORAL
Qty: 60 CAPSULE | Refills: 1 | Status: SHIPPED | OUTPATIENT
Start: 2019-11-20 | End: 2020-01-29

## 2019-11-20 NOTE — PROGRESS NOTES
[id number]   Neurology- f/u    Yasmin Heap Patient Status:  No patient class for patient encounter    12/15/1970 MRN [id number]   Location Irma Lugo PCP Jr Mata MD              HPI - 4 % 0   CK-BB      0 - 0 % 0   CK TOTAL      20 - 180 U/L 100   CK-MACRO TYPE I      0 - 0 % 0   CK-MACRO TYPE II      0 - 0 % 0   SSA AUTOAB      <100 AU/mL 257 (H)   SSB AUTOAB      <100 AU/mL <100   GLUTAMIC ACID DECARBOXYLASE AB      0.0 - 5.0 IU/mL (CPT=19081)  4/2015     Benign   • OOPHORECTOMY      AGE 35       Family History:  family history includes Cancer in her daughter, father, sister, and sister;  Other in her mother and son; RA in her maternal grandmother; crr of liver in her son; Fausto Jenkins 11/20/19  1048   BP: 112/80   Pulse: 62   Resp: 16     General Appearance: Patient is a 50year old female in no acute distress  Cardiac: Normal rate & regular rhythm  Lungs: Clear to auscultation bilaterally  Skin: There are no rashes or other skin lesion vertigo  Discussed migraines  Recommend initiation of trial of nortriptyline, and agree with vestibular PT  Numbness in feet- progressed- MRI thoracic is again recommended    Requested Prescriptions      No prescriptions requested or ordered in this encoun

## 2019-11-20 NOTE — PATIENT INSTRUCTIONS
After your visit at the [facility] office  today,  please direct any follow up questions or medication needs to the staff in our  [place] office so that your concerns may be promptly addressed.   We are available through [employer] or at the numbers below: must be picked up in office. • Please allow the office 2-3 business days to fill the prescription. • Patient must present photo ID at time of . PLEASE NOTE: PRESCRIPTIONS MUST BE PICKED UP PRIOR TO 3:00PM MONDAY-FRIDAY    Scheduling Tests:     If submitting forms to office staff. • Form completion may require an additional fee. • A signed Release of Information (SERVANDO) must be on file before forms may be submitted. When dropping off forms, please ask the  for this paper.    • Failure

## 2019-11-20 NOTE — PROGRESS NOTES
Pt following up for vertigo. Pt states her balance has gotten worse since last visit. She also c/o floaters like glass in her left eye intermittently.

## 2019-11-25 ENCOUNTER — TELEPHONE (OUTPATIENT)
Dept: SURGERY | Facility: CLINIC | Age: 49
End: 2019-11-25

## 2019-11-25 DIAGNOSIS — R20.0 NUMBNESS IN FEET: Primary | ICD-10-CM

## 2019-11-25 NOTE — TELEPHONE ENCOUNTER
Patient notified of MRI denial and to call [facility] to schedule EMG. Patient verbalized understanding.

## 2019-11-25 NOTE — TELEPHONE ENCOUNTER
Pt called asking for update on MRI PA. I informed her that it was denied but that we had sent the denial to the provider and I would call her when I got Dr. Tiffany Coleman recommendations.

## 2019-11-25 NOTE — TELEPHONE ENCOUNTER
Denial reason for MRI thoracic - 76017:      Based on [employer] [facility] Guidelines Section: SP 4.1 Upper Back (Thoracic Spine) Pain without and with Neurological Features (Including Stenosis), we cannot approve this request.Your records show that you m

## 2019-11-25 NOTE — TELEPHONE ENCOUNTER
MLTCB on VM (ok per HIPAA consent) to relay that Dr Leopoldo Mccauley would like her to have EMG done. Dr Leopoldo Mccauley put order in.

## 2019-12-17 ENCOUNTER — HOSPITAL ENCOUNTER (OUTPATIENT)
Dept: MAMMOGRAPHY | Age: 49
Discharge: HOME | End: 2019-12-17
Attending: FAMILY MEDICINE
Payer: MEDICAID

## 2019-12-17 DIAGNOSIS — Z12.31 ENCOUNTER FOR SCREENING MAMMOGRAM FOR MALIGNANT NEOPLASM OF BREAST: ICD-10-CM

## 2019-12-17 PROCEDURE — 77067 SCR MAMMO BI INCL CAD: CPT | Performed by: FAMILY MEDICINE

## 2019-12-23 ENCOUNTER — PATIENT MESSAGE (OUTPATIENT)
Dept: RHEUMATOLOGY | Facility: CLINIC | Age: 49
End: 2019-12-23

## 2019-12-23 RX ORDER — PREDNISONE 2.5 MG
2.5 TABLET ORAL 2 TIMES DAILY
Qty: 60 TABLET | Refills: 2 | Status: SHIPPED | OUTPATIENT
Start: 2019-12-23 | End: 2020-03-18

## 2019-12-23 NOTE — TELEPHONE ENCOUNTER
LOV: 8/12/2019  Future Appointments   Date Time Provider Kim Veram   1/8/2020 10:20 AM DO CLAUDIO Ballesteros [facility] [place]   1/13/2020 10:00 AM Prateek Douglas MD 2014 [facility]   1/14/2020  9:45 AM Laine Hansen PT PF PT 8. 7   CALCULATED OSMOLALITY      275 - 295 mOsm/kg 290   eGFR NON-AFR.  AMERICAN      >=60 105   eGFR AFRICAN AMERICAN      >=60 121   AST (SGOT)      15 - 37 U/L 26   ALT (SGPT)      13 - 56 U/L 36   ALKALINE PHOSPHATASE      39 - 100 U/L 104 (H)   Total B

## 2019-12-23 NOTE — TELEPHONE ENCOUNTER
From: Millie Thornton  To: Edilia Houston MD  Sent: 12/23/2019 7:59 AM CST  Subject: Other    Hi Dr Jeffery Riley . I need a refill of PREDNISONE 2.5 MG tablets. Take one by mouth twice daily. .... Ehsan Rowe Thank You.

## 2019-12-28 ENCOUNTER — PATIENT MESSAGE (OUTPATIENT)
Dept: RHEUMATOLOGY | Facility: CLINIC | Age: 49
End: 2019-12-28

## 2019-12-30 ENCOUNTER — APPOINTMENT (OUTPATIENT)
Dept: PHYSICAL THERAPY | Age: 49
End: 2019-12-30
Attending: FAMILY MEDICINE
Payer: MEDICAID

## 2019-12-30 ENCOUNTER — PATIENT MESSAGE (OUTPATIENT)
Dept: RHEUMATOLOGY | Facility: CLINIC | Age: 49
End: 2019-12-30

## 2019-12-30 RX ORDER — PREDNISONE 10 MG/1
10 TABLET ORAL DAILY
Qty: 30 TABLET | Refills: 0 | Status: SHIPPED | OUTPATIENT
Start: 2019-12-30 | End: 2020-02-25

## 2019-12-30 NOTE — TELEPHONE ENCOUNTER
Please see pt's message below and advise; prednisone 10mg script pended.      LOV: 8/12/2019  Future Appointments   Date Time Provider Kim Verma   1/8/2020 10:20 AM DO CLAUDIO Frye [facility] [place]   1/13/2020 10:00 AM Aster Ramírez Gorman MD  8/12/2019   10:03 AM  - Reviewed [place]- information  through [address]

## 2019-12-30 NOTE — TELEPHONE ENCOUNTER
From: [facility]  To: Mart Ram MD  Sent: 12/30/2019 12:40 PM CST  Subject: Other    Hi Dr Subhash Rust. I called saundra to see if the Prednisone 10mg Tablets take one tablet every day was ready and it was not.  Was the prednisone 10mg Tablets s

## 2019-12-30 NOTE — TELEPHONE ENCOUNTER
From: Chi Montanez  To: Peggy Hernandez MD  Sent: 12/28/2019 6:45 PM CST  Subject: Other    Hi Dr Marah Mcintosh. I need a refill of 10 mg of prednisone . I have painful sores in my mouth. Take one tablet every day. Tiarra Medley Thank you.

## 2020-01-13 ENCOUNTER — LAB ENCOUNTER (OUTPATIENT)
Dept: LAB | Facility: HOSPITAL | Age: 50
End: 2020-01-13
Attending: INTERNAL MEDICINE
Payer: MEDICAID

## 2020-01-13 ENCOUNTER — OFFICE VISIT (OUTPATIENT)
Dept: RHEUMATOLOGY | Facility: CLINIC | Age: 50
End: 2020-01-13
Payer: MEDICAID

## 2020-01-13 VITALS
HEIGHT: 67 IN | RESPIRATION RATE: 16 BRPM | BODY MASS INDEX: 21.96 KG/M2 | WEIGHT: 139.88 LBS | HEART RATE: 102 BPM | DIASTOLIC BLOOD PRESSURE: 82 MMHG | SYSTOLIC BLOOD PRESSURE: 131 MMHG

## 2020-01-13 DIAGNOSIS — M25.551 RIGHT HIP PAIN: ICD-10-CM

## 2020-01-13 DIAGNOSIS — Z51.81 THERAPEUTIC DRUG MONITORING: ICD-10-CM

## 2020-01-13 DIAGNOSIS — M34.9 SCLERODERMA (HCC): Primary | ICD-10-CM

## 2020-01-13 DIAGNOSIS — M34.9 SCLERODERMA (HCC): ICD-10-CM

## 2020-01-13 DIAGNOSIS — E87.6 HYPOKALEMIA: ICD-10-CM

## 2020-01-13 DIAGNOSIS — M35.00 SJOGREN'S SYNDROME, WITH UNSPECIFIED ORGAN INVOLVEMENT (HCC): ICD-10-CM

## 2020-01-13 LAB
ALT SERPL-CCNC: 36 U/L (ref 13–56)
ANION GAP SERPL CALC-SCNC: 6 MMOL/L (ref 0–18)
AST SERPL-CCNC: 27 U/L (ref 15–37)
BASOPHILS # BLD AUTO: 0.04 X10(3) UL (ref 0–0.2)
BASOPHILS NFR BLD AUTO: 0.6 %
BUN BLD-MCNC: 10 MG/DL (ref 7–18)
BUN/CREAT SERPL: 14.9 (ref 10–20)
CALCIUM BLD-MCNC: 9.5 MG/DL (ref 8.5–10.1)
CHLORIDE SERPL-SCNC: 109 MMOL/L (ref 98–112)
CO2 SERPL-SCNC: 28 MMOL/L (ref 21–32)
CREAT BLD-MCNC: 0.67 MG/DL (ref 0.55–1.02)
CRP SERPL-MCNC: <0.29 MG/DL (ref ?–0.3)
DEPRECATED RDW RBC AUTO: 58.5 FL (ref 35.1–46.3)
EOSINOPHIL # BLD AUTO: 0.04 X10(3) UL (ref 0–0.7)
EOSINOPHIL NFR BLD AUTO: 0.6 %
ERYTHROCYTE [DISTWIDTH] IN BLOOD BY AUTOMATED COUNT: 14.6 % (ref 11–15)
ERYTHROCYTE [SEDIMENTATION RATE] IN BLOOD: 9 MM/HR (ref 0–20)
GLUCOSE BLD-MCNC: 79 MG/DL (ref 70–99)
HCT VFR BLD AUTO: 35.3 % (ref 35–48)
HGB BLD-MCNC: 11.6 G/DL (ref 12–16)
IMM GRANULOCYTES # BLD AUTO: 0.02 X10(3) UL (ref 0–1)
IMM GRANULOCYTES NFR BLD: 0.3 %
LYMPHOCYTES # BLD AUTO: 2.39 X10(3) UL (ref 1–4)
LYMPHOCYTES NFR BLD AUTO: 34.9 %
MCH RBC QN AUTO: 35.8 PG (ref 26–34)
MCHC RBC AUTO-ENTMCNC: 32.9 G/DL (ref 31–37)
MCV RBC AUTO: 109 FL (ref 80–100)
MONOCYTES # BLD AUTO: 0.49 X10(3) UL (ref 0.1–1)
MONOCYTES NFR BLD AUTO: 7.2 %
NEUTROPHILS # BLD AUTO: 3.86 X10 (3) UL (ref 1.5–7.7)
NEUTROPHILS # BLD AUTO: 3.86 X10(3) UL (ref 1.5–7.7)
NEUTROPHILS NFR BLD AUTO: 56.4 %
OSMOLALITY SERPL CALC.SUM OF ELEC: 294 MOSM/KG (ref 275–295)
PATIENT FASTING Y/N/NP: YES
PLATELET # BLD AUTO: 213 10(3)UL (ref 150–450)
POTASSIUM SERPL-SCNC: 3.4 MMOL/L (ref 3.5–5.1)
RBC # BLD AUTO: 3.24 X10(6)UL (ref 3.8–5.3)
SODIUM SERPL-SCNC: 143 MMOL/L (ref 136–145)
WBC # BLD AUTO: 6.8 X10(3) UL (ref 4–11)

## 2020-01-13 PROCEDURE — 85652 RBC SED RATE AUTOMATED: CPT

## 2020-01-13 PROCEDURE — 84450 TRANSFERASE (AST) (SGOT): CPT

## 2020-01-13 PROCEDURE — 80048 BASIC METABOLIC PNL TOTAL CA: CPT

## 2020-01-13 PROCEDURE — 84460 ALANINE AMINO (ALT) (SGPT): CPT

## 2020-01-13 PROCEDURE — 36415 COLL VENOUS BLD VENIPUNCTURE: CPT

## 2020-01-13 PROCEDURE — 85025 COMPLETE CBC W/AUTO DIFF WBC: CPT

## 2020-01-13 PROCEDURE — 86140 C-REACTIVE PROTEIN: CPT

## 2020-01-13 PROCEDURE — 99214 OFFICE O/P EST MOD 30 MIN: CPT | Performed by: INTERNAL MEDICINE

## 2020-01-13 RX ORDER — PREDNISONE 10 MG/1
10 TABLET ORAL DAILY
Qty: 15 TABLET | Refills: 0 | Status: SHIPPED | OUTPATIENT
Start: 2020-01-13 | End: 2020-05-29

## 2020-01-13 NOTE — PATIENT INSTRUCTIONS
1. Cont. Methotrexate 8 tablets a week and  Cont.  folic acid 3 mg a day   2. Cont. ibuporfen 400mg twice a day -   3 . Cont.  losasrtan  50mg a day for raynaud's   4. Cont.   Prednisone 5mg a day  - increase to 10mg x 1-2 weeks for ulcer , then taper back

## 2020-01-14 ENCOUNTER — OFFICE VISIT (OUTPATIENT)
Dept: PHYSICAL THERAPY | Age: 50
End: 2020-01-14
Attending: FAMILY MEDICINE
Payer: MEDICAID

## 2020-01-14 DIAGNOSIS — R42 VERTIGO: ICD-10-CM

## 2020-01-14 PROCEDURE — 97163 PT EVAL HIGH COMPLEX 45 MIN: CPT | Performed by: PHYSICAL THERAPIST

## 2020-01-14 PROCEDURE — 97116 GAIT TRAINING THERAPY: CPT | Performed by: PHYSICAL THERAPIST

## 2020-01-14 NOTE — PROGRESS NOTES
VESTIBULAR EVALUATION:   Referring Physician: Dr. Lester Harding  Diagnosis: Vertigo     Date of Service: 1/14/2020     PATIENT Latasha Kumar is a 52year old female who presents to therapy today with reports of feeling of swaying when standing bending forward to  things from the floor, performing sit to stand transfers, rolling in bed, needs assist currently for ADLs including commode transfers. Social history:  No smoking, drinking or recreational drugs.  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   PLAN OF CARE:    Goals: (to be met in 8 visits)  · Pt will be able to perform sit to stand transfers safely without sensation of swaying  · Pt will ambulate > 500' with RW with SBA on level surfaces to improve independence

## 2020-01-16 ENCOUNTER — OFFICE VISIT (OUTPATIENT)
Dept: PHYSICAL THERAPY | Age: 50
End: 2020-01-16
Attending: FAMILY MEDICINE
Payer: MEDICAID

## 2020-01-16 DIAGNOSIS — R42 VERTIGO: ICD-10-CM

## 2020-01-16 DIAGNOSIS — E87.6 HYPOKALEMIA: Primary | ICD-10-CM

## 2020-01-16 PROCEDURE — 97116 GAIT TRAINING THERAPY: CPT | Performed by: PHYSICAL THERAPIST

## 2020-01-16 PROCEDURE — 97110 THERAPEUTIC EXERCISES: CPT | Performed by: PHYSICAL THERAPIST

## 2020-01-16 NOTE — PROGRESS NOTES
Dx: Vertigo         Authorized # of Visits:  8 visits 1/15 20 - 4/14/20         Next MD visit: none scheduled  Fall Risk: Fall risk         Precautions: n/a             Subjective: Pt states she has not noticed any changes in symptoms since last visit.

## 2020-01-19 ENCOUNTER — PATIENT MESSAGE (OUTPATIENT)
Dept: FAMILY MEDICINE CLINIC | Facility: CLINIC | Age: 50
End: 2020-01-19

## 2020-01-19 RX ORDER — POTASSIUM CHLORIDE 750 MG/1
20 TABLET, FILM COATED, EXTENDED RELEASE ORAL 2 TIMES DAILY
Qty: 120 TABLET | Refills: 0 | Status: SHIPPED | OUTPATIENT
Start: 2020-01-19 | End: 2020-03-18

## 2020-01-19 NOTE — TELEPHONE ENCOUNTER
----- Message from Sumanth Peres sent at 1/19/2020  9:51 AM CST -----  Regarding: Other  Contact: [phone]  Hi Dr Wallace Jeans . I need a refill  of Potassium CL 10 MEQ ER Tablets. .. Guadalupe Riedel Guadalupe Riedel Thank You.

## 2020-01-21 ENCOUNTER — OFFICE VISIT (OUTPATIENT)
Dept: PHYSICAL THERAPY | Age: 50
End: 2020-01-21
Attending: FAMILY MEDICINE
Payer: MEDICAID

## 2020-01-21 DIAGNOSIS — R42 VERTIGO: ICD-10-CM

## 2020-01-21 PROCEDURE — 97112 NEUROMUSCULAR REEDUCATION: CPT | Performed by: PHYSICAL THERAPIST

## 2020-01-21 PROCEDURE — 97110 THERAPEUTIC EXERCISES: CPT | Performed by: PHYSICAL THERAPIST

## 2020-01-21 NOTE — PROGRESS NOTES
Dx: Vertigo         Authorized # of Visits:  8 visits 1/15 20 - 4/14/20         Next MD visit: none scheduled  Fall Risk: Fall risk         Precautions: n/a             Subjective: Pt states she tried to do her home exercises but it made her symptoms worst movement X 2 sets         MINS GAIT TRAINING X 5 MINS         Ambulating with RW with cues for safety and increasing stride length 200'         MINS                                          Charges: there ex X 2, neuro re ed X 1     Total Timed Treatment:

## 2020-01-23 ENCOUNTER — OFFICE VISIT (OUTPATIENT)
Dept: PHYSICAL THERAPY | Age: 50
End: 2020-01-23
Attending: FAMILY MEDICINE
Payer: MEDICAID

## 2020-01-23 DIAGNOSIS — R42 VERTIGO: ICD-10-CM

## 2020-01-23 PROCEDURE — 97112 NEUROMUSCULAR REEDUCATION: CPT | Performed by: PHYSICAL THERAPIST

## 2020-01-23 PROCEDURE — 97110 THERAPEUTIC EXERCISES: CPT | Performed by: PHYSICAL THERAPIST

## 2020-01-24 NOTE — PROGRESS NOTES
Dx: Vertigo         Authorized # of Visits:  8 visits 1/15 20 - 4/14/20         Next MD visit: none scheduled  Fall Risk: Fall risk         Precautions: n/a             Subjective: Pt continues to report unsteadiness and LE pain.     Objective: Romberg: EO sets NEURO RE ED 10 MINS VOR I seated X 5 sets        2 target gaze with head movement X 2 sets GAIT TRAINING X 5 MINS        MINS GAIT TRAINING X 5 MINS Ambulating with RW with cues for safety and increasing stride length 400'        Ambulating with RW wi

## 2020-01-27 ENCOUNTER — TELEPHONE (OUTPATIENT)
Dept: FAMILY MEDICINE CLINIC | Facility: CLINIC | Age: 50
End: 2020-01-27

## 2020-01-28 ENCOUNTER — PATIENT MESSAGE (OUTPATIENT)
Dept: NEUROLOGY | Facility: CLINIC | Age: 50
End: 2020-01-28

## 2020-01-28 ENCOUNTER — APPOINTMENT (OUTPATIENT)
Dept: PHYSICAL THERAPY | Age: 50
End: 2020-01-28
Attending: FAMILY MEDICINE
Payer: MEDICAID

## 2020-01-28 DIAGNOSIS — G43.109 MIGRAINE WITH AURA AND WITHOUT STATUS MIGRAINOSUS, NOT INTRACTABLE: Primary | ICD-10-CM

## 2020-01-29 NOTE — TELEPHONE ENCOUNTER
From: Katina Nash  To: Alexis Shipley DO  Sent: 1/28/2020 6:01 PM CST  Subject: Other    Hi Dr Gilda De León . Could i try 30mg of Nortriptyline at night. I have trouble taking a high dosage. I have acid reflux . Could i try 30 mg of Nortriptyline . I

## 2020-01-29 NOTE — TELEPHONE ENCOUNTER
Patient states she is tolerating 20mg Nortiptyline nightly and would like to try 30mg nightly. Patient states she had taken 50mg Nortriptyline in past but it increased her gastric reflux. Rx Nortriptyline 10mg #90 pended for MD review and signature.     Med

## 2020-01-30 ENCOUNTER — OFFICE VISIT (OUTPATIENT)
Dept: PHYSICAL THERAPY | Age: 50
End: 2020-01-30
Attending: FAMILY MEDICINE
Payer: MEDICAID

## 2020-01-30 DIAGNOSIS — R42 VERTIGO: ICD-10-CM

## 2020-01-30 PROCEDURE — 97110 THERAPEUTIC EXERCISES: CPT | Performed by: PHYSICAL THERAPIST

## 2020-01-30 RX ORDER — NORTRIPTYLINE HYDROCHLORIDE 10 MG/1
CAPSULE ORAL
Qty: 90 CAPSULE | Refills: 0 | Status: SHIPPED | OUTPATIENT
Start: 2020-01-30 | End: 2020-11-02

## 2020-01-30 NOTE — PROGRESS NOTES
Dx: Vertigo         Authorized # of Visits:  8 visits 1/15 20 - 4/14/20         Next MD visit: none scheduled  Fall Risk: Fall risk         Precautions: n/a           Discharge Summary  Subjective: Ms. Hemanth Li has attended 5 visits in [facility] assistance from her mother. NOT MET    Plan: Discharge from PT with HEP.   Date: 1/16/2020  TX#: 2/8 Date: 1/21/2020  TX#: 3/8   Date: 1/23/2020  TX#: 4/8 Date: 1/30/2020  TX#: 5/8 Date:  TX#: 6/ Date:  TX#: 7/ Date:  TX#: 8/   THERE EX 30  MINS THERE EX 30

## 2020-02-06 ENCOUNTER — PATIENT MESSAGE (OUTPATIENT)
Dept: RHEUMATOLOGY | Facility: CLINIC | Age: 50
End: 2020-02-06

## 2020-02-06 NOTE — TELEPHONE ENCOUNTER
August 9, 2019       Sean Pandya   [address] [facility] [address]  VIA In Basket      Patient: Hue Pink   YOB: 1936   Date of Visit: 8/9/2019       Dear Dr. So Recipients:    Thank you for referring Hue Pink to me for evaluation. Below are my notes for this visit with her.    If you have questions, please do not hesitate to call me. I look forward to following your patient along with you.      Sincerely,        Serjio Pardo MD        CC: No Recipients               From: Stephen Contreras  To: Duc Barron MD  Sent: 2/6/2020 10:50 AM CST  Subject: Other    Hi Dr Duc Barron . Did you get my message. .. James Robledo Thank you.

## 2020-02-17 ENCOUNTER — PATIENT MESSAGE (OUTPATIENT)
Dept: FAMILY MEDICINE CLINIC | Facility: CLINIC | Age: 50
End: 2020-02-17

## 2020-02-17 NOTE — TELEPHONE ENCOUNTER
Have you received any disability paperwork for patient. Nothing has been received in triage. Please advise. Thank you!

## 2020-02-17 NOTE — TELEPHONE ENCOUNTER
From: Anna Hudson  To: Nevaeh Aguilar MD  Sent: 2/17/2020 12:59 PM CST  Subject: Other    hi Dr Evaristo Goldberg. Did you receive any correspondence from the [facility] of Disability Determination .  They had requested paper work from you 2 weeks ago and they have not

## 2020-02-21 ENCOUNTER — TELEPHONE (OUTPATIENT)
Dept: FAMILY MEDICINE CLINIC | Facility: CLINIC | Age: 50
End: 2020-02-21

## 2020-03-02 ENCOUNTER — HOSPITAL ENCOUNTER (OUTPATIENT)
Dept: GENERAL RADIOLOGY | Age: 50
Discharge: HOME | End: 2020-03-02
Attending: INTERNAL MEDICINE
Payer: MEDICAID

## 2020-03-02 DIAGNOSIS — M25.551 RIGHT HIP PAIN: ICD-10-CM

## 2020-03-02 PROCEDURE — 73502 X-RAY EXAM HIP UNI 2-3 VIEWS: CPT | Performed by: INTERNAL MEDICINE

## 2020-03-09 ENCOUNTER — PATIENT MESSAGE (OUTPATIENT)
Dept: RHEUMATOLOGY | Facility: CLINIC | Age: 50
End: 2020-03-09

## 2020-03-10 RX ORDER — LOSARTAN POTASSIUM 50 MG/1
50 TABLET ORAL DAILY
Qty: 90 TABLET | Refills: 1 | Status: SHIPPED | OUTPATIENT
Start: 2020-03-10 | End: 2020-11-09

## 2020-03-10 NOTE — TELEPHONE ENCOUNTER
From: Ozzie Cash  To: Fernanda uL MD  Sent: 3/9/2020 10:20 AM CDT  Subject: Other    Hi Dr Nadine Pittman. I need a refill of losartan 50 mg tablets take 1 tablet every day. .. Blair Perez Thank You.

## 2020-03-17 ENCOUNTER — PATIENT MESSAGE (OUTPATIENT)
Dept: FAMILY MEDICINE CLINIC | Facility: CLINIC | Age: 50
End: 2020-03-17

## 2020-03-18 ENCOUNTER — PATIENT MESSAGE (OUTPATIENT)
Dept: RHEUMATOLOGY | Facility: CLINIC | Age: 50
End: 2020-03-18

## 2020-03-18 RX ORDER — PREDNISONE 2.5 MG
2.5 TABLET ORAL 2 TIMES DAILY
Qty: 60 TABLET | Refills: 2 | Status: SHIPPED | OUTPATIENT
Start: 2020-03-18 | End: 2020-06-15

## 2020-03-18 RX ORDER — POTASSIUM CHLORIDE 750 MG/1
20 TABLET, FILM COATED, EXTENDED RELEASE ORAL 2 TIMES DAILY
Qty: 120 TABLET | Refills: 0 | Status: SHIPPED | OUTPATIENT
Start: 2020-03-18 | End: 2020-05-29

## 2020-03-18 NOTE — TELEPHONE ENCOUNTER
From: Lukas Morris  To: Sudarshan Jack MD  Sent: 3/18/2020 3:35 PM CDT  Subject: Other    Hi Dr Morrow Mins. I need a refill of Prednisone 2.5 mg Tablets . Take 1 tablet twice daily

## 2020-03-18 NOTE — TELEPHONE ENCOUNTER
Last filled: 12/23/2019    LOV: 1/13/2020  Future Appointments   Date Time Provider Kim Verma   3/30/2020 10:20 AM Ben Paris MD 2014 [facility]     Labs:   Component      Latest Ref Rng & Units 1/13/2020   SED RATE      0 - 20 mm/Hr 9

## 2020-03-18 NOTE — TELEPHONE ENCOUNTER
From: Chi Montanez  To: Cuca Menjivar MD  Sent: 3/17/2020 6:44 PM CDT  Subject: Other    Hi Dr Lis Burnett . I need a refill of Potassium CL 10 Meq Er Tablets . Take 2 tablets twice daily. Thank You.

## 2020-04-17 ENCOUNTER — PATIENT MESSAGE (OUTPATIENT)
Dept: RHEUMATOLOGY | Facility: CLINIC | Age: 50
End: 2020-04-17

## 2020-04-17 DIAGNOSIS — M34.9 SCLERODERMA (HCC): ICD-10-CM

## 2020-04-17 RX ORDER — FOLIC ACID 1 MG/1
3 TABLET ORAL DAILY
Qty: 90 TABLET | Refills: 11 | Status: SHIPPED | OUTPATIENT
Start: 2020-04-17 | End: 2021-05-05

## 2020-04-17 NOTE — TELEPHONE ENCOUNTER
LOV: 1/13/20  Future Appointments   Date Time Provider Kim Luci   5/11/2020 10:00 AM Tea Machado MD 2014 [facility]     Labs:   Component      Latest Ref Rng & Units 1/13/2020   WBC      4.0 - 11.0 x10(3) uL 6.8   RBC      3.80 - 5.30 x

## 2020-04-17 NOTE — TELEPHONE ENCOUNTER
From: Oneda Oppenheim  To: Jesus Sheldon MD  Sent: 4/17/2020 9:28 AM CDT  Subject: Other    Hi Dr Jesus Sheldon. I need a refill of folic Acid 1mg tablets. take 3 tablets by mouth daily. Ruth Ann Martinez Thank you.

## 2020-05-09 DIAGNOSIS — M34.9 SCLERODERMA (HCC): ICD-10-CM

## 2020-05-09 NOTE — TELEPHONE ENCOUNTER
LOV: 1-13-20  Last Refilled:#45, 2rfs  2/8/20  Labs:AST 27 ALT 36  1/13/20    No future appointments. Please advise.

## 2020-05-11 NOTE — TELEPHONE ENCOUNTER
A [employer] message was sent today, per Dr. Mariposa Rogers request for patient to schedule an office visit or virtual video visit.

## 2020-05-15 RX ORDER — POTASSIUM CHLORIDE 750 MG/1
TABLET, FILM COATED, EXTENDED RELEASE ORAL
Qty: 120 TABLET | Refills: 0 | OUTPATIENT
Start: 2020-05-15

## 2020-05-15 NOTE — TELEPHONE ENCOUNTER
LOV: 10/16/2019  LF:3/18/20    Patient is due for 6 month f/u. Medication denied at this time   No voicemail set up phone just continues to ring.

## 2020-05-20 ENCOUNTER — TELEPHONE (OUTPATIENT)
Dept: FAMILY MEDICINE CLINIC | Facility: CLINIC | Age: 50
End: 2020-05-20

## 2020-05-20 DIAGNOSIS — D64.9 LOW HEMOGLOBIN: Primary | ICD-10-CM

## 2020-05-20 NOTE — TELEPHONE ENCOUNTER
Patient is going to be coming in on 5/29/20 for 6 month follow up. Patient will be coming in a little early that day to complete labs. BMP is ordered is there any additional labs patient should complete?

## 2020-05-20 NOTE — TELEPHONE ENCOUNTER
Pt is coming in for repeat BMP lab d/t low K+, she is asking if you want her to have any additional labs drawn now. Last annual labs 10/16/19. Please advise. Thank you.

## 2020-05-26 ENCOUNTER — TELEPHONE (OUTPATIENT)
Dept: FAMILY MEDICINE CLINIC | Facility: CLINIC | Age: 50
End: 2020-05-26

## 2020-05-26 NOTE — TELEPHONE ENCOUNTER
Patient called questioning why she has an order for lab from 5/21. Please call Pt with explanation, thank you.

## 2020-05-26 NOTE — TELEPHONE ENCOUNTER
Called patient and spoke with her. Patient is concerned because her lab orders were placed under Crytsal Aquino not Dr. Kali Ramírez.  Explained to patient that Crystal Aquino is Dr. Mary Rubin NP and that these are the 2 labs she needs to have completed.   Patient states Bard Francis

## 2020-05-29 ENCOUNTER — LABORATORY ENCOUNTER (OUTPATIENT)
Dept: LAB | Age: 50
End: 2020-05-29
Attending: NURSE PRACTITIONER
Payer: MEDICAID

## 2020-05-29 ENCOUNTER — OFFICE VISIT (OUTPATIENT)
Dept: FAMILY MEDICINE CLINIC | Facility: CLINIC | Age: 50
End: 2020-05-29
Payer: MEDICAID

## 2020-05-29 VITALS
RESPIRATION RATE: 16 BRPM | HEART RATE: 90 BPM | OXYGEN SATURATION: 98 % | SYSTOLIC BLOOD PRESSURE: 102 MMHG | TEMPERATURE: 98 F | DIASTOLIC BLOOD PRESSURE: 70 MMHG

## 2020-05-29 DIAGNOSIS — D64.9 LOW HEMOGLOBIN: ICD-10-CM

## 2020-05-29 DIAGNOSIS — M34.9 SCLERODERMA (HCC): Primary | ICD-10-CM

## 2020-05-29 DIAGNOSIS — M79.604 PAIN IN BOTH LOWER EXTREMITIES: ICD-10-CM

## 2020-05-29 DIAGNOSIS — K12.1 MOUTH ULCER: ICD-10-CM

## 2020-05-29 DIAGNOSIS — I89.0 LYMPHEDEMA: ICD-10-CM

## 2020-05-29 DIAGNOSIS — M79.605 PAIN IN BOTH LOWER EXTREMITIES: ICD-10-CM

## 2020-05-29 DIAGNOSIS — I73.00 RAYNAUD'S DISEASE WITHOUT GANGRENE: ICD-10-CM

## 2020-05-29 DIAGNOSIS — E87.6 HYPOKALEMIA: ICD-10-CM

## 2020-05-29 DIAGNOSIS — R29.898 WEAKNESS OF BOTH LOWER EXTREMITIES: ICD-10-CM

## 2020-05-29 PROCEDURE — 80048 BASIC METABOLIC PNL TOTAL CA: CPT

## 2020-05-29 PROCEDURE — 36415 COLL VENOUS BLD VENIPUNCTURE: CPT

## 2020-05-29 PROCEDURE — 85025 COMPLETE CBC W/AUTO DIFF WBC: CPT

## 2020-05-29 PROCEDURE — 99215 OFFICE O/P EST HI 40 MIN: CPT | Performed by: FAMILY MEDICINE

## 2020-05-29 RX ORDER — TORSEMIDE 5 MG/1
5 TABLET ORAL 2 TIMES DAILY
Qty: 60 TABLET | Refills: 3 | Status: SHIPPED | OUTPATIENT
Start: 2020-05-29 | End: 2020-09-25

## 2020-05-29 RX ORDER — POTASSIUM CHLORIDE 750 MG/1
20 TABLET, FILM COATED, EXTENDED RELEASE ORAL 2 TIMES DAILY
Qty: 120 TABLET | Refills: 1 | Status: SHIPPED | OUTPATIENT
Start: 2020-05-29 | End: 2020-09-25

## 2020-05-30 NOTE — PROGRESS NOTES
Chief Complaint:   Patient presents with: Follow - Up    HPI:   This is a 52year old female 45-year-old female presenting to the office with lower extremity edema which is gotten worse.   Patient is currently being followed by rheumatologist for autoimmun HIVES  Plaquenil [Hydroxyc*    HIVES  Clindamycin             OTHER (SEE COMMENTS)    Comment:States she got c-diff after taking this             antibiotic.   Zithromax [Azithrom*    FEVER  Current Meds:  Current Outpatient Medications   Medication Sig Dis abdominal pain, diarrhea, blood in stool and abdominal distention. Endocrine: Negative for cold intolerance, heat intolerance, polydipsia, polyphagia and polyuria. Genitourinary: Negative for dysuria, hematuria, flank pain and difficulty urinating.    David Reed deviation present. No thyromegaly present. Cardiovascular: Normal rate, regular rhythm, normal heart sounds and intact distal pulses. Exam reveals no gallop and no friction rub. No murmur heard. Edema not present. Carotid bruit not present.   Pulmona (two) times a day. -     EMG ([facility])    Lymphedema  -will add water pill because of pitting edema, will check labs. -     torsemide 5 MG Oral Tab; Take 1 tablet (5 mg total) by mouth 2 (two) times a day.   -     EMG (EDW N

## 2020-06-03 ENCOUNTER — TELEPHONE (OUTPATIENT)
Dept: FAMILY MEDICINE CLINIC | Facility: CLINIC | Age: 50
End: 2020-06-03

## 2020-06-03 DIAGNOSIS — E87.6 HYPOKALEMIA: Primary | ICD-10-CM

## 2020-06-03 NOTE — TELEPHONE ENCOUNTER
----- Message from Efren Brar MD sent at 6/3/2020 11:35 AM CDT -----  Stable labs. I put in order for stat lower extremity dopplers, not sure why she didn't get it done? Recheck labs in 3 months. lmom for pt with results/instructions.   The US was ap

## 2020-06-05 ENCOUNTER — APPOINTMENT (OUTPATIENT)
Dept: LAB | Facility: HOSPITAL | Age: 50
End: 2020-06-05
Attending: INTERNAL MEDICINE
Payer: MEDICAID

## 2020-06-05 ENCOUNTER — OFFICE VISIT (OUTPATIENT)
Dept: RHEUMATOLOGY | Facility: CLINIC | Age: 50
End: 2020-06-05
Payer: MEDICAID

## 2020-06-05 VITALS
HEIGHT: 67 IN | HEART RATE: 96 BPM | DIASTOLIC BLOOD PRESSURE: 68 MMHG | BODY MASS INDEX: 24.8 KG/M2 | SYSTOLIC BLOOD PRESSURE: 104 MMHG | RESPIRATION RATE: 16 BRPM | WEIGHT: 158 LBS

## 2020-06-05 DIAGNOSIS — M34.9 SCLERODERMA (HCC): ICD-10-CM

## 2020-06-05 DIAGNOSIS — M79.89 SWELLING OF LOWER EXTREMITY: ICD-10-CM

## 2020-06-05 DIAGNOSIS — M35.00 SJOGREN'S SYNDROME, WITH UNSPECIFIED ORGAN INVOLVEMENT (HCC): ICD-10-CM

## 2020-06-05 DIAGNOSIS — M34.9 SCLERODERMA (HCC): Primary | ICD-10-CM

## 2020-06-05 DIAGNOSIS — Z51.81 THERAPEUTIC DRUG MONITORING: ICD-10-CM

## 2020-06-05 PROCEDURE — 81001 URINALYSIS AUTO W/SCOPE: CPT

## 2020-06-05 PROCEDURE — 99214 OFFICE O/P EST MOD 30 MIN: CPT | Performed by: INTERNAL MEDICINE

## 2020-06-05 PROCEDURE — 85652 RBC SED RATE AUTOMATED: CPT

## 2020-06-05 PROCEDURE — 86140 C-REACTIVE PROTEIN: CPT

## 2020-06-05 PROCEDURE — 84450 TRANSFERASE (AST) (SGOT): CPT

## 2020-06-05 PROCEDURE — 36415 COLL VENOUS BLD VENIPUNCTURE: CPT

## 2020-06-05 PROCEDURE — 84460 ALANINE AMINO (ALT) (SGPT): CPT

## 2020-06-05 RX ORDER — MYCOPHENOLATE MOFETIL 500 MG/1
500 TABLET ORAL 2 TIMES DAILY
Qty: 60 TABLET | Refills: 1 | Status: SHIPPED | OUTPATIENT
Start: 2020-06-05 | End: 2020-09-11

## 2020-06-05 NOTE — PROGRESS NOTES
Helena Wells is a 52year old female who presents for Patient presents with: Follow - Up: Scleroderma  Medication Follow-Up  Hip Pain: pt c/o can't feel below knee   Swelling: B/L Legs, ulcers fluid discharge onset x3 months  .    HPI:     I had the medication. She gets periods of fatigue. She's had this in the past before. She has passed out before - was taken to hospital about 2 years ago - high liver enzymes. She had labs in [facility].    She had a 2decho at [employer] - aamir little bit about at that time one month ago - she is on normal doses now. The rayanud's is a little bit there in her feet. She's doing ok. The tips of her fingers are getting more sore. She has small calcifications at tips of finger. s     4/30/2018  She not openid up but there is a lesion there. It hurts to touch. Her feet feel swollen . She has more raynad's in her feet than before. Everything hurt.s   The gabapentin helped in the beginning . But it stopped working. No side effects. Not drowsy.    The balance and equilibruim issues. She feels she has Sjogrens' sympotms. sunday has dry mouth - she's had this for years. She gets sore starting on her tongue. She has had SSA since 2014 and 2018 - repeatedly - c/w overlap of sjogrens' as well.    She has can't feel below the knee. She got sores on her leg that were oozing. This started on 5/27. She had taken a bath and she noticed that there is water that is oozing and not from her bath. Her legs are swollen x 3 months.  .. She was given water pills by mouth every 6 (six) hours as needed for Pain.         Past Medical History:   Diagnosis Date   • Esophageal reflux    • Scleroderma ([address]    • Scleroderma ([address]       Past Surgical History:   Procedure Laterality Date   • HYSTERECTOMY      AGE 35   • ARIANA BI other ROS are negative.      EXAM:   /68 (BP Location: Left arm, Patient Position: Sitting, Cuff Size: adult)   Pulse 96   Resp 16   Ht 5' 7\" (1.702 m)   Wt 158 lb (71.7 kg)   LMP 01/21/2013   BMI 24.75 kg/m²   HEENT: Clear oropharynx, no oral ulcers (L)   RBC      3.80 - 5.30 x10(6)uL 3.32 (L)   Hemoglobin      12.0 - 16.0 g/dL 12.2   Hematocrit      35.0 - 48.0 % 37.2   Platelet Count      704.4 - 450.0 10(3)uL 146.0 (L)   MCV      80.0 - 100.0 fL 112.0 (H)   MCH      26.0 - 34.0 pg 36.7 (H)   MCHC limits.    No evidence of restriction or obstruction.  \  There is mild increased RV / TLC ratio with normal TLC ,   suggestive of air trapping     The diffusing capacity is normal    Compared to the previous PFT dated 8.6.14  there  has been no    change increasing skin tightening and NEW EDEMA   - worsenting scleroderma - try to switch patient to cell cept 500mg bid -   - ok to stop methtorexate 9 tablets a week when thi sis approved. = cont. prednisoen 2.5mg a day.    - d/w her to get 2decho - will need folic acid to 3mg a day - to hel pwit the oral ulcers. - she thinks this is what really helped.  -   3. rayanuds' - on losartan - 50mg a day  -   Nifedipine - cuased - edema    consider amlodipine -     4.  Right anklle sprain -  -still having pain post alvarado

## 2020-06-05 NOTE — PATIENT INSTRUCTIONS
1. Cont. Methotrexate 9 tablets a week and  Cont.  folic acid 3 mg a day   2. Cont. ibuporfen 400mg twice a day -   3 . Cont.  losasrtan  50mg a day for raynaud's   4. Cont. Prednisone 2.5mg a day    5.  Return to [facility] in 3 month.s   6. check labs -   7

## 2020-06-10 ENCOUNTER — TELEPHONE (OUTPATIENT)
Dept: FAMILY MEDICINE CLINIC | Facility: CLINIC | Age: 50
End: 2020-06-10

## 2020-06-10 NOTE — TELEPHONE ENCOUNTER
Patient is requesting compression stockings with zippers because her mom is having trouble putting them on her. It does appear that this is an available product but does not appear to be medical grade. Please advise. Thank you!

## 2020-06-10 NOTE — TELEPHONE ENCOUNTER
Pt called again, she states that her Mom puts the compression socks on for her and is unable to get them on her because they are too tight.  She is asking for order for socks with zippers to be available when she comes in for appointment on Monday 6/15/2020

## 2020-06-10 NOTE — TELEPHONE ENCOUNTER
Pt called to ask if she needs to wear compression socks at night when she is in bed. Please advise, thank you!

## 2020-06-12 NOTE — TELEPHONE ENCOUNTER
FYI     Spoke to pt regarding that we do not order compression stockings with zippers - since they are not considered medical grade. Patient verbalizes understanding and states she will find some online.

## 2020-06-15 ENCOUNTER — HOSPITAL ENCOUNTER (OUTPATIENT)
Dept: ULTRASOUND IMAGING | Age: 50
Discharge: HOME | End: 2020-06-15
Attending: FAMILY MEDICINE
Payer: MEDICAID

## 2020-06-15 ENCOUNTER — HOSPITAL ENCOUNTER (EMERGENCY)
Facility: HOSPITAL | Age: 50
Discharge: HOME | End: 2020-06-15
Attending: EMERGENCY MEDICINE
Payer: MEDICAID

## 2020-06-15 VITALS
BODY MASS INDEX: 22.41 KG/M2 | HEART RATE: 89 BPM | TEMPERATURE: 99 F | HEIGHT: 70 IN | OXYGEN SATURATION: 99 % | WEIGHT: 156.5 LBS | SYSTOLIC BLOOD PRESSURE: 119 MMHG | DIASTOLIC BLOOD PRESSURE: 76 MMHG | RESPIRATION RATE: 19 BRPM

## 2020-06-15 DIAGNOSIS — M79.605 PAIN IN BOTH LOWER EXTREMITIES: ICD-10-CM

## 2020-06-15 DIAGNOSIS — I82.443 DEEP VEIN THROMBOSIS (DVT) OF TIBIAL VEIN OF BOTH LOWER EXTREMITIES, UNSPECIFIED CHRONICITY (HCC): Primary | ICD-10-CM

## 2020-06-15 DIAGNOSIS — E87.6 HYPOKALEMIA: ICD-10-CM

## 2020-06-15 DIAGNOSIS — M79.604 PAIN IN BOTH LOWER EXTREMITIES: ICD-10-CM

## 2020-06-15 PROCEDURE — 99284 EMERGENCY DEPT VISIT MOD MDM: CPT

## 2020-06-15 PROCEDURE — 93970 EXTREMITY STUDY: CPT | Performed by: FAMILY MEDICINE

## 2020-06-15 PROCEDURE — 80053 COMPREHEN METABOLIC PANEL: CPT | Performed by: EMERGENCY MEDICINE

## 2020-06-15 PROCEDURE — 36415 COLL VENOUS BLD VENIPUNCTURE: CPT

## 2020-06-15 PROCEDURE — 85025 COMPLETE CBC W/AUTO DIFF WBC: CPT | Performed by: EMERGENCY MEDICINE

## 2020-06-15 RX ORDER — PREDNISONE 2.5 MG/1
TABLET ORAL
Qty: 60 TABLET | Refills: 3 | Status: SHIPPED | OUTPATIENT
Start: 2020-06-15 | End: 2020-10-22

## 2020-06-15 NOTE — ED PROVIDER NOTES
Patient Seen in: [facility] Emergency Department      History   Patient presents with:  Deep Vein Thrombosis    Stated Complaint: bilateral DVT    HPI    This is a 31-year-old female who has history of scleroderma she is bedbound for the most part u Date   • HYSTERECTOMY      AGE 35   • ARIANA BIOPSY STEREO NODULE 1 SITE RIGHT (CPT=19081)  4/2015     Benign   • OOPHORECTOMY      AGE 33                    Social History    Tobacco Use      Smoking status: Never Smoker      Smokeless tobacco: Never Used within normal limits   CBC W/ DIFFERENTIAL - Abnormal; Notable for the following components:    RBC 3.22 (*)     HGB 11.5 (*)     HCT 34.7 (*)     .0 (*)     .8 (*)     MCH 35.7 (*)     RDW-SD 52.7 (*)     All other components within normal l MD  [address]    In 2 days            Medications Prescribed:  Discharge Medication List as of 6/15/2020  8:24 PM    START taking these medications    apixaban 5 MG Oral Tab  Take 2 tabs (10mg) by mouth twice daily fo

## 2020-06-15 NOTE — TELEPHONE ENCOUNTER
Last filled: 3/18/2020 #60 tab with 2 refills  LOV: 6/5/2020  Future Appointments   Date Time Provider Kim Verma   6/15/2020  3:00 PM PFS US [facility] [place]     Labs:   Component      Latest Ref Rng & Units 6/5/2020   Color Urine      Yellow Yellow   CLARITY URINE      Clear Hazy (A)   Spec Gravity      1.002 - 1.035 1.012   Glucose Urine      Negative mg/dL Negative   Bilirubin      Negative Negative   Ketones, UA      Negative mg/dL Negative   Blood Urine      Negative Negative   PH Urine      5.0 - 8.0 6.0   Protein Urine      Negative mg/dL Negative   Urobilinogen Urine      <2.0 <2.0   Nitrite Urine      Negative Negative   Leukocyte Esterase Urine      Negative Trace (A)   SQUAM EPI CELLS UR      /HPF Few   WBC Urine      0 - 5 /HPF 2   RBC URINE      0 - 2 /HPF 1   Bacteria Urine      None Seen /HPF Few (A)   ALT (SGPT)      13 - 56 U/L 53   AST (SGOT)      15 - 37 U/L 43 (H)   C-REACTIVE PROTEIN      <0.30 mg/dL <0.29   SED RATE      0 - 20 mm/Hr 9       Summary: 1. Cont. Methotrexate 9 tablets a week and  Cont.  folic acid 3 mg a day   2. Cont. ibuporfen 400mg twice a day -   3 . Cont.  losasrtan  50mg a day for raynaud's   4. Cont. Prednisone 2.5mg a day    5. Return to [facility] in 3 month.s   6. check labs -   7.  follow up with neurologist -   8. Check echo - [phone] - to schedule - needs sars testing prior to this - rule out pulmonary hypertension   9. Try to get mycophenolate for gradual incresaing skin changes. - stop methotrexate when this is approved. Margret Dubose MD  6/5/2020   9:36 AM  - [place]ed [place]- [place]  through [facility]    Please advise.

## 2020-06-16 ENCOUNTER — TELEPHONE (OUTPATIENT)
Dept: FAMILY MEDICINE CLINIC | Facility: CLINIC | Age: 50
End: 2020-06-16

## 2020-06-16 NOTE — TELEPHONE ENCOUNTER
Patient needs in office visit with Dr. Lisa Vazquez tomorrow or  Friday. Okay to double book if needed.

## 2020-06-16 NOTE — TELEPHONE ENCOUNTER
Patient called she is scheduled for 6/19 at 10:00am. If you need to talk to her then call her back at [phone]

## 2020-06-16 NOTE — CM/SW NOTE
Emergency Department Discharge Plan    Lyndsey Sorenson is a 52year old female who presented to the ED with bilateral DVTs. ED Case Manager was asked to assist in arranging for home anticoagulation.     Lyndsey Sorenson and I discussed indications for

## 2020-06-17 ENCOUNTER — TELEPHONE (OUTPATIENT)
Dept: RHEUMATOLOGY | Facility: CLINIC | Age: 50
End: 2020-06-17

## 2020-06-17 NOTE — TELEPHONE ENCOUNTER
Contacted health plan, per representative Hanane Bagley - echocardiogram with colorflow (CPT 50826) is a covered benefit and it does not require a PA. Call ref #[id number]. Attempted to contact pt. No answer, left message informing of above.  Pt advised to

## 2020-06-17 NOTE — TELEPHONE ENCOUNTER
Patient called, because her insurance could not tell her if they cover the echocardiogram with color flow, because they do not have a 5 digit code. Patient is requesting the 5 digit code be given to [facility].

## 2020-06-19 ENCOUNTER — OFFICE VISIT (OUTPATIENT)
Dept: FAMILY MEDICINE CLINIC | Facility: CLINIC | Age: 50
End: 2020-06-19
Payer: MEDICAID

## 2020-06-19 VITALS
SYSTOLIC BLOOD PRESSURE: 122 MMHG | TEMPERATURE: 99 F | DIASTOLIC BLOOD PRESSURE: 64 MMHG | HEART RATE: 98 BPM | OXYGEN SATURATION: 97 % | RESPIRATION RATE: 18 BRPM

## 2020-06-19 DIAGNOSIS — I82.4Y3 DEEP VEIN THROMBOSIS (DVT) OF PROXIMAL VEIN OF BOTH LOWER EXTREMITIES, UNSPECIFIED CHRONICITY (HCC): Primary | ICD-10-CM

## 2020-06-19 DIAGNOSIS — I73.00 RAYNAUD'S DISEASE WITHOUT GANGRENE: ICD-10-CM

## 2020-06-19 DIAGNOSIS — M79.604 PAIN IN BOTH LOWER EXTREMITIES: ICD-10-CM

## 2020-06-19 DIAGNOSIS — M34.9 SCLERODERMA (HCC): ICD-10-CM

## 2020-06-19 DIAGNOSIS — M79.605 PAIN IN BOTH LOWER EXTREMITIES: ICD-10-CM

## 2020-06-19 PROCEDURE — 1111F DSCHRG MED/CURRENT MED MERGE: CPT | Performed by: FAMILY MEDICINE

## 2020-06-19 PROCEDURE — 99215 OFFICE O/P EST HI 40 MIN: CPT | Performed by: FAMILY MEDICINE

## 2020-06-20 NOTE — PROGRESS NOTES
[facility]    Progress Note    Allegra Jama Patient Status:  No patient class for patient encounter    12/15/1970 MRN [id number]   Location [address] [place] [address], [address] Attending No att. providers found   [address] atraumatic. Nose: Nose normal.      Mouth/Throat:      Pharynx: No oropharyngeal exudate. Eyes:      General: No scleral icterus. Right eye: No discharge. Left eye: No discharge.       Pupils: Pupils are equal, round, and reactive to l continue with high dose 10 mg BID x 1 week, then 10 mg q daily until seen by Dr. Merlyn Phoenix  - apixaban (ELIQUIS) 2.5 MG Oral Tab; Take 2 tablets (5 mg total) by mouth 2 (two) times daily.  Starts afte

## 2020-06-20 NOTE — PROGRESS NOTES
Patient: Gaurang Sarmiento  Medical Record Number: [id number]  Site: {PHY_SITES:904}  Referring Physician:  No ref. provider found    Dear Dr. Kemi Hunt ref. provider found:    Gaurang Sarmiento presents in follow up. As you recall ***.      PMHx, PSHx and ROS

## 2020-06-25 ENCOUNTER — HOSPITAL ENCOUNTER (OUTPATIENT)
Dept: CV DIAGNOSTICS | Facility: HOSPITAL | Age: 50
Discharge: HOME | End: 2020-06-25
Attending: INTERNAL MEDICINE
Payer: MEDICAID

## 2020-06-25 DIAGNOSIS — M34.9 SCLERODERMA (HCC): ICD-10-CM

## 2020-06-25 DIAGNOSIS — M79.89 SWELLING OF LOWER EXTREMITY: ICD-10-CM

## 2020-06-25 PROCEDURE — 93306 TTE W/DOPPLER COMPLETE: CPT | Performed by: INTERNAL MEDICINE

## 2020-07-06 ENCOUNTER — OFFICE VISIT (OUTPATIENT)
Dept: HEMATOLOGY/ONCOLOGY | Facility: HOSPITAL | Age: 50
End: 2020-07-06
Attending: INTERNAL MEDICINE
Payer: MEDICAID

## 2020-07-06 VITALS
RESPIRATION RATE: 16 BRPM | WEIGHT: 159 LBS | BODY MASS INDEX: 22.76 KG/M2 | SYSTOLIC BLOOD PRESSURE: 108 MMHG | TEMPERATURE: 99 F | OXYGEN SATURATION: 97 % | HEART RATE: 70 BPM | HEIGHT: 70 IN | DIASTOLIC BLOOD PRESSURE: 70 MMHG

## 2020-07-06 DIAGNOSIS — I82.4Z3 DVT, LOWER EXTREMITY, DISTAL, ACUTE, BILATERAL (HCC): Primary | ICD-10-CM

## 2020-07-06 DIAGNOSIS — M34.9 SCLERODERMA (HCC): ICD-10-CM

## 2020-07-06 PROCEDURE — 99245 OFF/OP CONSLTJ NEW/EST HI 55: CPT | Performed by: INTERNAL MEDICINE

## 2020-07-06 NOTE — PROGRESS NOTES
Education Record    Learner:  Patient and Family Member    Disease / Diagnosis:DVT    Barriers / Limitations:  None   Comments:    Method:  Brief focused   Comments:    General Topics:  Plan of care reviewed   Comments:    Outcome:  Shows understanding   C

## 2020-07-06 NOTE — PROGRESS NOTES
Hematology Initial Consultation Note    Patient Name: Hannah Lozano  Medical Record Number: [id number]    YOB: 1970   Date of Consultation: 7/6/2020   PCP: Dr. Tika Santana  Other providers: Dr. Jonathan Chinchilla (rheum), Dr. Seth Situ or long distance travel. No bleeding or excessive bruising since started Eliquis.     Past Medical History:  Past Medical History:   Diagnosis Date   • Esophageal reflux    • Raynaud disease    • [facility] 2014   • Sjogren's disease ([facility]     Keith as needed for Pain., Disp: , Rfl:   omeprazole 20 MG Oral Capsule Delayed Release, Take 20 mg by mouth daily. , Disp: , Rfl:       Allergies:     Gabapentin              HIVES  Plaquenil [Hydroxyc*    HIVES  Clindamycin             OTHER (SEE COMMENTS)    C bilaterally  GI/Abd: Soft, non-tender with normoactive bowel sounds, no hepatosplenomegaly  Neurological: Grossly intact   Lymphatics: No palpable lymphadenopathy  Skin: no rashes or petechiae  Ext: +BLE edema with skin thickening noted. No tenderness. syd- order placed  -will check a ddimer at the 3mth syd as well. *Scleroderma and Sjogren's overlap  -Follows with rheumatology, currently on methotrexate and mycophenolate.   I suspect the skin thickening and edema is in part related to this underlyi

## 2020-07-10 DIAGNOSIS — M34.9 SCLERODERMA (HCC): ICD-10-CM

## 2020-07-10 NOTE — TELEPHONE ENCOUNTER
Last filled: 5/9/2020 #45 tab with 1 refill   LOV: 6/5/2020  Future Appointments   Date Time Provider Kim Verma   8/31/2020  8:30 AM PFS [facility]   9/3/2020 10:30 AM Giselle Gonzalez MD [facility] 10:   Compone

## 2020-07-13 ENCOUNTER — TELEPHONE (OUTPATIENT)
Dept: HEMATOLOGY/ONCOLOGY | Facility: HOSPITAL | Age: 50
End: 2020-07-13

## 2020-07-13 NOTE — TELEPHONE ENCOUNTER
[facility] Only was able to get from local pharmacy for 1 times fill. Refill sent.

## 2020-08-27 ENCOUNTER — LABORATORY ENCOUNTER (OUTPATIENT)
Dept: LAB | Age: 50
End: 2020-08-27
Attending: INTERNAL MEDICINE
Payer: MEDICAID

## 2020-08-27 DIAGNOSIS — E87.6 HYPOKALEMIA: ICD-10-CM

## 2020-08-27 DIAGNOSIS — I82.4Z3 DVT, LOWER EXTREMITY, DISTAL, ACUTE, BILATERAL (HCC): ICD-10-CM

## 2020-08-27 LAB
ALBUMIN SERPL-MCNC: 3.2 G/DL (ref 3.4–5)
ALBUMIN/GLOB SERPL: 1.1 {RATIO} (ref 1–2)
ALP LIVER SERPL-CCNC: 143 U/L (ref 39–100)
ALT SERPL-CCNC: 60 U/L (ref 13–56)
ANION GAP SERPL CALC-SCNC: 7 MMOL/L (ref 0–18)
AST SERPL-CCNC: 52 U/L (ref 15–37)
BASOPHILS # BLD AUTO: 0.01 X10(3) UL (ref 0–0.2)
BASOPHILS NFR BLD AUTO: 0.4 %
BILIRUB SERPL-MCNC: 1.1 MG/DL (ref 0.1–2)
BUN BLD-MCNC: 14 MG/DL (ref 7–18)
BUN/CREAT SERPL: 23 (ref 10–20)
CALCIUM BLD-MCNC: 9.3 MG/DL (ref 8.5–10.1)
CHLORIDE SERPL-SCNC: 112 MMOL/L (ref 98–112)
CO2 SERPL-SCNC: 24 MMOL/L (ref 21–32)
CREAT BLD-MCNC: 0.61 MG/DL (ref 0.55–1.02)
D-DIMER: 0.28 UG/ML FEU (ref ?–0.5)
DEPRECATED RDW RBC AUTO: 61.3 FL (ref 35.1–46.3)
EOSINOPHIL # BLD AUTO: 0.05 X10(3) UL (ref 0–0.7)
EOSINOPHIL NFR BLD AUTO: 1.8 %
ERYTHROCYTE [DISTWIDTH] IN BLOOD BY AUTOMATED COUNT: 14.8 % (ref 11–15)
GLOBULIN PLAS-MCNC: 2.9 G/DL (ref 2.8–4.4)
GLUCOSE BLD-MCNC: 91 MG/DL (ref 70–99)
HCT VFR BLD AUTO: 33.3 % (ref 35–48)
HGB BLD-MCNC: 11 G/DL (ref 12–16)
IMM GRANULOCYTES # BLD AUTO: 0 X10(3) UL (ref 0–1)
IMM GRANULOCYTES NFR BLD: 0 %
LYMPHOCYTES # BLD AUTO: 1.23 X10(3) UL (ref 1–4)
LYMPHOCYTES NFR BLD AUTO: 44.4 %
M PROTEIN MFR SERPL ELPH: 6.1 G/DL (ref 6.4–8.2)
MCH RBC QN AUTO: 37.2 PG (ref 26–34)
MCHC RBC AUTO-ENTMCNC: 33 G/DL (ref 31–37)
MCV RBC AUTO: 112.5 FL (ref 80–100)
MONOCYTES # BLD AUTO: 0.26 X10(3) UL (ref 0.1–1)
MONOCYTES NFR BLD AUTO: 9.4 %
NEUTROPHILS # BLD AUTO: 1.22 X10 (3) UL (ref 1.5–7.7)
NEUTROPHILS # BLD AUTO: 1.22 X10(3) UL (ref 1.5–7.7)
NEUTROPHILS NFR BLD AUTO: 44 %
OSMOLALITY SERPL CALC.SUM OF ELEC: 296 MOSM/KG (ref 275–295)
PATIENT FASTING Y/N/NP: YES
PLATELET # BLD AUTO: 120 10(3)UL (ref 150–450)
POTASSIUM SERPL-SCNC: 3.6 MMOL/L (ref 3.5–5.1)
RBC # BLD AUTO: 2.96 X10(6)UL (ref 3.8–5.3)
SODIUM SERPL-SCNC: 143 MMOL/L (ref 136–145)
WBC # BLD AUTO: 2.8 X10(3) UL (ref 4–11)

## 2020-08-27 PROCEDURE — 85379 FIBRIN DEGRADATION QUANT: CPT

## 2020-08-27 PROCEDURE — 80053 COMPREHEN METABOLIC PANEL: CPT

## 2020-08-27 PROCEDURE — 85025 COMPLETE CBC W/AUTO DIFF WBC: CPT

## 2020-08-27 PROCEDURE — 36415 COLL VENOUS BLD VENIPUNCTURE: CPT

## 2020-08-31 ENCOUNTER — HOSPITAL ENCOUNTER (OUTPATIENT)
Dept: ULTRASOUND IMAGING | Age: 50
Discharge: HOME | End: 2020-08-31
Attending: INTERNAL MEDICINE
Payer: MEDICAID

## 2020-08-31 DIAGNOSIS — I82.4Z3 DVT, LOWER EXTREMITY, DISTAL, ACUTE, BILATERAL (HCC): ICD-10-CM

## 2020-08-31 PROCEDURE — 93970 EXTREMITY STUDY: CPT | Performed by: INTERNAL MEDICINE

## 2020-09-03 ENCOUNTER — APPOINTMENT (OUTPATIENT)
Dept: HEMATOLOGY/ONCOLOGY | Facility: HOSPITAL | Age: 50
End: 2020-09-03
Attending: INTERNAL MEDICINE
Payer: MEDICAID

## 2020-09-11 ENCOUNTER — TELEPHONE (OUTPATIENT)
Dept: RHEUMATOLOGY | Facility: CLINIC | Age: 50
End: 2020-09-11

## 2020-09-11 ENCOUNTER — LAB ENCOUNTER (OUTPATIENT)
Dept: LAB | Facility: HOSPITAL | Age: 50
End: 2020-09-11
Attending: INTERNAL MEDICINE
Payer: MEDICAID

## 2020-09-11 ENCOUNTER — OFFICE VISIT (OUTPATIENT)
Dept: RHEUMATOLOGY | Facility: CLINIC | Age: 50
End: 2020-09-11
Payer: MEDICAID

## 2020-09-11 VITALS
RESPIRATION RATE: 16 BRPM | DIASTOLIC BLOOD PRESSURE: 77 MMHG | BODY MASS INDEX: 22.76 KG/M2 | WEIGHT: 159 LBS | HEIGHT: 70 IN | SYSTOLIC BLOOD PRESSURE: 112 MMHG | HEART RATE: 86 BPM

## 2020-09-11 DIAGNOSIS — M34.9 SCLERODERMA (HCC): ICD-10-CM

## 2020-09-11 DIAGNOSIS — M34.9 SCLERODERMA (HCC): Primary | ICD-10-CM

## 2020-09-11 DIAGNOSIS — Z51.81 THERAPEUTIC DRUG MONITORING: ICD-10-CM

## 2020-09-11 DIAGNOSIS — R20.0 BILATERAL LEG NUMBNESS: ICD-10-CM

## 2020-09-11 LAB
ALT SERPL-CCNC: 58 U/L (ref 13–56)
AST SERPL-CCNC: 48 U/L (ref 15–37)
CREAT BLD-MCNC: 0.51 MG/DL (ref 0.55–1.02)
CRP SERPL-MCNC: <0.29 MG/DL (ref ?–0.3)
DEPRECATED RDW RBC AUTO: 63.2 FL (ref 35.1–46.3)
ERYTHROCYTE [DISTWIDTH] IN BLOOD BY AUTOMATED COUNT: 14.7 % (ref 11–15)
ERYTHROCYTE [SEDIMENTATION RATE] IN BLOOD: 13 MM/HR (ref 0–20)
HCT VFR BLD AUTO: 33.7 % (ref 35–48)
HGB BLD-MCNC: 11.3 G/DL (ref 12–16)
MCH RBC QN AUTO: 38.4 PG (ref 26–34)
MCHC RBC AUTO-ENTMCNC: 33.5 G/DL (ref 31–37)
MCV RBC AUTO: 114.6 FL (ref 80–100)
PLATELET # BLD AUTO: 117 10(3)UL (ref 150–450)
RBC # BLD AUTO: 2.94 X10(6)UL (ref 3.8–5.3)
WBC # BLD AUTO: 2.8 X10(3) UL (ref 4–11)

## 2020-09-11 PROCEDURE — 86140 C-REACTIVE PROTEIN: CPT

## 2020-09-11 PROCEDURE — 84450 TRANSFERASE (AST) (SGOT): CPT

## 2020-09-11 PROCEDURE — 82565 ASSAY OF CREATININE: CPT

## 2020-09-11 PROCEDURE — 99214 OFFICE O/P EST MOD 30 MIN: CPT | Performed by: INTERNAL MEDICINE

## 2020-09-11 PROCEDURE — 3078F DIAST BP <80 MM HG: CPT | Performed by: INTERNAL MEDICINE

## 2020-09-11 PROCEDURE — 84460 ALANINE AMINO (ALT) (SGPT): CPT

## 2020-09-11 PROCEDURE — 36415 COLL VENOUS BLD VENIPUNCTURE: CPT

## 2020-09-11 PROCEDURE — 3008F BODY MASS INDEX DOCD: CPT | Performed by: INTERNAL MEDICINE

## 2020-09-11 PROCEDURE — 85027 COMPLETE CBC AUTOMATED: CPT

## 2020-09-11 PROCEDURE — 85652 RBC SED RATE AUTOMATED: CPT

## 2020-09-11 PROCEDURE — 3074F SYST BP LT 130 MM HG: CPT | Performed by: INTERNAL MEDICINE

## 2020-09-11 PROCEDURE — 85060 BLOOD SMEAR INTERPRETATION: CPT

## 2020-09-11 RX ORDER — MYCOPHENOLATE MOFETIL 500 MG/1
500 TABLET ORAL 2 TIMES DAILY
Qty: 60 TABLET | Refills: 1 | Status: SHIPPED | OUTPATIENT
Start: 2020-09-11 | End: 2021-01-29

## 2020-09-11 NOTE — PROGRESS NOTES
Katina Nash is a 52year old female who presents for Patient presents with:   Follow - Up: Scleroderma  Medication Follow-Up  Hip Pain: B/L  Leg Pain: Upper, B/L  .   HPI:     I had the pleasure of seeing Katina Nash on 3/27/2017 for evaluatio past before. She has passed out before - was taken to hospital about 2 years ago - high liver enzymes. She had labs in [facility]. She had a 2decho at [employer] - that was good. She didn't have the PFT done recently.      She gets j now.   The rayanud's is a little bit there in her feet. She's doing ok. The tips of her fingers are getting more sore. She has small calcifications at tips of finger. s     4/30/2018  She was getting colds and flu in Winter.  She had flare up of mouth ulcers feet feel swollen . She has more raynad's in her feet than before. Everything hurt.s   The gabapentin helped in the beginning . But it stopped working. No side effects. Not drowsy. The ibuprofen helps more.      3/4/2019  She was coming into work - she sympotms. sunday has dry mouth - she's had this for years. She gets sore starting on her tongue. She has had SSA since 2014 and 2018 - repeatedly - c/w overlap of sjogrens' as well.    She has some weight loss as well in the past - she has been down to 1 were oozing. This started on 5/27. She had taken a bath and she noticed that there is water that is oozing and not from her bath. Her legs are swollen x 3 months. .. She was given water pills by dr. Aida Calderon.  s  The water pills made her sick -w ith heada 120 tablet 1   • torsemide 5 MG Oral Tab Take 1 tablet (5 mg total) by mouth 2 (two) times a day. 60 tablet 3   • folic acid 1 MG Oral Tab Take 3 tablets (3 mg total) by mouth daily.  90 tablet 11   • losartan Potassium 50 MG Oral Tab Take 1 tablet (50 mg t lorri. Social History:  Social History    Tobacco Use      Smoking status: Never Smoker      Smokeless tobacco: Never Used    Alcohol use: No    Drug use: No     Working at [employer] - 6 hours for 5 days a week   Single, no kdins.         REVIEW OF S digital ulcers   She has skin tightness around mouth and hands and legs   Forerarms - no skin tightness -   SHE HAS SKIN TIGHTNESS IN LEGS -with EDEMA   NO SYNVOITIS -   telengectasias over legs   sclerodactly with some capillary decrease in right 3rd fing Eosinophils Absolute      0.00 - 0.70 x10(3) uL 0.05 0.02    Basophils Absolute      0.00 - 0.20 x10(3) uL 0.01 0.02    Immature Granulocyte Absolute      0.00 - 1.00 x10(3) uL 0.00 0.01    Neutrophils %      % 44.0 53.9    Lymphocytes %      % 44.4 32. 5 0 - 2 /HPF   1   Bacteria Urine      None Seen /HPF   Few (A)   C-REACTIVE PROTEIN      <0.30 mg/dL   <0.29   SED RATE      0 - 20 mm/Hr   9   D-Dimer      <0.50 ug/mL FEU 0.28         5/14/2018 - 2decho   1. Left ventricle:  The cavity size was normal. Sarika Cortés 7/10/2017 - left ankel xray   CONCLUSION:  No evidence of acute displaced fracture or dislocation in the left ankle. Soft tissue swelling. 5/3/2017 - ct angio   CONCLUSION:  No CT evidence for pulmonary embolism.      Bilateral atelectasis     8/31/2 She has seen neurology in 12/2018 - she had declined emg b/c of pain associated with this   - d/w her to  f/u with neurology regarding equilibrium issues and neuropathy - she did not complete work up for this and difficulty to say why she has neuropath 4/2019 - for her right ankle -   7. Equilibrium - f/u with neurologist  - help differentiated reasons for her ongoing numnbess in legs /neuropathy possibly versus radiculopathy - will try to get mri lumbar spine approved       Summary:  1 edison Vazquez

## 2020-09-11 NOTE — PATIENT INSTRUCTIONS
=1 decarease  Methotrexate to  5 tablets a week and  Cont.  folic acid 3 mg a day b/c of low white count   2. Cont. Mycophenolate 500mg ad ay -   3. Cont. ibuporfen 400mg twice a day -   4 . Cont.  losasrtan  50mg a day for raynaud's   5. Cont.   Predniso

## 2020-09-21 ENCOUNTER — OFFICE VISIT (OUTPATIENT)
Dept: HEMATOLOGY/ONCOLOGY | Facility: HOSPITAL | Age: 50
End: 2020-09-21
Attending: INTERNAL MEDICINE
Payer: MEDICAID

## 2020-09-21 VITALS
HEART RATE: 89 BPM | DIASTOLIC BLOOD PRESSURE: 80 MMHG | TEMPERATURE: 99 F | RESPIRATION RATE: 18 BRPM | OXYGEN SATURATION: 98 % | SYSTOLIC BLOOD PRESSURE: 127 MMHG

## 2020-09-21 DIAGNOSIS — Z79.01 CHRONIC ANTICOAGULATION: ICD-10-CM

## 2020-09-21 DIAGNOSIS — D53.9 MACROCYTIC ANEMIA: ICD-10-CM

## 2020-09-21 DIAGNOSIS — D61.818 OTHER PANCYTOPENIA (HCC): ICD-10-CM

## 2020-09-21 DIAGNOSIS — I82.4Z3 DVT, LOWER EXTREMITY, DISTAL, ACUTE, BILATERAL (HCC): Primary | ICD-10-CM

## 2020-09-21 DIAGNOSIS — M34.9 SCLERODERMA (HCC): ICD-10-CM

## 2020-09-21 DIAGNOSIS — D69.6 THROMBOCYTOPENIA (HCC): ICD-10-CM

## 2020-09-21 LAB
FOLATE SERPL-MCNC: 12.3 NG/ML (ref 8.7–?)
VIT B12 SERPL-MCNC: 1030 PG/ML (ref 193–986)

## 2020-09-21 PROCEDURE — 99215 OFFICE O/P EST HI 40 MIN: CPT | Performed by: INTERNAL MEDICINE

## 2020-09-21 NOTE — PROGRESS NOTES
Hematology Clinic Follow Up Visit    Patient Name: Ozzie Cash  Medical Record Number: [id number]    YOB: 1970   PCP: Dr. Trinidad Toth  Other providers: Dr. Andressa Jessica (rheum), Dr. Daisy Blanton (neurology)    Reason for daily., Disp: 60 tablet, Rfl: 1    •  PREDNISONE 2.5 MG Oral Tab, TAKE 1 TABLET BY MOUTH TWICE DAILY, Disp: 60 tablet, Rfl: 3    •  CHOLECALCIFEROL 125 MCG (5000 UT) Oral Tab, Take 1 tablet by mouth daily. , Disp: , Rfl:     •  PROBIOTIC-10 OR, Take by mout Mother    • Other (osteoarthritis) Mother    • Other (RA) Maternal Grandmother    • Other (psoriasis) Paternal Grandfather    • Cancer Sister    • Cancer Sister 43        lung, nonsmoker     Review of Systems:  A 10-point ROS was done with pertinent positi 08/27/2020    OSMOCALC 296 (H) 08/27/2020    ALKPHO 143 (H) 08/27/2020    AST 48 (H) 09/11/2020    ALT 58 (H) 09/11/2020    BILT 1.1 08/27/2020    TP 6.1 (L) 08/27/2020    ALB 3.2 (L) 08/27/2020    GLOBULIN 2.9 08/27/2020     08/27/2020    K 3.6 08/2

## 2020-09-23 ENCOUNTER — TELEPHONE (OUTPATIENT)
Dept: HEMATOLOGY/ONCOLOGY | Facility: HOSPITAL | Age: 50
End: 2020-09-23

## 2020-09-23 DIAGNOSIS — E61.0 COPPER DEFICIENCY: Primary | ICD-10-CM

## 2020-09-23 DIAGNOSIS — D61.818 OTHER PANCYTOPENIA (HCC): ICD-10-CM

## 2020-09-23 LAB — COPPER, SERUM: 76.3 UG/DL

## 2020-09-23 NOTE — TELEPHONE ENCOUNTER
Paul Haskins MD  P Edw Melissa Rojas Rns             Please call patient (or mother of patient) and advise that her copper level is slightly low.  She should start taking copper gluconate 2mg daily.  This can be purchased at [facility], I'm not sure where else

## 2020-09-24 DIAGNOSIS — E87.6 HYPOKALEMIA: ICD-10-CM

## 2020-09-24 DIAGNOSIS — I89.0 LYMPHEDEMA: ICD-10-CM

## 2020-09-24 NOTE — TELEPHONE ENCOUNTER
Medication(s) to Refill:   Requested Prescriptions     Pending Prescriptions Disp Refills   • POTASSIUM CHLORIDE ER 10 MEQ Oral Tab CR [Pharmacy Med Name: POTASSIUM CL 10MEQ ER TABLETS] 120 tablet 1     Sig: TAKE 2 TABLET BY MOUTH TWICE DAILY   • TORSEMIDE

## 2020-09-25 RX ORDER — POTASSIUM CHLORIDE 750 MG/1
TABLET, FILM COATED, EXTENDED RELEASE ORAL
Qty: 120 TABLET | Refills: 1 | Status: SHIPPED | OUTPATIENT
Start: 2020-09-25 | End: 2021-02-02

## 2020-09-25 RX ORDER — TORSEMIDE 5 MG/1
TABLET ORAL
Qty: 60 TABLET | Refills: 3 | Status: SHIPPED | OUTPATIENT
Start: 2020-09-25 | End: 2021-02-02

## 2020-09-29 ENCOUNTER — HOSPITAL ENCOUNTER (OUTPATIENT)
Dept: MRI IMAGING | Facility: HOSPITAL | Age: 50
Discharge: HOME | End: 2020-09-29
Attending: INTERNAL MEDICINE
Payer: MEDICAID

## 2020-09-29 DIAGNOSIS — R20.0 BILATERAL LEG NUMBNESS: ICD-10-CM

## 2020-09-29 PROCEDURE — 72148 MRI LUMBAR SPINE W/O DYE: CPT | Performed by: INTERNAL MEDICINE

## 2020-10-07 ENCOUNTER — TELEPHONE (OUTPATIENT)
Dept: NEUROLOGY | Facility: CLINIC | Age: 50
End: 2020-10-07

## 2020-10-19 ENCOUNTER — OFFICE VISIT (OUTPATIENT)
Dept: FAMILY MEDICINE CLINIC | Facility: CLINIC | Age: 50
End: 2020-10-19
Payer: MEDICAID

## 2020-10-19 ENCOUNTER — LAB ENCOUNTER (OUTPATIENT)
Dept: LAB | Age: 50
End: 2020-10-19
Attending: FAMILY MEDICINE
Payer: MEDICAID

## 2020-10-19 VITALS
RESPIRATION RATE: 16 BRPM | OXYGEN SATURATION: 98 % | TEMPERATURE: 99 F | SYSTOLIC BLOOD PRESSURE: 128 MMHG | HEART RATE: 98 BPM | DIASTOLIC BLOOD PRESSURE: 70 MMHG

## 2020-10-19 DIAGNOSIS — Z12.31 ENCOUNTER FOR SCREENING MAMMOGRAM FOR MALIGNANT NEOPLASM OF BREAST: ICD-10-CM

## 2020-10-19 DIAGNOSIS — Z00.00 LABORATORY EXAMINATION ORDERED AS PART OF A ROUTINE GENERAL MEDICAL EXAMINATION: ICD-10-CM

## 2020-10-19 DIAGNOSIS — Z00.00 ANNUAL PHYSICAL EXAM: Primary | ICD-10-CM

## 2020-10-19 DIAGNOSIS — Z12.11 SCREEN FOR COLON CANCER: ICD-10-CM

## 2020-10-19 DIAGNOSIS — M34.9 SCLERODERMA (HCC): ICD-10-CM

## 2020-10-19 DIAGNOSIS — D53.9 MACROCYTIC ANEMIA: ICD-10-CM

## 2020-10-19 DIAGNOSIS — I73.00 RAYNAUD'S DISEASE WITHOUT GANGRENE: ICD-10-CM

## 2020-10-19 DIAGNOSIS — I82.4Z3 DVT, LOWER EXTREMITY, DISTAL, ACUTE, BILATERAL (HCC): ICD-10-CM

## 2020-10-19 PROCEDURE — 84443 ASSAY THYROID STIM HORMONE: CPT

## 2020-10-19 PROCEDURE — 3074F SYST BP LT 130 MM HG: CPT | Performed by: FAMILY MEDICINE

## 2020-10-19 PROCEDURE — 3078F DIAST BP <80 MM HG: CPT | Performed by: FAMILY MEDICINE

## 2020-10-19 PROCEDURE — 80061 LIPID PANEL: CPT

## 2020-10-19 PROCEDURE — 36415 COLL VENOUS BLD VENIPUNCTURE: CPT

## 2020-10-19 PROCEDURE — 99396 PREV VISIT EST AGE 40-64: CPT | Performed by: FAMILY MEDICINE

## 2020-10-19 PROCEDURE — 99214 OFFICE O/P EST MOD 30 MIN: CPT | Performed by: FAMILY MEDICINE

## 2020-10-20 ENCOUNTER — TELEPHONE (OUTPATIENT)
Dept: FAMILY MEDICINE CLINIC | Facility: CLINIC | Age: 50
End: 2020-10-20

## 2020-10-20 NOTE — TELEPHONE ENCOUNTER
Called patient and spoke with her. Advised her of results below and need to schedule a neurology appointment. Patient declines to let me assist in scheduling. Advised patient on the importance of this appointment to have her leg weakness evaluated.   Capri Peralta

## 2020-10-20 NOTE — TELEPHONE ENCOUNTER
----- Message from Philly Soriano MD sent at 10/20/2020  1:07 PM CDT -----  Stable labs. Please get patient an appointment to see [facility], anyone from their group, I prefer dr. Gomez Warren, or dr. Brittany Johnson.       Let provider's nurse know that patient ha

## 2020-10-21 NOTE — PROGRESS NOTES
Chief Complaint:   Patient presents with:  Physical    HPI:   This is a 52year old female presenting for annual physical.     Patient has a history of scleroderma currently being followed by rheumatology symptoms are not under control she has lower extrem Vertigo      Past Surgical History:   Procedure Laterality Date   • HYSTERECTOMY      AGE 35   • ARIANA BIOPSY STEREO NODULE 1 SITE RIGHT (CPT=19081)  4/2015     Benign   • OOPHORECTOMY      AGE 33     Social History:  Social History    Tobacco Use      Smoki Take 1 tablet (400 mg total) by mouth every 8 (eight) hours as needed for Pain. 90 tablet 3   • ibuprofen 600 MG Oral Tab Take 600 mg by mouth every 6 (six) hours as needed for Pain.      • omeprazole 20 MG Oral Capsule Delayed Release Take 20 mg by mouth d 98%  Estimated body mass index is 22.81 kg/m² as calculated from the following:    Height as of 9/11/20: 70\". Weight as of 9/11/20: 159 lb (72.1 kg). Vital signs reviewed. Appears stated age, well groomed.   Physical Exam   Nursing note and vitals revi Coordination and gait abnormal.   Skin: Skin is warm and dry. No lesion and no rash noted. No erythema. No pallor.    Lower extremity edema with decreased sensation to distal extremities no acute calf pain no swelling decreased mobility decreased strength o noncompliance patient has not seen neurology and not getting EMG AGAINST MEDICAL ADVICE.

## 2020-10-21 NOTE — TELEPHONE ENCOUNTER
Pt calling in, stating that she tried calling insurance to schedule a neurology appointment. Pt states that Dr. Lester Harding needs to sign off on a CPT code.  Please advise

## 2020-10-21 NOTE — TELEPHONE ENCOUNTER
Called patient and spoke with her. Patient states that she needs about 1 week to arrange for transportation. Advised patient that I would contact Dr. Weems Never office and schedule her an appointment. Patient states understanding.     Called Dr. Praful Mendez

## 2020-10-21 NOTE — TELEPHONE ENCOUNTER
Called patient and spoke with her. While patient was attempting to clarify below message mother came on the line. Mom stating that patient is needing an approved CPT code and MD authorization before patient can be seen.   Advised mom that this is a referr

## 2020-10-21 NOTE — TELEPHONE ENCOUNTER
Pt calling office, stating that she is unable to get a car rj to them needing more notice. Pt would like a call back to discuss what she should do.  Please advise

## 2020-10-22 RX ORDER — PREDNISONE 2.5 MG
2.5 TABLET ORAL DAILY
Qty: 60 TABLET | Refills: 2 | Status: SHIPPED | OUTPATIENT
Start: 2020-10-22 | End: 2020-10-23

## 2020-10-22 NOTE — TELEPHONE ENCOUNTER
LOV: 9/11/20  Future Appointments   Date Time Provider Kim Verma   11/2/2020  1:15 PM DO JANET Sanches      Per LOV note,  Summary:  1 decarease  Methotrexate to  5 tablets a week and  Cont.  folic acid 3 mg a day b/c of

## 2020-10-22 NOTE — TELEPHONE ENCOUNTER
Patient calling in to request refill on PREDNISONE 2.5 MG Oral Tab, she has contacted the pharmacy multiple times but we do not have any communications from the pharmacy so I am entering the request manually    Patient is almost out of medication, please a

## 2020-10-22 NOTE — TELEPHONE ENCOUNTER
Prescription is for prednisone 2.5mg BID, LOV says 2.5mg daily. Left message to call back to verify dose.     LOV: 9/11/20  Future Appointments   Date Time Provider Kim Verma   11/2/2020  1:15 PM DO JANET West     Per

## 2020-10-23 ENCOUNTER — TELEPHONE (OUTPATIENT)
Dept: RHEUMATOLOGY | Facility: CLINIC | Age: 50
End: 2020-10-23

## 2020-10-23 RX ORDER — PREDNISONE 2.5 MG
2.5 TABLET ORAL 2 TIMES DAILY
Qty: 60 TABLET | Refills: 2 | Status: SHIPPED | OUTPATIENT
Start: 2020-10-23 | End: 2021-01-29

## 2020-10-23 NOTE — TELEPHONE ENCOUNTER
Patient states she picked up her medication today and just realized it was sent incorrectly. Patient is taking this medication twice a day and she was given medication for once a day. Please advise.      predniSONE 2.5 MG Oral Tab 60 tablet 2 10/22/2020

## 2020-10-23 NOTE — TELEPHONE ENCOUNTER
Please see below and advise.     Last script sent prior    Provider Information    Authorizing Provider Encounter Provider   MD Juan J Aviles MD   Medication Detail    Medication Quantity Refills Start End   PREDNISONE 2.5 MG Tessa Lemon

## 2020-11-02 ENCOUNTER — OFFICE VISIT (OUTPATIENT)
Dept: NEUROLOGY | Facility: CLINIC | Age: 50
End: 2020-11-02
Payer: MEDICAID

## 2020-11-02 VITALS
HEART RATE: 70 BPM | DIASTOLIC BLOOD PRESSURE: 60 MMHG | RESPIRATION RATE: 14 BRPM | SYSTOLIC BLOOD PRESSURE: 118 MMHG | BODY MASS INDEX: 23 KG/M2 | WEIGHT: 159 LBS

## 2020-11-02 DIAGNOSIS — M34.9 SCLERODERMA (HCC): ICD-10-CM

## 2020-11-02 DIAGNOSIS — G82.20 PARAPLEGIA (HCC): ICD-10-CM

## 2020-11-02 DIAGNOSIS — R26.81 UNSTEADINESS ON FEET: Primary | ICD-10-CM

## 2020-11-02 DIAGNOSIS — R29.898 WEAKNESS OF BOTH LOWER EXTREMITIES: ICD-10-CM

## 2020-11-02 PROCEDURE — 3074F SYST BP LT 130 MM HG: CPT | Performed by: OTHER

## 2020-11-02 PROCEDURE — 3078F DIAST BP <80 MM HG: CPT | Performed by: OTHER

## 2020-11-02 PROCEDURE — 99214 OFFICE O/P EST MOD 30 MIN: CPT | Performed by: OTHER

## 2020-11-02 NOTE — PROGRESS NOTES
[address]   Neurology- f/u    Jack Hebert Patient Status:  No patient class for patient encounter    12/15/1970 MRN [id number]   Location Celine Gibbons MD              HPI become very stiff. She has increased numbness below her knees, and this has progressed. Her legs are also swollen in the past several months. Gets vertigo occasionally. Reports pain in her hips. Cannot feel temperature below the knees.  Denies weakness in h 2.19   PROTEIN ELECT INTERPRETATION       No apparent monoclonal protein on serum electrophoresis. . . . IMMUNOFIXATION       No monoclonal protein detected by immunofixation. . . .    KAPPA FREE LIGHT CHAIN      0.330 - 1.940 mg/dL 0.708   LAMBDA FREE 0 - 99 AU/mL <100   Scl-70 AUTOAB      0 - 99 AU/mL 107 (H)   DEDRICK-1 AUTOAB      0 - 99 AU/mL <100   dsDNA AUTOAB      0 - 99 IU/mL <100   CENTROMERE AUTOAB      0 - 99 AU/mL <100   HISTONE AUTOAB      0 - 99 AU/mL <100       Past Medical History:  Past Me mg total) by mouth 2 (two) times daily. , Disp: 60 tablet, Rfl: 1  •  CHOLECALCIFEROL 125 MCG (5000 UT) Oral Tab, Take 1 tablet by mouth daily. , Disp: , Rfl:   •  PROBIOTIC-10 OR, Take by mouth., Disp: , Rfl:   •  folic acid 1 MG Oral Tab, Take 3 tablets (3 knee jerk, and ankle jerk. Plantar responses were flexor bilaterally.  No clonus  Sensory exam revealed vibration absent up to knees, present at knees, pinprick decreased up to mid-upper anterior thighs, propioception absent in legs, including in knees, pin

## 2020-11-09 RX ORDER — LOSARTAN POTASSIUM 50 MG/1
50 TABLET ORAL DAILY
Qty: 90 TABLET | Refills: 1 | Status: SHIPPED | OUTPATIENT
Start: 2020-11-09 | End: 2021-05-19

## 2020-11-09 NOTE — TELEPHONE ENCOUNTER
Requested Prescriptions     Pending Prescriptions Disp Refills   • losartan Potassium 50 MG Oral Tab 90 tablet 1     Sig: Take 1 tablet (50 mg total) by mouth daily.      LF; 3/10/20 #90 TAB W/ 1 RF  LOV: 9/11/20   Future Appointments   Date Time Provider NICHOLAS -   3. Cont. ibuporfen 400mg twice a day -   4 . Cont.  losasrtan  50mg a day for raynaud's   5. Cont. Prednisone 2.5mg a day    6. Check MRI lumbar spine- not walking    7.  follow up with neurologist - dr. Christiano Thomas  -   8.  Cont.  mycophenolate 500mg a

## 2020-11-09 NOTE — TELEPHONE ENCOUNTER
Pharmacy request for refill    •  losartan Potassium 50 MG Oral Tab, Take 1 tablet (50 mg total) by mouth daily. , Disp: 90 tablet, Rfl: 1

## 2020-11-19 ENCOUNTER — LAB ENCOUNTER (OUTPATIENT)
Dept: LAB | Age: 50
End: 2020-11-19
Attending: INTERNAL MEDICINE
Payer: MEDICAID

## 2020-11-19 DIAGNOSIS — R29.898 WEAKNESS OF BOTH LOWER EXTREMITIES: ICD-10-CM

## 2020-11-19 DIAGNOSIS — R26.81 UNSTEADINESS ON FEET: ICD-10-CM

## 2020-11-19 PROCEDURE — 36415 COLL VENOUS BLD VENIPUNCTURE: CPT

## 2020-11-19 PROCEDURE — 82550 ASSAY OF CK (CPK): CPT

## 2020-11-25 NOTE — TELEPHONE ENCOUNTER
From: Bry Alert  To: Andrew Nguyen MD  Sent: 3/10/2017 3:32 PM CST  Subject: Non-Urgent Medical Question    Hi Dr Enmanuel Phillips . I\"am having a Image Cortisone Shot into the left hip and i want to know that is okay to get this being that i\"am taking medica 911 or go to the nearest Emergency Room

## 2020-12-11 ENCOUNTER — TELEPHONE (OUTPATIENT)
Dept: RHEUMATOLOGY | Facility: CLINIC | Age: 50
End: 2020-12-11

## 2020-12-11 ENCOUNTER — PATIENT MESSAGE (OUTPATIENT)
Dept: RHEUMATOLOGY | Facility: CLINIC | Age: 50
End: 2020-12-11

## 2020-12-11 RX ORDER — PREDNISONE 10 MG/1
TABLET ORAL
Qty: 30 TABLET | Refills: 0 | Status: SHIPPED | OUTPATIENT
Start: 2020-12-11 | End: 2021-03-03

## 2020-12-11 NOTE — TELEPHONE ENCOUNTER
I spoke to Warner. She has had similar lesions in the past.  It takes longer than 7 days for them to go away. I will send a prescription in for 30 more 10 mg prednisone tablets. She can take 1 a day as needed for mouth sores.

## 2020-12-11 NOTE — TELEPHONE ENCOUNTER
Regarding: Other  Contact: [phone]  ----- Message from Leyda Huang RN sent at 12/11/2020  3:16 PM CST -----       ----- Message from Sekou Terrazas to Matthew Mancilla MD sent at 12/11/2020 11:31 AM -----   Hi DR Marcia Ortez. I have 4 sores

## 2020-12-11 NOTE — TELEPHONE ENCOUNTER
Spoke with patient. C/o mouth ulcers as per below. 4 lesions on inner lips and frnt of mouth. They are not bleeding or oozing but are extremely painful. Difficult to eat and speak. Prescribed prednisone per PCP but is requesting longer term rx.   Has u

## 2020-12-11 NOTE — TELEPHONE ENCOUNTER
Attempted to call patient to further triage. Left message to call back. Routed to on call for advisement.

## 2020-12-11 NOTE — TELEPHONE ENCOUNTER
From: Mlilie Thornton  To: Edilia Houston MD  Sent: 12/11/2020 11:31 AM CST  Subject: Other    Hi DR COLEMAN. I have 4 sores my mouth that are very painful. It is very hard to talk to eat or drink.  I contacted DR Dior Glass and he gave me the prescript

## 2020-12-14 ENCOUNTER — OFFICE VISIT (OUTPATIENT)
Dept: SURGERY | Facility: CLINIC | Age: 50
End: 2020-12-14
Payer: MEDICAID

## 2020-12-14 VITALS
HEIGHT: 69 IN | WEIGHT: 159 LBS | SYSTOLIC BLOOD PRESSURE: 100 MMHG | DIASTOLIC BLOOD PRESSURE: 61 MMHG | TEMPERATURE: 97 F | HEART RATE: 82 BPM | BODY MASS INDEX: 23.55 KG/M2

## 2020-12-14 DIAGNOSIS — Z12.11 ENCOUNTER FOR SCREENING COLONOSCOPY: Primary | ICD-10-CM

## 2020-12-14 DIAGNOSIS — D61.818 OTHER PANCYTOPENIA (HCC): ICD-10-CM

## 2020-12-14 DIAGNOSIS — I82.4Z3 DVT, LOWER EXTREMITY, DISTAL, ACUTE, BILATERAL (HCC): ICD-10-CM

## 2020-12-14 DIAGNOSIS — M34.9 SCLERODERMA (HCC): ICD-10-CM

## 2020-12-14 PROCEDURE — 99243 OFF/OP CNSLTJ NEW/EST LOW 30: CPT | Performed by: COLON & RECTAL SURGERY

## 2020-12-14 PROCEDURE — 3008F BODY MASS INDEX DOCD: CPT | Performed by: COLON & RECTAL SURGERY

## 2020-12-14 PROCEDURE — 3078F DIAST BP <80 MM HG: CPT | Performed by: COLON & RECTAL SURGERY

## 2020-12-14 PROCEDURE — 3074F SYST BP LT 130 MM HG: CPT | Performed by: COLON & RECTAL SURGERY

## 2020-12-14 RX ORDER — SODIUM, POTASSIUM,MAG SULFATES 17.5-3.13G
SOLUTION, RECONSTITUTED, ORAL ORAL
Qty: 1 KIT | Refills: 0 | Status: SHIPPED | OUTPATIENT
Start: 2020-12-14 | End: 2021-04-22

## 2020-12-14 NOTE — H&P
New Patient Visit Note       Active Problems      1. Encounter for screening colonoscopy    2.  DVT, lower extremity, distal, acute, bilateral ([facility] [address]    3. Scleroderma ([facility] [address]    4. Other pancytopenia [facility])        Chief Complaint   Patient presents with:  [place] OOPHORECTOMY      AGE 35       Family History   Problem Relation Age of Onset   • Cancer Father 64        lung, +smokers   • Other (Other) Mother    • Other (osteoarthritis) Mother    • Other (RA) Maternal Grandmother    • Other (psoriasis) Paternal Scarlett Moulding Negative for shortness of breath and wheezing. Cardiovascular: Negative for chest pain and palpitations.    Gastrointestinal: Negative for abdominal distention, abdominal pain, anal bleeding, blood in stool, constipation, diarrhea, nausea, rectal pain an and has not had a colonoscopy. The patient's family history is negative. The patient does not have gastrointestinal symptoms. The patient is at average risk for colorectal cancer.      Patient is in need of colon cancer screening, however given her medical

## 2020-12-17 ENCOUNTER — TELEPHONE (OUTPATIENT)
Dept: FAMILY MEDICINE CLINIC | Facility: CLINIC | Age: 50
End: 2020-12-17

## 2020-12-17 NOTE — TELEPHONE ENCOUNTER
Patient was seen by Dr. Rogelio Olmedo who was going to contact Dr. Tian Trujillo regarding Pt's colonoscopy. Pt called to inquire if Dr. Tian Trujillo spoke with Dr. Rogelio Olmedo and what was the outcome of the conversation.  Please advise

## 2020-12-22 ENCOUNTER — TELEPHONE (OUTPATIENT)
Dept: FAMILY MEDICINE CLINIC | Facility: CLINIC | Age: 50
End: 2020-12-22

## 2020-12-22 DIAGNOSIS — Z12.11 SCREEN FOR COLON CANCER: Primary | ICD-10-CM

## 2020-12-22 NOTE — TELEPHONE ENCOUNTER
Patient called in regards to her colonoscopy visit Dr. Lula Britton. She stated she can do a at home test instead but does not want to do cologuard. She stated she would like us to mail out the fit test to her. She confirmed her address is correct on file.  Please

## 2020-12-22 NOTE — TELEPHONE ENCOUNTER
Pt requesting FIT test for her colon cancer screening. She did see Lonza Joe and he recommended to start with this. OK for FIT testing? Plan   The patient is 48year old and has not had a colonoscopy. The patient's family history is negative.  The patient

## 2020-12-29 ENCOUNTER — HOSPITAL ENCOUNTER (OUTPATIENT)
Dept: MAMMOGRAPHY | Age: 50
Discharge: HOME | End: 2020-12-29
Attending: FAMILY MEDICINE
Payer: MEDICAID

## 2020-12-29 DIAGNOSIS — Z12.31 ENCOUNTER FOR SCREENING MAMMOGRAM FOR MALIGNANT NEOPLASM OF BREAST: ICD-10-CM

## 2020-12-29 PROCEDURE — 77067 SCR MAMMO BI INCL CAD: CPT | Performed by: FAMILY MEDICINE

## 2020-12-29 PROCEDURE — 77063 BREAST TOMOSYNTHESIS BI: CPT | Performed by: FAMILY MEDICINE

## 2020-12-31 ENCOUNTER — LAB ENCOUNTER (OUTPATIENT)
Dept: LAB | Age: 50
End: 2020-12-31
Attending: FAMILY MEDICINE
Payer: MEDICAID

## 2020-12-31 DIAGNOSIS — Z12.11 SCREEN FOR COLON CANCER: ICD-10-CM

## 2021-01-07 LAB — HEMOCCULT STL QL: NEGATIVE

## 2021-01-07 PROCEDURE — 82274 ASSAY TEST FOR BLOOD FECAL: CPT

## 2021-01-15 ENCOUNTER — HOSPITAL ENCOUNTER (OUTPATIENT)
Dept: MRI IMAGING | Facility: HOSPITAL | Age: 51
Discharge: HOME | End: 2021-01-15
Attending: Other
Payer: MEDICAID

## 2021-01-15 ENCOUNTER — TELEPHONE (OUTPATIENT)
Dept: NEUROLOGY | Facility: CLINIC | Age: 51
End: 2021-01-15

## 2021-01-15 DIAGNOSIS — R26.81 UNSTEADINESS ON FEET: ICD-10-CM

## 2021-01-15 DIAGNOSIS — R29.898 WEAKNESS OF BOTH LOWER EXTREMITIES: ICD-10-CM

## 2021-01-15 DIAGNOSIS — R29.898 WEAKNESS OF BOTH LOWER EXTREMITIES: Primary | ICD-10-CM

## 2021-01-15 PROCEDURE — 72141 MRI NECK SPINE W/O DYE: CPT | Performed by: OTHER

## 2021-01-15 PROCEDURE — 72146 MRI CHEST SPINE W/O DYE: CPT | Performed by: OTHER

## 2021-01-20 ENCOUNTER — TELEPHONE (OUTPATIENT)
Dept: HEMATOLOGY/ONCOLOGY | Facility: HOSPITAL | Age: 51
End: 2021-01-20

## 2021-01-20 NOTE — TELEPHONE ENCOUNTER
Monica Speak called saying she was supposed to have labs done in December, but her car service through her insurance never showed up to take her. She says she rescheduled to January, and they didn't come again.  She says she is having a hard time getting to the [place]

## 2021-01-29 ENCOUNTER — LAB ENCOUNTER (OUTPATIENT)
Dept: LAB | Facility: HOSPITAL | Age: 51
End: 2021-01-29
Attending: INTERNAL MEDICINE
Payer: MEDICAID

## 2021-01-29 ENCOUNTER — OFFICE VISIT (OUTPATIENT)
Dept: RHEUMATOLOGY | Facility: CLINIC | Age: 51
End: 2021-01-29
Payer: MEDICAID

## 2021-01-29 VITALS
SYSTOLIC BLOOD PRESSURE: 118 MMHG | RESPIRATION RATE: 16 BRPM | BODY MASS INDEX: 23.55 KG/M2 | HEIGHT: 69 IN | WEIGHT: 159 LBS | HEART RATE: 80 BPM | DIASTOLIC BLOOD PRESSURE: 75 MMHG

## 2021-01-29 DIAGNOSIS — R20.2 NUMBNESS AND TINGLING OF BOTH LOWER EXTREMITIES: ICD-10-CM

## 2021-01-29 DIAGNOSIS — Z51.81 THERAPEUTIC DRUG MONITORING: ICD-10-CM

## 2021-01-29 DIAGNOSIS — R35.0 INCREASED FREQUENCY OF URINATION: ICD-10-CM

## 2021-01-29 DIAGNOSIS — E61.0 COPPER DEFICIENCY: ICD-10-CM

## 2021-01-29 DIAGNOSIS — D53.9 MACROCYTIC ANEMIA: ICD-10-CM

## 2021-01-29 DIAGNOSIS — D72.819 LEUKOPENIA, UNSPECIFIED TYPE: ICD-10-CM

## 2021-01-29 DIAGNOSIS — M34.9 SCLERODERMA (HCC): Primary | ICD-10-CM

## 2021-01-29 DIAGNOSIS — I82.4Z3 DVT, LOWER EXTREMITY, DISTAL, ACUTE, BILATERAL (HCC): ICD-10-CM

## 2021-01-29 DIAGNOSIS — R20.0 NUMBNESS AND TINGLING OF BOTH LOWER EXTREMITIES: ICD-10-CM

## 2021-01-29 DIAGNOSIS — Z79.01 CHRONIC ANTICOAGULATION: ICD-10-CM

## 2021-01-29 DIAGNOSIS — D61.818 OTHER PANCYTOPENIA (HCC): ICD-10-CM

## 2021-01-29 DIAGNOSIS — M34.9 SCLERODERMA (HCC): ICD-10-CM

## 2021-01-29 DIAGNOSIS — D69.6 THROMBOCYTOPENIA (HCC): ICD-10-CM

## 2021-01-29 LAB
ALBUMIN SERPL-MCNC: 3.2 G/DL (ref 3.4–5)
ALBUMIN/GLOB SERPL: 0.9 {RATIO} (ref 1–2)
ALP LIVER SERPL-CCNC: 256 U/L
ALT SERPL-CCNC: 35 U/L
ANION GAP SERPL CALC-SCNC: 6 MMOL/L (ref 0–18)
AST SERPL-CCNC: 37 U/L (ref 15–37)
BASOPHILS # BLD AUTO: 0.04 X10(3) UL (ref 0–0.2)
BASOPHILS NFR BLD AUTO: 1.6 %
BILIRUB SERPL-MCNC: 1.5 MG/DL (ref 0.1–2)
BILIRUB UR QL: NEGATIVE
BUN BLD-MCNC: 13 MG/DL (ref 7–18)
BUN/CREAT SERPL: 21.3 (ref 10–20)
CALCIUM BLD-MCNC: 9.4 MG/DL (ref 8.5–10.1)
CHLORIDE SERPL-SCNC: 110 MMOL/L (ref 98–112)
CLARITY UR: CLEAR
CO2 SERPL-SCNC: 25 MMOL/L (ref 21–32)
COLOR UR: YELLOW
CREAT BLD-MCNC: 0.61 MG/DL
CREAT UR-SCNC: 255 MG/DL
CRP SERPL-MCNC: <0.29 MG/DL (ref ?–0.3)
DEPRECATED RDW RBC AUTO: 53.6 FL (ref 35.1–46.3)
EOSINOPHIL # BLD AUTO: 0.04 X10(3) UL (ref 0–0.7)
EOSINOPHIL NFR BLD AUTO: 1.6 %
ERYTHROCYTE [DISTWIDTH] IN BLOOD BY AUTOMATED COUNT: 15.6 % (ref 11–15)
ERYTHROCYTE [SEDIMENTATION RATE] IN BLOOD: 10 MM/HR
GLOBULIN PLAS-MCNC: 3.5 G/DL (ref 2.8–4.4)
GLUCOSE BLD-MCNC: 85 MG/DL (ref 70–99)
GLUCOSE UR-MCNC: NEGATIVE MG/DL
HCT VFR BLD AUTO: 36 %
HGB BLD-MCNC: 11.5 G/DL
HGB UR QL STRIP.AUTO: NEGATIVE
IMM GRANULOCYTES # BLD AUTO: 0 X10(3) UL (ref 0–1)
IMM GRANULOCYTES NFR BLD: 0 %
LYMPHOCYTES # BLD AUTO: 1.1 X10(3) UL (ref 1–4)
LYMPHOCYTES NFR BLD AUTO: 43.1 %
M PROTEIN MFR SERPL ELPH: 6.7 G/DL (ref 6.4–8.2)
MCH RBC QN AUTO: 30.3 PG (ref 26–34)
MCHC RBC AUTO-ENTMCNC: 31.9 G/DL (ref 31–37)
MCV RBC AUTO: 94.7 FL
MICROALBUMIN UR-MCNC: 1.49 MG/DL
MICROALBUMIN/CREAT 24H UR-RTO: 5.8 UG/MG (ref ?–30)
MONOCYTES # BLD AUTO: 0.27 X10(3) UL (ref 0.1–1)
MONOCYTES NFR BLD AUTO: 10.6 %
NEUTROPHILS # BLD AUTO: 1.1 X10 (3) UL (ref 1.5–7.7)
NEUTROPHILS # BLD AUTO: 1.1 X10(3) UL (ref 1.5–7.7)
NEUTROPHILS NFR BLD AUTO: 43.1 %
NITRITE UR QL STRIP.AUTO: NEGATIVE
OSMOLALITY SERPL CALC.SUM OF ELEC: 291 MOSM/KG (ref 275–295)
PATIENT FASTING Y/N/NP: YES
PH UR: 5 [PH] (ref 5–8)
PLATELET # BLD AUTO: 126 10(3)UL (ref 150–450)
POTASSIUM SERPL-SCNC: 3.6 MMOL/L (ref 3.5–5.1)
PROT UR-MCNC: 30 MG/DL
RBC # BLD AUTO: 3.8 X10(6)UL
RBC #/AREA URNS AUTO: 2 /HPF
SODIUM SERPL-SCNC: 141 MMOL/L (ref 136–145)
SP GR UR STRIP: 1.03 (ref 1–1.03)
UROBILINOGEN UR STRIP-ACNC: 4
WBC # BLD AUTO: 2.6 X10(3) UL (ref 4–11)
WBC #/AREA URNS AUTO: 3 /HPF

## 2021-01-29 PROCEDURE — 82570 ASSAY OF URINE CREATININE: CPT

## 2021-01-29 PROCEDURE — 3078F DIAST BP <80 MM HG: CPT | Performed by: INTERNAL MEDICINE

## 2021-01-29 PROCEDURE — 82043 UR ALBUMIN QUANTITATIVE: CPT

## 2021-01-29 PROCEDURE — 36415 COLL VENOUS BLD VENIPUNCTURE: CPT

## 2021-01-29 PROCEDURE — 80053 COMPREHEN METABOLIC PANEL: CPT

## 2021-01-29 PROCEDURE — 85025 COMPLETE CBC W/AUTO DIFF WBC: CPT

## 2021-01-29 PROCEDURE — 82306 VITAMIN D 25 HYDROXY: CPT

## 2021-01-29 PROCEDURE — 99214 OFFICE O/P EST MOD 30 MIN: CPT | Performed by: INTERNAL MEDICINE

## 2021-01-29 PROCEDURE — 82525 ASSAY OF COPPER: CPT

## 2021-01-29 PROCEDURE — 85652 RBC SED RATE AUTOMATED: CPT

## 2021-01-29 PROCEDURE — 81001 URINALYSIS AUTO W/SCOPE: CPT

## 2021-01-29 PROCEDURE — 86140 C-REACTIVE PROTEIN: CPT

## 2021-01-29 PROCEDURE — 3074F SYST BP LT 130 MM HG: CPT | Performed by: INTERNAL MEDICINE

## 2021-01-29 PROCEDURE — 3008F BODY MASS INDEX DOCD: CPT | Performed by: INTERNAL MEDICINE

## 2021-01-29 RX ORDER — PREDNISONE 2.5 MG
2.5 TABLET ORAL 2 TIMES DAILY
Qty: 60 TABLET | Refills: 3 | Status: SHIPPED | OUTPATIENT
Start: 2021-01-29 | End: 2021-02-15

## 2021-01-29 RX ORDER — MYCOPHENOLIC ACID 360 MG/1
360 TABLET, DELAYED RELEASE ORAL
Qty: 60 TABLET | Refills: 2 | Status: SHIPPED | OUTPATIENT
Start: 2021-01-29 | End: 2021-02-04

## 2021-01-29 NOTE — PROGRESS NOTES
Brit Blank is a 48year old female who presents for Patient presents with:  Medication Follow-Up: Scleroderma F/U  Hand Pain: B/L  Elbow Pain: B/L, Left more with swelling   Hip Pain: B/L  Finger Pain: B/L stiffness  Leg Pain: B/L NO feelings from She feelsit's ok. She's on cough medication. She gets periods of fatigue. She's had this in the past before. She has passed out before - was taken to hospital about 2 years ago - high liver enzymes. She [place] [facility] [place] [facility] the methotrexate and prednisone a little bit about at that time one month ago - she is on normal doses now. The rayanud's is a little bit there in her feet. She's doing ok. The tips of her fingers are getting more sore.  She has small calcifications at ti painful again with the winter. It's not openid up but there is a lesion there. It hurts to touch. Her feet feel swollen . She has more raynad's in her feet than before. Everything hurt.s   The gabapentin helped in the beginning .  But it stopped working able to go back to work b/c of ongoing balance and equilibruim issues. She feels she has Sjogrens' sympotms. seamanda has dry mouth - she's had this for years. She gets sore starting on her tongue.    She has had SSA since 2014 and 2018 - repeatedly - c/w not that well. sunday smith rosey pain. She can't feel below the knee. She got sores on her leg that were oozing. This started on 5/27. She had taken a bath and she noticed that there is water that is oozing and not from her bath.    Her legs are swollen x 3 taking prednisone 10mg mid jan. She only takes mycophenolate 1/2 500mg - it makes her sick. Nauseated. Wt Readings from Last 2 Encounters:  01/29/21 : 159 lb (72.1 kg)  12/14/20 : 159 lb (72.1 kg)    Body mass index is 23.48 kg/m².       Current Out Past Surgical History:   Procedure Laterality Date   • HYSTERECTOMY      AGE 35   • ARIANA BIOPSY STEREO NODULE 1 SITE RIGHT (CPT=19081)  4/2015     Benign   • OOPHORECTOMY      AGE 35      Family History   Problem Relation Age of Onset   • Cancer Father 64 sclear, PERRLA, pleasant, no acute distress, no CAD, no neck tendnerness, good ROM,   No rashes  One  oral ulcers on tongue right now   CVS: RRR, no murmurs  RS: CTAB, no crackles, no rhonchi  ABD: Soft Non tender, no HSM felt, BS positive  Joint exam:   S (H) 107.8 (H)    MCH      26.0 - 34.0 pg 37.2 (H) 35.7 (H)    MCHC      31.0 - 37.0 g/dL 33.0 33.1    RDW      11.0 - 15.0 % 14.8 13.4    RDW-SD      35.1 - 46.3 fL 61.3 (H) 52.7 (H)    Prelim Neutrophil Abs      1.50 - 7.70 x10 (3) uL 1.22 (L) 2.26    Kailyn Negative mg/dL   Negative   Bilirubin      Negative   Negative   Ketones, UA      Negative mg/dL   Negative   Blood Urine      Negative   Negative   PH Urine      5.0 - 8.0   6.0   Protein Urine      Negative mg/dL   Negative   Urobilinogen Urine      <2.0 normal TLC ,   suggestive of air trapping     The diffusing capacity is normal    Compared to the previous PFT dated 8.6.14  there  has been no    change in FEV1, but  TLC increased from 5.52L to 5.62 L with   corresponding increase RV from 2.29L to 2.41L est): 12mm Hg. 4. Pulmonary arteries: Systolic pressure was within the normal     range. No previous study was available for comparison.     1/15/2021 MRi thoracic spine  CONCLUSION:  Minimal disc bulges without significant spinal canal or neural foramina to  f/u with neurology regarding equilibrium issues and neuropathy - she did not complete work up for this and difficulty to say why she has neuropathy - insurance is denying several testst  She has had sjogrne's testing positive in 2014 and 2018 - this is help differentiated reasons for her ongoing numnbess in legs /neuropathy possibly versus radiculopathy - will try to get mri lumbar spine approved       Summary:  1 decarease  Methotrexate to  5 tablets a week and  Cont.  folic acid 3 mg a day b/c of low w

## 2021-01-29 NOTE — PATIENT INSTRUCTIONS
1 decarease  Methotrexate to  5 tablets a week and  Cont.  folic acid 3 mg a day b/c of low white count   2. Cont. Mycophenolate - swtch to 360mg twice a day   3. Cont. ibuporfen 400mg twice a day -   4 . Cont.  losasrtan  50mg a day for raynaud's   5.   C

## 2021-01-31 LAB — COPPER, SERUM: 94 UG/DL

## 2021-02-01 LAB — 25(OH)D3 SERPL-MCNC: 50.6 NG/ML (ref 30–100)

## 2021-02-02 ENCOUNTER — PATIENT MESSAGE (OUTPATIENT)
Dept: FAMILY MEDICINE CLINIC | Facility: CLINIC | Age: 51
End: 2021-02-02

## 2021-02-02 DIAGNOSIS — E87.6 HYPOKALEMIA: ICD-10-CM

## 2021-02-02 DIAGNOSIS — I89.0 LYMPHEDEMA: ICD-10-CM

## 2021-02-02 NOTE — TELEPHONE ENCOUNTER
Pt requesting for medication refill of Potassium 10meq BID and Torsemide 5mg BID. Please approve or deny. Thank you.    NTO:16/[id number]  TR:2/59/5793

## 2021-02-02 NOTE — TELEPHONE ENCOUNTER
From: Brit Blank  To: Denisse Wright MD  Sent: 2/2/2021 11:27 AM CST  Subject: Other    Hi DR Talib Hayden. I need a refill of Potassium CL 10 MEQ ER TABLETS . Take 2 tablets by mouth twice daily.  I also need a refill of TORSEMIDE 5 MG TABLETS TAKE 1 by mouth

## 2021-02-03 ENCOUNTER — PATIENT MESSAGE (OUTPATIENT)
Dept: RHEUMATOLOGY | Facility: CLINIC | Age: 51
End: 2021-02-03

## 2021-02-03 NOTE — TELEPHONE ENCOUNTER
From: Jonnathan Ortega  To: Maria Guadalupe Shields MD  Sent: 2/3/2021 12:48 PM CST  Subject: Other    Hi DR COLEMAN . I tried taking the MYCOPHENOLIC ACID DELAYED RELEASE TABLETS for 4 days and they made me throw up and have diarrhea.  I have ACID RELUX a

## 2021-02-04 RX ORDER — POTASSIUM CHLORIDE 750 MG/1
20 TABLET, FILM COATED, EXTENDED RELEASE ORAL 2 TIMES DAILY
Qty: 60 TABLET | Refills: 0 | Status: SHIPPED | OUTPATIENT
Start: 2021-02-04 | End: 2021-03-03

## 2021-02-04 RX ORDER — MYCOPHENOLATE MOFETIL 250 MG/1
250 CAPSULE ORAL
Qty: 60 CAPSULE | Refills: 1 | Status: SHIPPED | OUTPATIENT
Start: 2021-02-04

## 2021-02-04 RX ORDER — TORSEMIDE 5 MG/1
5 TABLET ORAL 2 TIMES DAILY
Qty: 60 TABLET | Refills: 0 | Status: SHIPPED | OUTPATIENT
Start: 2021-02-04 | End: 2021-03-08

## 2021-02-15 ENCOUNTER — PATIENT MESSAGE (OUTPATIENT)
Dept: RHEUMATOLOGY | Facility: CLINIC | Age: 51
End: 2021-02-15

## 2021-02-15 RX ORDER — PREDNISONE 2.5 MG
2.5 TABLET ORAL 2 TIMES DAILY
Qty: 60 TABLET | Refills: 3 | Status: SHIPPED | OUTPATIENT
Start: 2021-02-15 | End: 2021-06-18

## 2021-02-15 NOTE — TELEPHONE ENCOUNTER
From: Will Walsh  To: Inderjit Connolly MD  Sent: 2/15/2021 12:59 PM CST  Subject: Other    Hi DR COLEMAN. I need a refill of PREDNISONE 2.5 MG TABLETS take one tablet by mouth twice daily. Thank You.

## 2021-02-15 NOTE — TELEPHONE ENCOUNTER
[facility] filled: 1/29/21 #60 tab with 3 refills   LOV: 1/29/21  Future Appointments   Date Time Provider Kim Verma   3/22/2021 11:30 AM Lea Sullivan MD [facility] 10:   Summary:  1 decarease  Methotrexate to  5 tablets a week and

## 2021-03-03 ENCOUNTER — PATIENT MESSAGE (OUTPATIENT)
Dept: FAMILY MEDICINE CLINIC | Facility: CLINIC | Age: 51
End: 2021-03-03

## 2021-03-03 DIAGNOSIS — E87.6 HYPOKALEMIA: ICD-10-CM

## 2021-03-03 RX ORDER — POTASSIUM CHLORIDE 750 MG/1
20 TABLET, FILM COATED, EXTENDED RELEASE ORAL 2 TIMES DAILY
Qty: 120 TABLET | Refills: 0 | Status: SHIPPED | OUTPATIENT
Start: 2021-03-03 | End: 2021-05-04

## 2021-03-03 NOTE — TELEPHONE ENCOUNTER
From: Michael Titus  To: Ciara Brady MD  Sent: 3/3/2021 11:26 AM CST  Subject: Other    Hi DR RAZA. I need a refill of POTASSIUM CL 10 MEQ ER TABLLETS. Take two tablets by mouth twice daily. Thank You.

## 2021-03-03 NOTE — TELEPHONE ENCOUNTER
Medication(s) to Refill:   Requested Prescriptions     Pending Prescriptions Disp Refills   • Potassium Chloride ER 10 MEQ Oral Tab  tablet 0     Sig: Take 2 tablets (20 mEq total) by mouth 2 (two) times daily.      Pt also taking:   losartan Nena

## 2021-03-08 ENCOUNTER — PATIENT MESSAGE (OUTPATIENT)
Dept: FAMILY MEDICINE CLINIC | Facility: CLINIC | Age: 51
End: 2021-03-08

## 2021-03-08 DIAGNOSIS — E87.6 HYPOKALEMIA: ICD-10-CM

## 2021-03-08 DIAGNOSIS — I89.0 LYMPHEDEMA: ICD-10-CM

## 2021-03-08 RX ORDER — TORSEMIDE 5 MG/1
5 TABLET ORAL 2 TIMES DAILY
Qty: 60 TABLET | Refills: 0 | Status: SHIPPED | OUTPATIENT
Start: 2021-03-08 | End: 2021-04-08

## 2021-03-08 NOTE — TELEPHONE ENCOUNTER
Patient requesting a refill on torsemide. LF 2/4/2021. LOV 10/19/2020. Please approve or deny pending rx. Thank you.

## 2021-03-08 NOTE — TELEPHONE ENCOUNTER
From: Jonnathan Ortega  To: Nita Dodge MD  Sent: 3/8/2021 11:08 AM CST  Subject: Other    Hi DR Sharan Andrade. I need a refill of TORSEMIDE 5MG TABLETS take one tablet by mouth twice daily. Thank You.

## 2021-03-22 ENCOUNTER — APPOINTMENT (OUTPATIENT)
Dept: HEMATOLOGY/ONCOLOGY | Facility: HOSPITAL | Age: 51
End: 2021-03-22
Attending: INTERNAL MEDICINE
Payer: MEDICAID

## 2021-04-08 ENCOUNTER — PATIENT MESSAGE (OUTPATIENT)
Dept: FAMILY MEDICINE CLINIC | Facility: CLINIC | Age: 51
End: 2021-04-08

## 2021-04-08 DIAGNOSIS — E87.6 HYPOKALEMIA: ICD-10-CM

## 2021-04-08 DIAGNOSIS — I89.0 LYMPHEDEMA: ICD-10-CM

## 2021-04-08 RX ORDER — TORSEMIDE 5 MG/1
5 TABLET ORAL 2 TIMES DAILY
Qty: 60 TABLET | Refills: 0 | Status: SHIPPED | OUTPATIENT
Start: 2021-04-08 | End: 2021-05-12

## 2021-04-08 NOTE — TELEPHONE ENCOUNTER
From: Bc Suggs  To: Madison Jewell MD  Sent: 4/8/2021 12:17 PM CDT  Subject: Other    Hi Dr Dexter Beal. I need a refill of TORSEMIDE 5 MG TABLETS Take 1 tablet by mouth twice daily.  Thank You

## 2021-04-22 ENCOUNTER — HOSPITAL ENCOUNTER (OUTPATIENT)
Dept: ULTRASOUND IMAGING | Facility: HOSPITAL | Age: 51
Discharge: HOME | End: 2021-04-22
Attending: INTERNAL MEDICINE
Payer: MEDICAID

## 2021-04-22 ENCOUNTER — OFFICE VISIT (OUTPATIENT)
Dept: HEMATOLOGY/ONCOLOGY | Facility: HOSPITAL | Age: 51
End: 2021-04-22
Attending: INTERNAL MEDICINE
Payer: MEDICAID

## 2021-04-22 VITALS
TEMPERATURE: 98 F | SYSTOLIC BLOOD PRESSURE: 120 MMHG | RESPIRATION RATE: 18 BRPM | HEART RATE: 83 BPM | DIASTOLIC BLOOD PRESSURE: 73 MMHG | OXYGEN SATURATION: 97 %

## 2021-04-22 DIAGNOSIS — D61.818 ACQUIRED PANCYTOPENIA (HCC): Primary | ICD-10-CM

## 2021-04-22 DIAGNOSIS — D53.9 MACROCYTIC ANEMIA: ICD-10-CM

## 2021-04-22 DIAGNOSIS — Z79.01 CHRONIC ANTICOAGULATION: ICD-10-CM

## 2021-04-22 DIAGNOSIS — I82.4Z3 DVT, LOWER EXTREMITY, DISTAL, ACUTE, BILATERAL (HCC): ICD-10-CM

## 2021-04-22 DIAGNOSIS — D69.6 THROMBOCYTOPENIA (HCC): ICD-10-CM

## 2021-04-22 DIAGNOSIS — E61.0 COPPER DEFICIENCY: ICD-10-CM

## 2021-04-22 DIAGNOSIS — D61.818 OTHER PANCYTOPENIA (HCC): ICD-10-CM

## 2021-04-22 DIAGNOSIS — M34.9 SCLERODERMA (HCC): ICD-10-CM

## 2021-04-22 DIAGNOSIS — R60.0 BILATERAL LEG EDEMA: ICD-10-CM

## 2021-04-22 PROCEDURE — 99215 OFFICE O/P EST HI 40 MIN: CPT | Performed by: INTERNAL MEDICINE

## 2021-04-22 PROCEDURE — 93970 EXTREMITY STUDY: CPT | Performed by: INTERNAL MEDICINE

## 2021-04-22 NOTE — PROGRESS NOTES
Education Record    Learner:  Patient    Disease / Diagnosis:DVT    Barriers / Limitations:  None   Comments:    Method:  Discussion   Comments:    General Topics:  Plan of care reviewed   Comments:    Outcome:  Shows understanding   Comments: 6 month f/up

## 2021-04-22 NOTE — PROGRESS NOTES
Hematology Clinic Follow Up Visit    Patient Name: Bc Suggs  Medical Record Number: [id number]    YOB: 1970   PCP: Dr. Madison Jewell  Other providers: Dr. Edith Gutierrez (rheum), Dr. Steven Baptiste (neurology)    Reason for Laterality Date   • HYSTERECTOMY      AGE 35   • ARIANA BIOPSY STEREO NODULE 1 SITE RIGHT (CPT=19081)  4/2015     Benign   • OOPHORECTOMY      AGE 33     Home Medications:  torsemide 5 MG Oral Tab, Take 1 tablet (5 mg total) by mouth 2 (two) times daily. , Dis status: Never Smoker      Smokeless tobacco: Never Used    Vaping Use      Vaping Use: Never used    Alcohol use: No    Drug use: No    Family Medical History:  Family History   Problem Relation Age of Onset   • Cancer Father 64        lung, +smokers   • O Lab Results   Component Value Date    GLU 85 01/29/2021    BUN 13 01/29/2021    BUNCREA 21.3 (H) 01/29/2021    CREATSERUM 0.61 01/29/2021    CREATSERUM 0.51 (L) 09/11/2020    CREATSERUM 0.61 08/27/2020    ANIONGAP 6 01/29/2021     01/15/2018 Results   Component Value Date    CRP <0.29 01/29/2021    CRP <0.29 09/11/2020    CRP <0.29 06/05/2020    CRP <0.29 01/13/2020    CRP <0.29 10/16/2019    CRP <0.29 07/19/2019    CRP <0.29 03/04/2019    CRP <0.5 08/13/2018    CRP <0.29 04/26/2018    CRP 1.1 omeprazole twice daily for now if needed. *Scleroderma and Sjogren's overlap  -Follows with rheumatology, currently on methotrexate and prednisone. The skin thickening and edema is related to this underlying diagnosis.     *Pancytopenia-  -Likely relate

## 2021-04-23 PROBLEM — D61.818 ACQUIRED PANCYTOPENIA (HCC): Status: ACTIVE | Noted: 2021-04-23

## 2021-04-23 PROBLEM — E61.0 COPPER DEFICIENCY: Status: ACTIVE | Noted: 2021-04-23

## 2021-04-23 PROBLEM — R60.0 BILATERAL LEG EDEMA: Status: ACTIVE | Noted: 2021-04-23

## 2021-05-04 ENCOUNTER — PATIENT MESSAGE (OUTPATIENT)
Dept: FAMILY MEDICINE CLINIC | Facility: CLINIC | Age: 51
End: 2021-05-04

## 2021-05-04 DIAGNOSIS — E87.6 HYPOKALEMIA: ICD-10-CM

## 2021-05-04 RX ORDER — POTASSIUM CHLORIDE 750 MG/1
20 TABLET, FILM COATED, EXTENDED RELEASE ORAL 2 TIMES DAILY
Qty: 120 TABLET | Refills: 0 | Status: SHIPPED | OUTPATIENT
Start: 2021-05-04

## 2021-05-04 NOTE — TELEPHONE ENCOUNTER
From: Hannah Lozano  To: Tika Santana MD  Sent: 5/4/2021 10:17 AM CDT  Subject: Other    Hi DR Erika Akers . I need a refill of POTASSIUM CL MICRO 10 MEQ ER TABS TAKE 2 TABLETS BY MOUTH TWICE DAILY. Thank You.

## 2021-05-05 DIAGNOSIS — M34.9 SCLERODERMA (HCC): ICD-10-CM

## 2021-05-05 RX ORDER — FOLIC ACID 1 MG/1
3 TABLET ORAL DAILY
Qty: 90 TABLET | Refills: 5 | Status: SHIPPED | OUTPATIENT
Start: 2021-05-05

## 2021-05-05 NOTE — TELEPHONE ENCOUNTER
Faxed refill request received from [facility] in [place]. Requesting refill for folic acid.      LOV 1/29/21  No F/U scheduled  Last fill 4/17/20

## 2021-05-12 ENCOUNTER — PATIENT MESSAGE (OUTPATIENT)
Dept: FAMILY MEDICINE CLINIC | Facility: CLINIC | Age: 51
End: 2021-05-12

## 2021-05-12 DIAGNOSIS — I89.0 LYMPHEDEMA: ICD-10-CM

## 2021-05-12 DIAGNOSIS — E87.6 HYPOKALEMIA: ICD-10-CM

## 2021-05-12 RX ORDER — TORSEMIDE 5 MG/1
5 TABLET ORAL 2 TIMES DAILY
Qty: 60 TABLET | Refills: 0 | Status: SHIPPED | OUTPATIENT
Start: 2021-05-12 | End: 2021-06-18

## 2021-05-12 NOTE — TELEPHONE ENCOUNTER
From: [facility]  To: Derick Li MD  Sent: 5/12/2021 11:10 AM CDT  Subject: Other    Hi DR Jaylin Hazel . I need a refill of TORSEMIDE 5 MG TABLETS TAKE 1 TABLET BY MOUTH TWICE DAILY. Thank YOU.

## 2021-05-12 NOTE — TELEPHONE ENCOUNTER
Medication(s) to Refill:   Requested Prescriptions     Pending Prescriptions Disp Refills   • torsemide 5 MG Oral Tab 60 tablet 0     Sig: Take 1 tablet (5 mg total) by mouth 2 (two) times daily.          Reason for Medication Refill being sent to Provider

## 2021-05-19 ENCOUNTER — PATIENT MESSAGE (OUTPATIENT)
Dept: RHEUMATOLOGY | Facility: CLINIC | Age: 51
End: 2021-05-19

## 2021-05-19 NOTE — TELEPHONE ENCOUNTER
From: Zoila Hirsch  To: Edwardo Lundy MD  Sent: 5/19/2021 11:05 AM CDT  Subject: Other    Hi DR COLEMAN . I need a refill of LOSARTAN 50 MG TABLETS TAKE ONE TABLET BY MOUTH DAILY. THANK YOU.

## 2021-05-19 NOTE — TELEPHONE ENCOUNTER
Refill request for Losartan  LOV: 1/19  Future Appointments   Date Time Provider Kim Verma   5/25/2021 11:20 AM Santosh Shelton MD [facility]   Rx pended for 6 months supply.

## 2021-05-20 RX ORDER — LOSARTAN POTASSIUM 50 MG/1
50 TABLET ORAL DAILY
Qty: 90 TABLET | Refills: 1 | Status: SHIPPED | OUTPATIENT
Start: 2021-05-20 | End: 2021-12-06

## 2021-06-18 DIAGNOSIS — I89.0 LYMPHEDEMA: ICD-10-CM

## 2021-06-18 DIAGNOSIS — E87.6 HYPOKALEMIA: ICD-10-CM

## 2021-06-18 RX ORDER — TORSEMIDE 5 MG/1
5 TABLET ORAL 2 TIMES DAILY
Qty: 60 TABLET | Refills: 0 | Status: SHIPPED | OUTPATIENT
Start: 2021-06-18

## 2021-06-18 RX ORDER — PREDNISONE 2.5 MG
2.5 TABLET ORAL 2 TIMES DAILY
Qty: 60 TABLET | Refills: 3 | Status: SHIPPED | OUTPATIENT
Start: 2021-06-18

## 2021-06-18 NOTE — TELEPHONE ENCOUNTER
Prednisone 2.5 mg Refill Request  LOV: 1/29/21  No future appointments.      Summary:  1 decarease  Methotrexate to  5 tablets a week and  Cont.  folic acid 3 mg a day b/c of low white count   2. Cont. Mycophenolate - 360mg twice a day   3. Cont.   ibuporfe

## 2021-06-18 NOTE — TELEPHONE ENCOUNTER
Pt calling and requesting for medication refill of Torsemide 5 MG Oral Tablet (DEMADEX). Please approve or deny pending Rx. Thank you.    LOV:10/19/21  LF: 5/12/21

## 2021-06-18 NOTE — TELEPHONE ENCOUNTER
Please ask pt.  To check her labs now and make a follow up in 1 month - we will need to see if she has to take less methtorexate

## 2021-07-26 ENCOUNTER — TELEPHONE (OUTPATIENT)
Dept: RHEUMATOLOGY | Facility: CLINIC | Age: 51
End: 2021-07-26

## 2021-07-26 RX ORDER — PREDNISONE 10 MG/1
TABLET ORAL
Qty: 30 TABLET | Refills: 0 | Status: SHIPPED | OUTPATIENT
Start: 2021-07-26

## 2021-07-26 NOTE — TELEPHONE ENCOUNTER
\"Pt calling following up on her request for prednisone, states sores in her mouth are painful. Requesting medication refill as soon as possible. Please advise. \"    Patient requesting prednisone 10mg due to soreness in her mouth.     Summary:  1 decar

## 2021-12-06 RX ORDER — LOSARTAN POTASSIUM 50 MG/1
TABLET ORAL
Qty: 90 TABLET | Refills: 1 | Status: SHIPPED | OUTPATIENT
Start: 2021-12-06

## 2021-12-06 NOTE — TELEPHONE ENCOUNTER
Requested Prescriptions     Pending Prescriptions Disp Refills   • LOSARTAN 50 MG Oral Tab [Pharmacy Med Name: LOSARTAN 50MG TABLETS] 90 tablet 1     Sig: TAKE 1 TABLET(50 MG) BY MOUTH DAILY     LF: 5/20/21 #90 TAB W/  1 RF  LOV: 1/29/21   No future appoin Total Bilirubin      0.1 - 2.0 mg/dL 1.4    PROTEIN, TOTAL      6.4 - 8.2 g/dL 6.6    Albumin      3.4 - 5.0 g/dL 3.4    Globulin      2.8 - 4.4 g/dL 3.2    A/G Ratio      1.0 - 2.0 1.1    Patient Fasting?        No    RETIC%      0.5 - 2.5 % 1.5    RETIC mOsm/kg 291   eGFR NON-AFR.  AMERICAN      >=60 106   eGFR       >=60 122   ALT (SGPT)      13 - 56 U/L 35   AST (SGOT)      15 - 37 U/L 37   ALKALINE PHOSPHATASE      39 - 100 U/L 256 (H)   Total Bilirubin      0.1 - 2.0 mg/dL 1.5   PROTEIN

## 2022-01-01 NOTE — PATIENT INSTRUCTIONS
1. Get labs today   2. Pulmonary function tests when able and check 2decho   3. Cont. Methotrexate 8 tablets a week and  Increase folic acid 2 mg a day   4. Cont. losasrtan  - increase to 50mg a day for raynaud's   5. Increase Prednisone 5mg a day   6.  Ret No - the patient is unable to be screened due to medical condition

## 2022-02-26 ENCOUNTER — PATIENT MESSAGE (OUTPATIENT)
Dept: RHEUMATOLOGY | Facility: CLINIC | Age: 52
End: 2022-02-26

## 2022-02-28 ENCOUNTER — PATIENT MESSAGE (OUTPATIENT)
Dept: RHEUMATOLOGY | Facility: CLINIC | Age: 52
End: 2022-02-28

## 2022-02-28 RX ORDER — FOLIC ACID 1 MG/1
3 TABLET ORAL DAILY
Qty: 90 TABLET | Refills: 5 | Status: SHIPPED | OUTPATIENT
Start: 2022-02-28

## 2022-02-28 NOTE — TELEPHONE ENCOUNTER
From: Chasidy Moreira  To: Oscar Villalta MD  Sent: 2/28/2022 12:49 PM CST  Subject: Other    Hi DR Rodarte. I need a refill of Folic Acid 1MG Tablets. Chica has already sent a request for refill. Thank You.

## 2022-02-28 NOTE — TELEPHONE ENCOUNTER
From: Cami Dominguez  To: Atif Vyas MD  Sent: 2/26/2022 10:09 AM CST  Subject: Other    Hi DR Rodarte. I need a refill of Folic Acid 1MG Tablets. Thank You.

## 2022-03-21 ENCOUNTER — TELEPHONE (OUTPATIENT)
Dept: FAMILY MEDICINE CLINIC | Facility: CLINIC | Age: 52
End: 2022-03-21

## 2022-03-21 DIAGNOSIS — Z12.11 COLON CANCER SCREENING: Primary | ICD-10-CM

## 2022-04-14 ENCOUNTER — PATIENT MESSAGE (OUTPATIENT)
Dept: RHEUMATOLOGY | Facility: CLINIC | Age: 52
End: 2022-04-14

## 2022-04-14 ENCOUNTER — TELEPHONE (OUTPATIENT)
Dept: RHEUMATOLOGY | Facility: CLINIC | Age: 52
End: 2022-04-14

## 2022-04-14 ENCOUNTER — LAB ENCOUNTER (OUTPATIENT)
Dept: LAB | Age: 52
End: 2022-04-14
Attending: INTERNAL MEDICINE
Payer: MEDICARE

## 2022-04-14 DIAGNOSIS — Z51.81 THERAPEUTIC DRUG MONITORING: ICD-10-CM

## 2022-04-14 DIAGNOSIS — D61.818 ACQUIRED PANCYTOPENIA (HCC): ICD-10-CM

## 2022-04-14 DIAGNOSIS — M34.9 SCLERODERMA (HCC): ICD-10-CM

## 2022-04-14 LAB
ALBUMIN SERPL-MCNC: 4.1 G/DL (ref 3.4–5)
ALBUMIN/GLOB SERPL: 1.3 {RATIO} (ref 1–2)
ALP LIVER SERPL-CCNC: 123 U/L
ALT SERPL-CCNC: 28 U/L
ANION GAP SERPL CALC-SCNC: 6 MMOL/L (ref 0–18)
AST SERPL-CCNC: 27 U/L (ref 15–37)
BILIRUB SERPL-MCNC: 1.6 MG/DL (ref 0.1–2)
BUN BLD-MCNC: 8 MG/DL (ref 7–18)
CALCIUM BLD-MCNC: 10.1 MG/DL (ref 8.5–10.1)
CHLORIDE SERPL-SCNC: 112 MMOL/L (ref 98–112)
CO2 SERPL-SCNC: 25 MMOL/L (ref 21–32)
CREAT BLD-MCNC: 0.71 MG/DL
CRP SERPL-MCNC: <0.29 MG/DL (ref ?–0.3)
ERYTHROCYTE [DISTWIDTH] IN BLOOD BY AUTOMATED COUNT: 14.3 %
ERYTHROCYTE [SEDIMENTATION RATE] IN BLOOD: 8 MM/HR
FASTING STATUS PATIENT QL REPORTED: YES
GLOBULIN PLAS-MCNC: 3.1 G/DL (ref 2.8–4.4)
GLUCOSE BLD-MCNC: 90 MG/DL (ref 70–99)
HCT VFR BLD AUTO: 39.8 %
HGB BLD-MCNC: 12.3 G/DL
MCH RBC QN AUTO: 27.6 PG (ref 26–34)
MCHC RBC AUTO-ENTMCNC: 30.9 G/DL (ref 31–37)
MCV RBC AUTO: 89.2 FL
OSMOLALITY SERPL CALC.SUM OF ELEC: 294 MOSM/KG (ref 275–295)
PLATELET # BLD AUTO: 127 10(3)UL (ref 150–450)
POTASSIUM SERPL-SCNC: 3.9 MMOL/L (ref 3.5–5.1)
PROT SERPL-MCNC: 7.2 G/DL (ref 6.4–8.2)
RBC # BLD AUTO: 4.46 X10(6)UL
SODIUM SERPL-SCNC: 143 MMOL/L (ref 136–145)
WBC # BLD AUTO: 4.8 X10(3) UL (ref 4–11)

## 2022-04-14 PROCEDURE — 80204 DRUG ASSAY METHOTREXATE: CPT

## 2022-04-14 PROCEDURE — 85027 COMPLETE CBC AUTOMATED: CPT

## 2022-04-14 PROCEDURE — 80053 COMPREHEN METABOLIC PANEL: CPT

## 2022-04-14 PROCEDURE — 36415 COLL VENOUS BLD VENIPUNCTURE: CPT

## 2022-04-14 PROCEDURE — 85652 RBC SED RATE AUTOMATED: CPT

## 2022-04-14 PROCEDURE — 86140 C-REACTIVE PROTEIN: CPT

## 2022-04-14 NOTE — TELEPHONE ENCOUNTER
From: Clement Franklin  To: Karyle Frohlich, MD  Sent: 4/14/2022 12:05 PM CDT  Subject: Methotrexate MTX Serum    Hi DR Farhat Branch. I Did not get any test result for this test .It said to Refer to Attachment . I don't know what that means. Nothing came up. Thank You.

## 2022-04-15 ENCOUNTER — TELEPHONE (OUTPATIENT)
Dept: RHEUMATOLOGY | Facility: CLINIC | Age: 52
End: 2022-04-15

## 2022-04-15 ENCOUNTER — PATIENT MESSAGE (OUTPATIENT)
Dept: RHEUMATOLOGY | Facility: CLINIC | Age: 52
End: 2022-04-15

## 2022-04-15 RX ORDER — PREDNISONE 10 MG/1
10 TABLET ORAL DAILY
Qty: 7 TABLET | Refills: 0 | Status: SHIPPED | OUTPATIENT
Start: 2022-04-15

## 2022-04-15 NOTE — TELEPHONE ENCOUNTER
Will send a prescription for prednisone 10 mg daily for a week but she is going to have to follow-up with Dr. Maribel Manley.   Last seen a year ago

## 2022-04-15 NOTE — TELEPHONE ENCOUNTER
From: Esther Mojica  To: Kennedy Rosario MD  Sent: 4/15/2022 2:43 PM CDT  Subject: Mouth Sores    Hi DR Wagner Booth. I have two very painful sores in the the corner of my mouth making it difficult to talk , eat, and sleep. Could I please have the PREDNSONE 10 MG TABLETS. ..  Thank You

## 2022-12-21 DIAGNOSIS — M34.9 SCLERODERMA (HCC): ICD-10-CM

## 2022-12-21 RX ORDER — FOLIC ACID 1 MG/1
3 TABLET ORAL DAILY
Qty: 90 TABLET | Refills: 11 | Status: SHIPPED | OUTPATIENT
Start: 2022-12-21

## 2023-07-03 NOTE — PROGRESS NOTES
Patient presents for follow-up. She is feeling better after the intra-articular injection into her left hip. She has had numerous other medical issues including scleroderma, Raynaud's, and no pleurisy.   She is getting new dermatologic lesions in the lips
never used/caffeine

## (undated) DIAGNOSIS — M34.9 SCLERODERMA (HCC): ICD-10-CM

## (undated) NOTE — MR AVS SNAPSHOT
[place]  [address]  [phone]               Thank you for choosing us for your health care visit with Joshua Gonzales MD.  We are glad to serve you and happy to provide you with this marinelli Commonly known as:  COZAAR           methotrexate 2.5 MG Tabs   Commonly known as:  RHEUMATREX           predniSONE 2.5 MG Tabs   Commonly known as:  DELTASONE           RaNITidine HCl 150 MG Tabs   Take 1 tablet (150 mg total) by mouth 2 (two) times daily

## (undated) NOTE — LETTER
Date: 2/12/2019    Patient Name: Katlyn Persons          To Whom it may concern: The above patient was seen at the [facility] for treatment of a medical condition. This patient should be excused from attending work 2/12/19-2/17/19.

## (undated) NOTE — LETTER
7/21/2017              [facility]        [address] [facility] [address]         To Whom it may concern:     This is to certify that Stephen Contreras had an appointment on 7/21/2017 at 11:10 AM with Duc Barron MD.

## (undated) NOTE — Clinical Note
Date: 1/18/2017    Patient Name: Jaxson Hawk          To Whom it may concern: The above patient was seen at the [facility] for treatment of a medical condition.     This patient should be excused from attending work from 1/16/17 th

## (undated) NOTE — LETTER
6/17/2019          To Whom It May Concern:    Rainer Thomas is currently under my medical care and may not return to work at this time. Please excuse Zeke Winters for 2 months. If you require additional information please contact our office.         Sin

## (undated) NOTE — LETTER
Date: 11/2/2017    Patient Name: Millie Thornton          To Whom it may concern: The above patient was seen at the [facility] for treatment of a medical condition. This patient should be excused from attending work 11/2/2017.     The

## (undated) NOTE — Clinical Note
Needs echo to rule out pulm hypertension, also ordered uring to rule out proteinuria for her new onset lower leg edema. Kerrie Crow

## (undated) NOTE — LETTER
5/1/2019          To Whom It May Concern:    Oneda Oppenheim is currently under my medical care and may not return to work at this time. Please excuse Tannerradha Kristan for 6 weeks. She may return to work on 6/15/2019.       If you require additional information

## (undated) NOTE — LETTER
July 21, 2017         [facility], [place] [address]      Patient: Katlyn Espinal   YOB: 1970   Date of Visit: 7/21/2017       Dear Dr. David Koroma MD,    I saw your patient, Katlyn Espinal, on 7/21/2017.  Enc She has been having hip pain - in 10/2016 it started. She saw PA at that time from her pcp - had mri left hip - saw ortho on 1/2017 -   Dr. Sam Lima and dr. Cyrus Ortega. She's going to get xray next week of left hip.    She contineus to have bad pain in Christina Courser is a 55year old female who presents for Patient presents with:   Follow - Up: pt here for f/u. pt c/o left ankle swelling  Mouth Cold Sores (respiratory/integumentary): pt states hx of mouth sores x couple months that just recently heale

## (undated) NOTE — LETTER
Patient Name: Kaylee Castillo  YOB: 1970          MRN number:  [id number]  Date:  1/15/2020  Referring Physician:  Kev Blunt         VESTIBULAR EVALUATION:   Referring Physician: Dr. Armaan Lane  Diagnosis: Vertigo     Date of Service: 1/14 Dizziness Handicap Inventory [facility]): 60/100, P=8, E=24, P=28     Current functional limitations include difficulty walking, negotiating stairs, bending forward to  things from the floor, performing sit to stand transfers, rolling in bed, needs as Neuro Screen: Sensation: NT     Coordination Testing:   Finger to Nose: NT   Pronation/supination: NT   Toe tapping: NT     Cervical spine ROM: WFL  Adverse neuro signs with ROM: yes no: no     Occulomotor & Vestibulo-Ocular Exam:  Spontaneous Nystagmus: r Based on clinical rationale and outcome measures, this evaluation involved High Complexity decision making due to 3+ personal factors/comorbidities, 4+ body structures involved/activity limitations, and unstable symptoms including changing pain levels.   PL

## (undated) NOTE — LETTER
Date: 2018    Patient Name: Jessica Gomes  : 12/15/1970    To Whom it may concern: The above patient was seen at the [facility] for treatment of a medical condition.     This patient should be excused from attending work

## (undated) NOTE — ED AVS SNAPSHOT
[facility] in [address] [facility]    Phone:  [phone]    Fax:  Shannan Mckeon   MRN: [id number]    Department:  [facility] in [place]   Date of [facility] EMERGENCY DEPARTMENT AT [place], [place]. \Hospital Has a New Name and Outlook.\"" IF THERE IS ANY CHANGE OR WORSENING OF YOUR CONDITION, CALL YOUR PRIMARY CARE PHYSICIAN AT ONCE OR RETURN IMMEDIATELY TO THE EMERGENCY DEPARTMENT.     If you have been prescribed any medication(s), please fill your prescription right away and begin taking t

## (undated) NOTE — Clinical Note
Date: 2/25/2017    Patient Name: Elysia Sims          To Whom it may concern: The above patient was seen at the [facility] for treatment of a medical condition.     This patient should be excused from attending work  from 2/27/17  t

## (undated) NOTE — Clinical Note
3/27/2017              Summer Bakerel [facility]        [address] [facility] [address]         To Whom it may concern:     This is to certify that Ken Espinoza had an appointment on 3/27/2017 at 10:04 AM with Nelson Wills MD.  Cassi

## (undated) NOTE — MR AVS SNAPSHOT
[place]  [address]  [phone]               Thank you for choosing us for your health care visit with Umu Cabral MD.  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            Visit [facility] online at  [email or web address]

## (undated) NOTE — LETTER
4/1/2019          To Whom It May Concern:    Will Walsh is currently under my medical care and may not return to work at this time. Please excuse Afton for 4 weeks. If you require additional information please contact our office.         Since

## (undated) NOTE — LETTER
12/27/2017          To Whom It May Concern:    Jose Schultz is currently under my medical care. She has a sore throat at this time. She is asking to reduce work by 1 hour based on her current medication condition since 12/25/2017 for the next 1 week.

## (undated) NOTE — MR AVS SNAPSHOT
[address] [address]  [phone]               Thank you for choosing us for your health care visit with Inderjit Connolly MD.  We are glad to serve you and happy to provide you with this marinelli days prior to the procedure or at the time of scheduling/screening. Day of procedure: After you register at Outpatient Registration, you will be escorted directly to the lab/radiology department.    Please arrive on time for your lab appointment (60 minut 2. Pulmonary function tests   3. Cont. Methotrexate 8 tablets a week and folic acid 1mg a day   4. Cont. losasrtan 25mg a day   5. Cont. Prednisone 5mg ad ay   6. Return to clinci in 6 weeks.         Allergies as of Mar 27, 2017     Zithromax [Azithromycin] Visits < Visit Summaries. MyChart questions? Call [phone] for help. Research for [employer] is NOT to be used for urgent needs. For medical emergencies, dial 911.            Visit [facility] online at  [email or web address]

## (undated) NOTE — LETTER
3/11/2019          To Whom It May Concern:    Christina Courser is currently under my medical care and may not return to work at this time. Please excuse Mikayla Guerra for 4 weeks. If you require additional information please contact our office.         [facility]

## (undated) NOTE — Clinical Note
Date: 2/22/2017    Patient Name: Katlyn Persons          To Whom it may concern: This letter has been written at the patient's request.  [facility] is a patient at the [facility].      This patient should be excused from attending work 2/2

## (undated) NOTE — Clinical Note
Date: 5/4/2017    Patient Name: Yanira Newman          To Whom it may concern:    Please excuse above from work from 5/1/17 through 5/9/17.         Sincerely,    Nelson Brooks PA-C

## (undated) NOTE — LETTER
Date: 12/28/2017    Patient Name: Allegra Jama          To Whom it may concern: This letter has been written at the patient's request. The above patient was seen at the [facility] for treatment of a medical condition.     This patient

## (undated) NOTE — LETTER
Patient Name: Hannah Lozano  YOB: 1970          MRN number:  [id number]  Date:  4/30/2019  Referring Physician:  Cynthia Ram MD    Discharge Summary  Initial Functional Outcome Score 10/100  Final Functional Outcome Score 23/100 overall unwillingness to actively move the foot and ankle because of anticipated pain. Oftentimes she will respond expressively prior to movement of the foot and ankle.   Therefore I am suspecting central sensitization regarding the foot and ankle has begu

## (undated) NOTE — LETTER
Date: 9/28/2017    Patient Name: Cherylann Lombard          To Whom it may concern: This letter has been written at the patient's request. The above patient was seen at the [facility] for treatment of a medical condition.     This patient

## (undated) NOTE — LETTER
Date: 7/12/2017    Patient Name: Zoila Hirsch          To Whom it may concern: The above patient was seen at the [facility] for treatment of a medical condition.       This patient should be excused from attending work from 7/10/17 th

## (undated) NOTE — MR AVS SNAPSHOT
[place]  [address]  [phone]               Thank you for choosing us for your health care visit with Sophie Buck MD.  We are glad to serve you and happy to provide you with this marinelli Commonly known as:  DELTASONE           TraMADol HCl 50 MG Tabs   Take 1 tablet (50 mg total) by mouth every 8 (eight) hours as needed for Pain.    Commonly known as:  ULTRAM                   MyChart     Visit [employer]  You can access your MyChart to more a

## (undated) NOTE — MR AVS SNAPSHOT
[address] [facility] [address] [phone]               Thank you for choosing us for your health care visit with Mert Huffman NP.   We are glad to serve you and happy to provide you with this summary of you of procedure: After you register at Outpatient Registration, you will be escorted directly to the lab/radiology department. Please arrive on time for your lab appointment (60 minutes prior to the scheduled procedure time). Dietary Instructions:  There ar have an infection, antibiotics can help clear it up. If you are prescribed antibiotics, take all pills on schedule until they are gone, even if you feel better. Decongestants help relieve swelling.  Use decongestant sprays for short periods only under the d ValACYclovir HCl 1 G Tabs   Commonly known as:  VALTREX                Where to Get Your Medications      These medications were sent to [facility]/ [address], [address] AT [address], [phone], [phone]

## (undated) NOTE — MR AVS SNAPSHOT
[address]  [address]  [facility] [address] [phone]               Thank you for choosing us for your health care visit with Sheela Aguilar PA-C.   We are glad to serve you and happy to provide you with this summar ibuprofen 600 MG Tabs   Take 600 mg by mouth every 6 (six) hours as needed for Pain. Commonly known as:  MOTRIN           Losartan Potassium 25 MG Tabs   Take 1 tablet (25 mg total) by mouth daily.    Commonly known as:  COZAAR           methotrex

## (undated) NOTE — LETTER
Patient Name: Lyndsey Sorenson  YOB: 1970          MRN number:  [id number]  Date:  4/9/2019  Referring Physician:  Baldomero Gleason          LOWER EXTREMITY EVALUATION:    Referring Physician: Dr. Evaristo Simental  Diagnosis: Sprain of anterio right miguelangel supported by the clinical findings of increased sensitivity to palpation, decreased AROM/PROM due to pain, and decreased weight bearing across the right LE.     Jovana Escamilla would benefit from skilled Physical Therapy to address the above impairments to involved High Complexity decision making due to 3+ personal factors/comorbidities. PLAN OF CARE:    Goals (12 visits):    1. Increase FOTO assessment > 11% from INE to DC.   2. Patient will be aware of postural limitations and be able to correct them in

## (undated) NOTE — Clinical Note
Date: 5/9/2017    Patient Name: Hannah Lozano          To Whom it may concern:    Please excuse above from work. Patient was re-evaluated on 5/9/17.  She should be excused until 5/15/17      Sincerely,    Nehal Lima PA-C

## (undated) NOTE — Clinical Note
3/27/2017              [facility]        [address] [facility] [address]         To Whom it may concern:     This is to certify that Rainer Thomas had an appointment on 3/27/2017 at 10:02 AM with Sarbjit Jauregui MD.  Cassi

## (undated) NOTE — LETTER
Date: 12/22/2020    Patient Name: Zaida Clark,     We have included a One Day Fecal Immunochemical Test Collection Kit. Along with the test is instructions for returning the kit and a list of foods to avoid before testing.          Sincere

## (undated) NOTE — ED AVS SNAPSHOT
[address] [facility] in [address] [facility]    Phone:  [phone]    Fax:  Shannan Mckeon   MRN: [id number]    Department:  [address] [facility] [facility] in [facility]   Date of [place] have any questions regarding your home medications, including potential side effects. Medication List      Notice     You have not been prescribed any medications.             Discharge References/Attachments     PLEURISY (ENGLISH)      Disclos IF THERE IS ANY CHANGE OR WORSENING OF YOUR CONDITION, CALL YOUR PRIMARY CARE PHYSICIAN AT ONCE OR RETURN IMMEDIATELY TO THE EMERGENCY DEPARTMENT.     If you have been prescribed any medication(s), please fill your prescription right away and begin taking t Patient [address] to help you get signed up for insurance coverage. Patient [address] is a Federal Navigator program that can help with your Affordable Care Act coverage, as well as all types of Medicaid plans.   To get signed up and covere XR CHEST AP PORTABLE  (CPT=71010) (Final result) Result time:  05/03/17 09:22:51    Final result    Impression:    CONCLUSION:  Exam is within normal limits.         Dictated by: Flaquita Yeh DO on 5/03/2017 at 9:21       Approved by: Flaquita Yeh DO

## (undated) NOTE — ED AVS SNAPSHOT
Lyndsey Tariks   MRN: [id number]    Department:  THE [facility] Emergency Department in [place]   Date of Visit:  2/28/2019           Disclosure     Insurance plans vary and the physician(s) referred by the ER may not be covered by your plan.  Please con tell this physician (or your personal doctor if your instructions are to return to your personal doctor) about any new or lasting problems. The primary care or specialist physician will see patients referred from the [facility] Emergency Department.  Husam Werner

## (undated) NOTE — LETTER
Date: 1/8/2018    Patient Name: Gaurang Sarmiento          To Whom it may concern: This letter has been written at the patient's request. The above patient was seen at the [facility] for treatment of a medical condition.     This patient s

## (undated) NOTE — MR AVS SNAPSHOT
[address]  [address]  [address] [address] [phone]               Thank you for choosing us for your health care visit with Yajaira Saha PA-C.   We are glad to serve you and happy to provide you with this summar Commonly known as:  RHEUMATREX           omeprazole 20 MG Cpdr   Take 20 mg by mouth daily. Commonly known as:  PRILOSEC           predniSONE 2.5 MG Tabs   Take 2 tablets (5 mg total) by mouth daily.    Commonly known as:  DELTASONE           TraMADol HCl

## (undated) NOTE — LETTER
Date: 1/15/2018    Patient Name: Kaylee Castillo          To Whom it may concern: This letter has been written at the patient's request. The above patient was seen at the [facility] for treatment of a medical condition.     This patient

## (undated) NOTE — LETTER
Date: 7/10/2017    Patient Name: Yasmin Michael          To Whom it may concern: This letter has been written at the patient's request. The above patient was seen at the [facility] for treatment of a medical condition.     This patient

## (undated) NOTE — LETTER
10/29/18        Zoila Hirsch  [address]      Dear Julianna Costello,    [address] [facility] records indicate that you have outstanding lab work and or testing that was ordered for you and has not yet been completed:  Orders Placed This Encount

## (undated) NOTE — MR AVS SNAPSHOT
[address] [facility] [address] [phone]               Thank you for choosing us for your health care visit with Leighton Kim MD.  We are glad to serve you and happy to provide you with this summary of your Take 1 tablet (150 mg total) by mouth 2 (two) times daily. Commonly known as:  ZANTAC           TraMADol HCl 50 MG Tabs   Take 1 tablet (50 mg total) by mouth every 8 (eight) hours as needed for Pain.    Commonly known as:  ULTRAM                Where to numbers can create reimbursement difficulties for you.     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

## (undated) NOTE — LETTER
01/09/19            Hannah Lozano  [address]       Dear Irene Reinoso,     We are contacting you from Dr. Dee Dee Padilla office. Your health is important to us.  We have not received test results for additional tests that your prov

## (undated) NOTE — LETTER
05/01/19        Chi Montanez  [address]      Dear Ed Mead,    [address] [facility] records indicate that you have outstanding lab work and or testing that was ordered for you and has not yet been completed:        Vitamin D, 25-Hydrox